# Patient Record
Sex: FEMALE | Race: BLACK OR AFRICAN AMERICAN | NOT HISPANIC OR LATINO | Employment: FULL TIME | URBAN - METROPOLITAN AREA
[De-identification: names, ages, dates, MRNs, and addresses within clinical notes are randomized per-mention and may not be internally consistent; named-entity substitution may affect disease eponyms.]

---

## 2019-08-22 ENCOUNTER — DOCUMENTATION ONLY (OUTPATIENT)
Dept: NEUROLOGY | Facility: CLINIC | Age: 44
End: 2019-08-22

## 2019-08-22 NOTE — PROGRESS NOTES
Spoke with pt; informed her that we received the neurology notes confirming MS diagnosis; advised pt that we will need most recent MRI report to schedule appointment; pt verbalized understanding, stated that she has a MRI scheduled Wednesday and will have results faxed to this office; pt read fax number back to confirm accuracy;

## 2019-09-05 ENCOUNTER — TELEPHONE (OUTPATIENT)
Dept: NEUROLOGY | Facility: CLINIC | Age: 44
End: 2019-09-05

## 2019-09-05 NOTE — TELEPHONE ENCOUNTER
----- Message from Joseph Kaye sent at 9/5/2019 10:10 AM CDT -----  Contact: pt @ [phone]  Pt is calling to confirm if MRI has been received

## 2019-09-05 NOTE — TELEPHONE ENCOUNTER
----- Message from Zulay Weaver sent at 9/5/2019 11:26 AM CDT -----  Contact: Pt.Self   Patient Returning Call from [facility]     Who Left Message for Patient:   Ms.Shanna GOMEZ Moeller, RN       Communication Preference:   [phone]     Additional Information:     Thank You

## 2019-09-16 ENCOUNTER — TELEPHONE (OUTPATIENT)
Dept: NEUROLOGY | Facility: CLINIC | Age: 44
End: 2019-09-16

## 2019-10-16 ENCOUNTER — LAB VISIT (OUTPATIENT)
Dept: LAB | Facility: HOSPITAL | Age: 44
End: 2019-10-16
Attending: PSYCHIATRY & NEUROLOGY
Payer: COMMERCIAL

## 2019-10-16 ENCOUNTER — OFFICE VISIT (OUTPATIENT)
Dept: NEUROLOGY | Facility: CLINIC | Age: 44
End: 2019-10-16
Payer: COMMERCIAL

## 2019-10-16 VITALS
HEIGHT: 64 IN | DIASTOLIC BLOOD PRESSURE: 88 MMHG | BODY MASS INDEX: 38.58 KG/M2 | WEIGHT: 226 LBS | HEART RATE: 100 BPM | SYSTOLIC BLOOD PRESSURE: 127 MMHG

## 2019-10-16 DIAGNOSIS — G35 MS (MULTIPLE SCLEROSIS): ICD-10-CM

## 2019-10-16 DIAGNOSIS — D84.9 IMMUNOSUPPRESSION: ICD-10-CM

## 2019-10-16 DIAGNOSIS — G35 MS (MULTIPLE SCLEROSIS): Primary | ICD-10-CM

## 2019-10-16 LAB
25(OH)D3+25(OH)D2 SERPL-MCNC: 37 NG/ML (ref 30–96)
ALBUMIN SERPL BCP-MCNC: 3.9 G/DL (ref 3.5–5.2)
ALP SERPL-CCNC: 57 U/L (ref 55–135)
ALT SERPL W/O P-5'-P-CCNC: 12 U/L (ref 10–44)
ANION GAP SERPL CALC-SCNC: 11 MMOL/L (ref 8–16)
AST SERPL-CCNC: 15 U/L (ref 10–40)
BASOPHILS # BLD AUTO: 0.06 K/UL (ref 0–0.2)
BASOPHILS NFR BLD: 0.5 % (ref 0–1.9)
BILIRUB SERPL-MCNC: 0.3 MG/DL (ref 0.1–1)
BUN SERPL-MCNC: 8 MG/DL (ref 6–20)
CALCIUM SERPL-MCNC: 9.4 MG/DL (ref 8.7–10.5)
CHLORIDE SERPL-SCNC: 107 MMOL/L (ref 95–110)
CO2 SERPL-SCNC: 24 MMOL/L (ref 23–29)
CREAT SERPL-MCNC: 0.6 MG/DL (ref 0.5–1.4)
DIFFERENTIAL METHOD: ABNORMAL
EOSINOPHIL # BLD AUTO: 0.1 K/UL (ref 0–0.5)
EOSINOPHIL NFR BLD: 0.6 % (ref 0–8)
ERYTHROCYTE [DISTWIDTH] IN BLOOD BY AUTOMATED COUNT: 17.7 % (ref 11.5–14.5)
EST. GFR  (AFRICAN AMERICAN): >60 ML/MIN/1.73 M^2
EST. GFR  (NON AFRICAN AMERICAN): >60 ML/MIN/1.73 M^2
GLUCOSE SERPL-MCNC: 103 MG/DL (ref 70–110)
HBV CORE AB SERPL QL IA: NEGATIVE
HBV SURFACE AB SER-ACNC: POSITIVE M[IU]/ML
HBV SURFACE AG SERPL QL IA: NEGATIVE
HCT VFR BLD AUTO: 36.8 % (ref 37–48.5)
HCV AB SERPL QL IA: NEGATIVE
HEPATITIS A ANTIBODY, IGG: NEGATIVE
HGB BLD-MCNC: 11.8 G/DL (ref 12–16)
HIV 1+2 AB+HIV1 P24 AG SERPL QL IA: NEGATIVE
IMM GRANULOCYTES # BLD AUTO: 0.05 K/UL (ref 0–0.04)
IMM GRANULOCYTES NFR BLD AUTO: 0.4 % (ref 0–0.5)
LYMPHOCYTES # BLD AUTO: 1.9 K/UL (ref 1–4.8)
LYMPHOCYTES NFR BLD: 15.4 % (ref 18–48)
MCH RBC QN AUTO: 23.2 PG (ref 27–31)
MCHC RBC AUTO-ENTMCNC: 32.1 G/DL (ref 32–36)
MCV RBC AUTO: 72 FL (ref 82–98)
MONOCYTES # BLD AUTO: 0.6 K/UL (ref 0.3–1)
MONOCYTES NFR BLD: 4.9 % (ref 4–15)
NEUTROPHILS # BLD AUTO: 9.5 K/UL (ref 1.8–7.7)
NEUTROPHILS NFR BLD: 78.2 % (ref 38–73)
NRBC BLD-RTO: 0 /100 WBC
PLATELET # BLD AUTO: 312 K/UL (ref 150–350)
PMV BLD AUTO: 10.2 FL (ref 9.2–12.9)
POTASSIUM SERPL-SCNC: 3.5 MMOL/L (ref 3.5–5.1)
PROT SERPL-MCNC: 7.4 G/DL (ref 6–8.4)
RBC # BLD AUTO: 5.08 M/UL (ref 4–5.4)
SODIUM SERPL-SCNC: 142 MMOL/L (ref 136–145)
VIT B12 SERPL-MCNC: 999 PG/ML (ref 210–950)
WBC # BLD AUTO: 12.19 K/UL (ref 3.9–12.7)

## 2019-10-16 PROCEDURE — 86255 FLUORESCENT ANTIBODY SCREEN: CPT

## 2019-10-16 PROCEDURE — 86706 HEP B SURFACE ANTIBODY: CPT

## 2019-10-16 PROCEDURE — 99205 OFFICE O/P NEW HI 60 MIN: CPT | Mod: S$GLB,,, | Performed by: PSYCHIATRY & NEUROLOGY

## 2019-10-16 PROCEDURE — 86682 HELMINTH ANTIBODY: CPT

## 2019-10-16 PROCEDURE — 86235 NUCLEAR ANTIGEN ANTIBODY: CPT | Mod: 59

## 2019-10-16 PROCEDURE — 85025 COMPLETE CBC W/AUTO DIFF WBC: CPT

## 2019-10-16 PROCEDURE — 86359 T CELLS TOTAL COUNT: CPT

## 2019-10-16 PROCEDURE — 86147 CARDIOLIPIN ANTIBODY EA IG: CPT

## 2019-10-16 PROCEDURE — 86360 T CELL ABSOLUTE COUNT/RATIO: CPT

## 2019-10-16 PROCEDURE — 87340 HEPATITIS B SURFACE AG IA: CPT

## 2019-10-16 PROCEDURE — 86255 FLUORESCENT ANTIBODY SCREEN: CPT | Mod: 59

## 2019-10-16 PROCEDURE — 86038 ANTINUCLEAR ANTIBODIES: CPT

## 2019-10-16 PROCEDURE — 99999 PR PBB SHADOW E&M-EST. PATIENT-LVL III: CPT | Mod: PBBFAC,,, | Performed by: PSYCHIATRY & NEUROLOGY

## 2019-10-16 PROCEDURE — 86618 LYME DISEASE ANTIBODY: CPT

## 2019-10-16 PROCEDURE — 99354 PR PROLONGED SVC, OUPT, 1ST HR: CPT | Mod: S$GLB,,, | Performed by: PSYCHIATRY & NEUROLOGY

## 2019-10-16 PROCEDURE — 80053 COMPREHEN METABOLIC PANEL: CPT

## 2019-10-16 PROCEDURE — 99354 PR PROLONGED SVC, OUPT, 1ST HR: ICD-10-PCS | Mod: S$GLB,,, | Performed by: PSYCHIATRY & NEUROLOGY

## 2019-10-16 PROCEDURE — 82164 ANGIOTENSIN I ENZYME TEST: CPT

## 2019-10-16 PROCEDURE — 86235 NUCLEAR ANTIGEN ANTIBODY: CPT

## 2019-10-16 PROCEDURE — 82306 VITAMIN D 25 HYDROXY: CPT

## 2019-10-16 PROCEDURE — 86592 SYPHILIS TEST NON-TREP QUAL: CPT

## 2019-10-16 PROCEDURE — 86704 HEP B CORE ANTIBODY TOTAL: CPT

## 2019-10-16 PROCEDURE — 82607 VITAMIN B-12: CPT

## 2019-10-16 PROCEDURE — 86787 VARICELLA-ZOSTER ANTIBODY: CPT

## 2019-10-16 PROCEDURE — 86703 HIV-1/HIV-2 1 RESULT ANTBDY: CPT

## 2019-10-16 PROCEDURE — 99205 PR OFFICE/OUTPT VISIT, NEW, LEVL V, 60-74 MIN: ICD-10-PCS | Mod: S$GLB,,, | Performed by: PSYCHIATRY & NEUROLOGY

## 2019-10-16 PROCEDURE — 99999 PR PBB SHADOW E&M-EST. PATIENT-LVL III: ICD-10-PCS | Mod: PBBFAC,,, | Performed by: PSYCHIATRY & NEUROLOGY

## 2019-10-16 PROCEDURE — 86225 DNA ANTIBODY NATIVE: CPT

## 2019-10-16 PROCEDURE — 86790 VIRUS ANTIBODY NOS: CPT

## 2019-10-16 PROCEDURE — 86803 HEPATITIS C AB TEST: CPT

## 2019-10-16 RX ORDER — TOLTERODINE 2 MG/1
CAPSULE, EXTENDED RELEASE ORAL
Refills: 3 | COMMUNITY
Start: 2019-08-21 | End: 2021-04-21 | Stop reason: SDUPTHER

## 2019-10-16 RX ORDER — ACETAMINOPHEN 500 MG
2 TABLET ORAL
COMMUNITY

## 2019-10-16 RX ORDER — DIMETHYL FUMARATE 240 MG/1
CAPSULE ORAL
COMMUNITY
Start: 2019-09-03 | End: 2021-01-13

## 2019-10-16 RX ORDER — AMITRIPTYLINE HYDROCHLORIDE 10 MG/1
10 TABLET, FILM COATED ORAL NIGHTLY
Refills: 2 | COMMUNITY
Start: 2019-08-21 | End: 2021-01-15

## 2019-10-16 RX ORDER — NORETHINDRONE ACETATE AND ETHINYL ESTRADIOL AND FERROUS FUMARATE 1MG-20(24)
KIT ORAL
COMMUNITY
Start: 2019-09-19 | End: 2022-08-15

## 2019-10-16 NOTE — LETTER
October 20, 2019      Rafael Arias MD  [address]  [address]  [facility] [address]           [facility]- Multiple Sclerosis  [address]  [facility] [address]  Phone: [phone]          Patient: Vik May   MR Number: [id number]   YOB: 1975   Date of Visit: 10/16/2019       Dear Dr. Rafael Arias:    Thank you for referring Vik May to me for evaluation. Attached you will find relevant portions of my assessment and plan of care.    If you have questions, please do not hesitate to call me. I look forward to following Vik May along with you.    Sincerely,    Nayely Maya MD    Enclosure  CC:  No Recipients    If you would like to receive this communication electronically, please contact [email or web address] or [phone] to request more information on [employer] Link access.    For providers and/or their staff who would like to refer a patient to [facility], please contact us through our one-stop-shop provider referral line, [facility] , at [phone].    If you feel you have received this communication in error or would no longer like to receive these types of communications, please e-mail [email or web address]

## 2019-10-16 NOTE — PROGRESS NOTES
"Neurology Consultation         History of present illness:   Ms Vik May is a 45 y/o woman referred by Dr. Arias for the treatment of MS.      She explains that she was diagnosed with MS about 7 year ago.  Initial symptoms were loss of vision in both eyes.  She was sent to ER for an MRI which showed "something" abnormal.  Was referred to a neurologist in [place] who confirmed of MS based on MRI (pt cannot remember this neurologist's name).  She was referred for a spinal tap which has a positive MS profile according to the patient.  Pt feels this work-up was completed at the [facility].     She was told that she had MS.  She was treated with 5 day course of steroids IV as outpatient, and she improved;   She was started on Rebif at the time of diagnosis; she was not successful b/c she has needler phobia, so only took the medicine once or twice per week.  She did have flu-like symptoms as well;     Within the first year after diagnosis, she established care with Dr. Arias in [place]. She developed a second episode of visual loss in both eyes.   She was treated with steroids again IV for several days and this helped as well;    He switched her to Tecfidera which she tolerated.   She then did well for years, until 2 year ago during a period of time with insurance issues when she did not have Tecfidera;  she developed again blurry vision in both eyes and dragging of the right leg; was treated with IV steroids again and improved and walked normally; In January of this year, however, she started dragging the right leg again, and this has not improved;  She is falling a lot;  For this reason she is referred to me;       She takes vitamin D3 2,000 Iu/day.     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    No family history on file.    Social History     Socioeconomic History    Marital status:      Spouse name: Not on file    Number of children: 3    Years of education: Not on file    Highest education level: Not on file   Occupational History    Not on file   Social Needs    Financial resource strain: Not on file    Food insecurity:     Worry: Not on file     Inability: Not on file    Transportation needs:     Medical: Not on file     Non-medical: Not on file   Tobacco Use    Smoking status: Current Every Day Smoker     Packs/day: 0.20     Years: 9.00     Pack years: 1.80     Types: Cigarettes    Smokeless tobacco: Never Used   Substance and Sexual Activity    Alcohol use: Yes     Frequency: Monthly or less    Drug use: Not Currently    Sexual activity: Yes     Birth control/protection: Pill   Lifestyle    Physical activity:     Days per week: Not on file     Minutes per session: Not on file    Stress: Not on file   Relationships    Social connections:     Talks on phone: Not on file     Gets together: Not on file     Attends [employer] service: Not on file     Active member of club or organization: Not on file     Attends meetings of clubs or organizations: Not on file     Relationship status: Not on file   Other Topics Concern    Not on file   Social History Narrative    Not on file     MEDS: reviewed  Review of patient's allergies indicates:  No Known Allergies      Physical Exam    Vitals:    10/16/19 1048   BP: 127/88   Pulse: 100   Weight: 102.5 kg (225 lb 15.5 oz)   Height: 5' 4" (1.626 m)       In general, the patient is well nourished.    25 foot timed walk 13.79s without assist; favors right leg;     No bruits. She has significant disc pallor bilaterally;     MENTAL STATUS: language is fluent, normal verbal comprehension, short-term and remote memory is somewhat impaired based on history giving, attention is normal, patient is alert and oriented x 3, " fund of knowlege is appropriate by vocabulary.     CRANIAL NERVE EXAM:  Color vision:  1/18 plates OS, and 1/18 plates OD; corrected VA 20/25 OS, and 20/30 OS;   There is no BELIA.  Extraocular muscles are intact. Pupils are equal, round, and reactive to light. No facial asymmetry. Facial sensation is intact bilaterally. There is no dysarthria. Uvula is midline, and palate moves symmetrically. Shoulder shrug intact bilaterlly. Tongue protrusion is midline. Hearing is grossly intact. Neck is supple.     MOTOR EXAM: slow RSM bilaterally;  4/5 IO right hand, o/w 5/5 in other UE groups;   Right HF 2/5, KF 4/5, DF 4/5; LLE 4+/5 in all flexor groups    REFLEXES: 3+ and symmetric throughout in all four extremeties; toes are up bilaterally; She right hoffmans    SENSORY EXAM: decreased sensation distal LE in stocking distribution, and right UE    COORDINATION: Normal finger-to-nose exam     GAIT: wide based and slow; drags right leg;         IMAGING (personally reviewed):  MRI of brain and Cspine done at  8/2019 personally reviewed;   Brain MRI shows moderate burden of disease typical of SM with DF, and CC and temporal lobe involvement; only a few BH; vandana negative;   C spine MRI with multiple short segment lesions t/o; vandana negative; typical of MS    MEDICAL RECORDS: reviewed from Dr. Arias         ASSESSMENT:        1.   Multiple Sclerosis      2.    Gait disturbance       DISCUSSION:   I agree with patient's diagnosis of MS. Her exam shows long-track signs, profound visual loss and gait disturbance, and imaging is classic for MS.  Will send a few additional lab tests to screen for conditions that mimic MS as unusual to have bilateral ON as she has had multiple times with MS.  I recommend change in DMT; will send CBC and T cell subsets today and see her back next week to make final decision on DMT;      RECOMMENDATIONS:  1. Consider new DMT           2. Check labs today--CD8, occult infections, vit D, ALC, Cr, etc;   Check vit D           3. PT-- [facility]            4. Consider Ampyra            5. Check mimics, MOG, NMO, and B12  6. Refer to Dr. Coker;   7. Will reach out to our  Doris Diallo regarding SSDI      MS (multiple sclerosis)  -     CBC auto differential; Future; Expected date: 10/16/2019  -     Comprehensive metabolic panel; Future  -     [place]-Suppressor Ratio; Future; Expected date: 10/16/2019  -     STRATIFY JCV ANTIBODY (with Index); Future; Expected date: 10/16/2019  -     Angiotensin converting enzyme; Future; Expected date: 10/16/2019  -     Anti-DNA antibody, double-stranded; Future; Expected date: 10/16/2019  -     Anti Sm/RNP Antibody; Future; Expected date: 10/16/2019  -     ISHMAEL Screen w/Reflex; Future; Expected date: 10/16/2019  -     B. burgdorferi Abs (Lyme Disease); Future; Expected date: 10/16/2019  -     Cardiolipin antibody; Future; Expected date: 10/16/2019  -     Vitamin B12; Future; Expected date: 10/16/2019  -     Sjogrens syndrome-B extractable nuclear antibody; Future; Expected date: 10/16/2019  -     Sjogrens syndrome-A extractable nuclear antibody; Future; Expected date: 10/16/2019  -     RPR; Future; Expected date: 10/16/2019  -     HIV 1/2 Ag/Ab (4th Gen); Future; Expected date: 10/16/2019  -     Vitamin D; Future  -     NMO AQUAPORIN-4-IGG, SERUM; Future; Expected date: 10/16/2019  -     MOG-IgG1; Future; Expected date: 10/16/2019  -     Hepatitis A antibody, IgG; Future; Expected date: 10/16/2019  -     Hepatitis B core antibody, total; Future; Expected date: 10/16/2019  -     Hepatitis B surface antibody; Future; Expected date: 10/16/2019  -     Hepatitis C antibody; Future; Expected date: 10/16/2019  -     Hepatitis B surface antigen; Future; Expected date: 10/16/2019  -     Quantiferon Gold TB; Future; Expected date: 10/16/2019  -     Varicella zoster antibody, IgG; Future; Expected date: 10/16/2019  -     STRONGYLOIDES IGG ANTIBODIES; Future;  Expected date: 10/16/2019  -     Ambulatory Referral to Ophthalmology  -     Ambulatory Referral to Physical/Occupational Therapy    Immunosuppression  -     CBC auto differential; Future; Expected date: 10/16/2019  -     Comprehensive metabolic panel; Future  -     [place]-Suppressor Ratio; Future; Expected date: 10/16/2019  -     STRATIFY JCV ANTIBODY (with Index); Future; Expected date: 10/16/2019  -     Angiotensin converting enzyme; Future; Expected date: 10/16/2019  -     Anti-DNA antibody, double-stranded; Future; Expected date: 10/16/2019  -     Anti Sm/RNP Antibody; Future; Expected date: 10/16/2019  -     ISHMAEL Screen w/Reflex; Future; Expected date: 10/16/2019  -     B. burgdorferi Abs (Lyme Disease); Future; Expected date: 10/16/2019  -     Cardiolipin antibody; Future; Expected date: 10/16/2019  -     Vitamin B12; Future; Expected date: 10/16/2019  -     Sjogrens syndrome-B extractable nuclear antibody; Future; Expected date: 10/16/2019  -     Sjogrens syndrome-A extractable nuclear antibody; Future; Expected date: 10/16/2019  -     RPR; Future; Expected date: 10/16/2019  -     HIV 1/2 Ag/Ab (4th Gen); Future; Expected date: 10/16/2019  -     Vitamin D; Future  -     NMO AQUAPORIN-4-IGG, SERUM; Future; Expected date: 10/16/2019  -     MOG-IgG1; Future; Expected date: 10/16/2019  -     Hepatitis A antibody, IgG; Future; Expected date: 10/16/2019  -     Hepatitis B core antibody, total; Future; Expected date: 10/16/2019  -     Hepatitis B surface antibody; Future; Expected date: 10/16/2019  -     Hepatitis C antibody; Future; Expected date: 10/16/2019  -     Hepatitis B surface antigen; Future; Expected date: 10/16/2019  -     Quantiferon Gold TB; Future; Expected date: 10/16/2019  -     Varicella zoster antibody, IgG; Future; Expected date: 10/16/2019  -     STRONGYLOIDES IGG ANTIBODIES; Future; Expected date: 10/16/2019          Thank you for the referral.        Our visit today lasted 90 minutes, and 100%  of this time was spent face to face with the patient. Over 50% of this visit included discussion of the treatment plan/medication changes/symptom management/exam findings/imaging results/coordination of care. The patient agrees with the plan of care.         Nayely Maya MD, MPH

## 2019-10-16 NOTE — Clinical Note
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

## 2019-10-17 ENCOUNTER — TELEPHONE (OUTPATIENT)
Dept: OPHTHALMOLOGY | Facility: CLINIC | Age: 44
End: 2019-10-17

## 2019-10-17 LAB
ABSOLUTE CD3: 1561 CELLS/UL (ref 700–2100)
ABSOLUTE CD8: 292 CELLS/UL (ref 200–900)
ACE SERPL-CCNC: 19 U/L (ref 16–85)
ANA SER QL IF: NORMAL
ANTI SM/RNP ANTIBODY: 9.06 EU (ref 0–19.99)
ANTI-SM/RNP INTERPRETATION: NEGATIVE
ANTI-SSA ANTIBODY: 2.07 EU (ref 0–19.99)
ANTI-SSA INTERPRETATION: NEGATIVE
ANTI-SSB ANTIBODY: 3.29 EU (ref 0–19.99)
ANTI-SSB INTERPRETATION: NEGATIVE
CD3%: 77.4 % (ref 55–83)
CD3+CD4+ CELLS # BLD: 1277 CELLS/UL (ref 300–1400)
CD3+CD4+ CELLS NFR BLD: 63.4 % (ref 28–57)
CD4/CD8 RATIO: 4.38 (ref 0.9–3.6)
CD8 % SUPPRESSOR T CELL: 14.5 % (ref 10–39)
DSDNA AB SER-ACNC: NORMAL [IU]/ML
RPR SER QL: NORMAL
STRONGYLOIDES ANTIBODY IGG: NEGATIVE
VARICELLA INTERPRETATION: POSITIVE
VARICELLA ZOSTER IGG: 3.31 ISR (ref 0–0.9)

## 2019-10-18 LAB
B BURGDOR AB SER IA-ACNC: 0.12 INDEX VALUE
CARDIOLIPIN IGG SER IA-ACNC: <9.4 GPL (ref 0–14.99)
CARDIOLIPIN IGM SER IA-ACNC: <9.4 MPL (ref 0–12.49)

## 2019-10-21 LAB — NMO/AQP4 FACS,S: NEGATIVE

## 2019-10-22 ENCOUNTER — TELEPHONE (OUTPATIENT)
Dept: PSYCHIATRY | Facility: CLINIC | Age: 44
End: 2019-10-22

## 2019-10-22 PROBLEM — G35 MS (MULTIPLE SCLEROSIS): Status: ACTIVE | Noted: 2019-10-22

## 2019-10-22 PROBLEM — D84.9 IMMUNOSUPPRESSION: Status: ACTIVE | Noted: 2019-10-22

## 2019-10-22 NOTE — TELEPHONE ENCOUNTER
SIL received referral from TSERING Maya MD to contact pt regarding SSDI process.  MD reports pt has significant disability of gait and vision and is still working full-time.  SIL phoned pt and left voicemail for her to call.

## 2019-10-23 ENCOUNTER — OFFICE VISIT (OUTPATIENT)
Dept: NEUROLOGY | Facility: CLINIC | Age: 44
End: 2019-10-23
Payer: COMMERCIAL

## 2019-10-23 ENCOUNTER — TELEPHONE (OUTPATIENT)
Dept: NEUROLOGY | Facility: CLINIC | Age: 44
End: 2019-10-23

## 2019-10-23 ENCOUNTER — OFFICE VISIT (OUTPATIENT)
Dept: OPHTHALMOLOGY | Facility: CLINIC | Age: 44
End: 2019-10-23
Payer: COMMERCIAL

## 2019-10-23 VITALS
HEART RATE: 83 BPM | BODY MASS INDEX: 38.76 KG/M2 | WEIGHT: 227.06 LBS | HEIGHT: 64 IN | DIASTOLIC BLOOD PRESSURE: 87 MMHG | SYSTOLIC BLOOD PRESSURE: 138 MMHG

## 2019-10-23 DIAGNOSIS — G35 MS (MULTIPLE SCLEROSIS): ICD-10-CM

## 2019-10-23 DIAGNOSIS — H47.293 PARTIAL OPTIC ATROPHY OF BOTH EYES: ICD-10-CM

## 2019-10-23 DIAGNOSIS — Z71.89 COUNSELING REGARDING GOALS OF CARE: ICD-10-CM

## 2019-10-23 DIAGNOSIS — Z79.899 HIGH RISK MEDICATION USE: ICD-10-CM

## 2019-10-23 DIAGNOSIS — R53.83 FATIGUE, UNSPECIFIED TYPE: ICD-10-CM

## 2019-10-23 DIAGNOSIS — Z29.89 PROPHYLACTIC IMMUNOTHERAPY: ICD-10-CM

## 2019-10-23 DIAGNOSIS — G35 MULTIPLE SCLEROSIS: Primary | ICD-10-CM

## 2019-10-23 DIAGNOSIS — R26.9 GAIT DISTURBANCE: ICD-10-CM

## 2019-10-23 DIAGNOSIS — G35 MS (MULTIPLE SCLEROSIS): Primary | ICD-10-CM

## 2019-10-23 LAB — MOG-IGG1: NEGATIVE

## 2019-10-23 PROCEDURE — 99215 PR OFFICE/OUTPT VISIT, EST, LEVL V, 40-54 MIN: ICD-10-PCS | Mod: S$GLB,,, | Performed by: CLINICAL NURSE SPECIALIST

## 2019-10-23 PROCEDURE — 92133 CPTRZD OPH DX IMG PST SGM ON: CPT | Mod: S$GLB,,, | Performed by: OPHTHALMOLOGY

## 2019-10-23 PROCEDURE — 99215 OFFICE O/P EST HI 40 MIN: CPT | Mod: S$GLB,,, | Performed by: CLINICAL NURSE SPECIALIST

## 2019-10-23 PROCEDURE — 92015 DETERMINE REFRACTIVE STATE: CPT | Mod: S$GLB,,, | Performed by: OPHTHALMOLOGY

## 2019-10-23 PROCEDURE — 99999 PR PBB SHADOW E&M-EST. PATIENT-LVL III: CPT | Mod: PBBFAC,,, | Performed by: OPHTHALMOLOGY

## 2019-10-23 PROCEDURE — 99999 PR PBB SHADOW E&M-EST. PATIENT-LVL III: CPT | Mod: PBBFAC,,, | Performed by: CLINICAL NURSE SPECIALIST

## 2019-10-23 PROCEDURE — 92133 POSTERIOR SEGMENT OCT OPTIC NERVE(OCULAR COHERENCE TOMOGRAPHY) - OU - BOTH EYES: ICD-10-PCS | Mod: S$GLB,,, | Performed by: OPHTHALMOLOGY

## 2019-10-23 PROCEDURE — 92004 COMPRE OPH EXAM NEW PT 1/>: CPT | Mod: S$GLB,,, | Performed by: OPHTHALMOLOGY

## 2019-10-23 PROCEDURE — 99999 PR PBB SHADOW E&M-EST. PATIENT-LVL III: ICD-10-PCS | Mod: PBBFAC,,, | Performed by: OPHTHALMOLOGY

## 2019-10-23 PROCEDURE — 99999 PR PBB SHADOW E&M-EST. PATIENT-LVL III: ICD-10-PCS | Mod: PBBFAC,,, | Performed by: CLINICAL NURSE SPECIALIST

## 2019-10-23 PROCEDURE — 92015 PR REFRACTION: ICD-10-PCS | Mod: S$GLB,,, | Performed by: OPHTHALMOLOGY

## 2019-10-23 PROCEDURE — 92004 PR EYE EXAM, NEW PATIENT,COMPREHESV: ICD-10-PCS | Mod: S$GLB,,, | Performed by: OPHTHALMOLOGY

## 2019-10-23 RX ORDER — AMANTADINE HYDROCHLORIDE 100 MG/1
TABLET ORAL
Qty: 60 TABLET | Refills: 5 | Status: SHIPPED | OUTPATIENT
Start: 2019-10-23 | End: 2019-12-05 | Stop reason: SDUPTHER

## 2019-10-23 RX ORDER — DALFAMPRIDINE 10 MG/1
1 TABLET, FILM COATED, EXTENDED RELEASE ORAL EVERY 12 HOURS
Qty: 60 TABLET | Refills: 3 | Status: SHIPPED | OUTPATIENT
Start: 2019-10-23 | End: 2019-11-05 | Stop reason: SDUPTHER

## 2019-10-23 NOTE — PROGRESS NOTES
Subjective:       Patient ID: Vik May is a 44 y.o. female who presents today for a routine clinic visit for MS.  She was seen as a new patient by Dr. Maya last week. The history has been provided by the patient.     MS HPI:  · DMT: Tecfidera 240mg twice daily.   · Side effects from DMT? No  · Taking vitamin D3 as recommended? Yes -  Dose: 2000 units daily   She will be seeing Dr. Coker today.  She has not heard from [facility] PT yet.   She reports that her fatigue is significant and impairs her function at times. Gait disturbance is her other most bothersome symptom.     SOCIAL HISTORY  Social History     Tobacco Use    Smoking status: Current Every Day Smoker     Packs/day: 0.20     Years: 9.00     Pack years: 1.80     Types: Cigarettes    Smokeless tobacco: Never Used   Substance Use Topics    Alcohol use: Yes     Frequency: Monthly or less    Drug use: Not Currently     Living arrangements - the patient lives with her family; she is  and has 3 children  Employment: works full-time in security        Objective:        1. 25 foot timed walk: was 13.79 seconds at last visit without assist   Timed 25 Foot Walk: 10/16/2019   Did patient wear an AFO? No   Was assistive device used? No   Time for 25 Foot Walk (seconds) 13.79       Neurologic Exam   Deferred today    Imaging:     No new imaging to review today.     Labs:     Lab Results   Component Value Date    ZAFBCXKU70MS 37 10/16/2019     Lab Results   Component Value Date    PU2KUYLJ 77.4 10/16/2019    ABSOLUTECD3 1561 10/16/2019    NN1CYPCV 14.5 10/16/2019    ABSOLUTECD8 292 10/16/2019    QQ0XYYNU 63.4 (H) 10/16/2019    ABSOLUTECD4 1277 10/16/2019    LABCD48 4.38 (H) 10/16/2019     Lab Results   Component Value Date    WBC 12.19 10/16/2019    RBC 5.08 10/16/2019    HGB 11.8 (L) 10/16/2019    HCT 36.8 (L) 10/16/2019    MCV 72 (L) 10/16/2019    MCH 23.2 (L) 10/16/2019    MCHC 32.1 10/16/2019    RDW 17.7 (H) 10/16/2019     10/16/2019     MPV 10.2 10/16/2019    GRAN 9.5 (H) 10/16/2019    GRAN 78.2 (H) 10/16/2019    LYMPH 1.9 10/16/2019    LYMPH 15.4 (L) 10/16/2019    MONO 0.6 10/16/2019    MONO 4.9 10/16/2019    EOS 0.1 10/16/2019    BASO 0.06 10/16/2019    EOSINOPHIL 0.6 10/16/2019    BASOPHIL 0.5 10/16/2019     Sodium   Date Value Ref Range Status   10/16/2019 142 136 - 145 mmol/L Final     Potassium   Date Value Ref Range Status   10/16/2019 3.5 3.5 - 5.1 mmol/L Final     Chloride   Date Value Ref Range Status   10/16/2019 107 95 - 110 mmol/L Final     CO2   Date Value Ref Range Status   10/16/2019 24 23 - 29 mmol/L Final     Glucose   Date Value Ref Range Status   10/16/2019 103 70 - 110 mg/dL Final     BUN, Bld   Date Value Ref Range Status   10/16/2019 8 6 - 20 mg/dL Final     Creatinine   Date Value Ref Range Status   10/16/2019 0.6 0.5 - 1.4 mg/dL Final     Calcium   Date Value Ref Range Status   10/16/2019 9.4 8.7 - 10.5 mg/dL Final     Total Protein   Date Value Ref Range Status   10/16/2019 7.4 6.0 - 8.4 g/dL Final     Albumin   Date Value Ref Range Status   10/16/2019 3.9 3.5 - 5.2 g/dL Final     Total Bilirubin   Date Value Ref Range Status   10/16/2019 0.3 0.1 - 1.0 mg/dL Final     Comment:     For infants and newborns, interpretation of results should be based  on gestational age, weight and in agreement with clinical  observations.  Premature Infant recommended reference ranges:  Up to 24 hours.............<8.0 mg/dL  Up to 48 hours............<12.0 mg/dL  3-5 days..................<15.0 mg/dL  6-29 days.................<15.0 mg/dL       Alkaline Phosphatase   Date Value Ref Range Status   10/16/2019 57 55 - 135 U/L Final     AST   Date Value Ref Range Status   10/16/2019 15 10 - 40 U/L Final     ALT   Date Value Ref Range Status   10/16/2019 12 10 - 44 U/L Final     Anion Gap   Date Value Ref Range Status   10/16/2019 11 8 - 16 mmol/L Final     eGFR if    Date Value Ref Range Status   10/16/2019 >60.0 >60  mL/min/1.73 m^2 Final     eGFR if non    Date Value Ref Range Status   10/16/2019 >60.0 >60 mL/min/1.73 m^2 Final     Comment:     Calculation used to obtain the estimated glomerular filtration  rate (eGFR) is the CKD-EPI equation.          TB Gold negative   Strongyloides negative   VZV positive (past infection or vaccination)  Hep B Surface Ag negative  Hep B Surface Ab positive (past vaccination)  Hep B Core Ab negative   Hep C Ab negative   Hep A IgG negative     MOG in process   JCV in process   NMO negative   HIV negative   RPR non-reactive   SSA/SSB negative   C43=583  Cardiolipin Ab negative   Lyme Ab negative   ISHMAEL negative   Anti Sm/RNP Ab negative   Ds DNA Ab negative  ACE=19 (normal)     Absolute LE8=972  AST/ALT=15/12 (normal)  Creatinine Bxqilikxp=561jp/min    ANC=9.5 (infection?? Or recent steroids? )  XQR=4014  Diagnosis/Assessment/Plan:    1. Multiple Sclerosis  · Assessment: Vik has fatigue and a gait disturbance, which are her two most significant and bothersome MS symptoms. We have addressed these today. See below in symptom assessment.   · Imaging: MRIs 6 months post Ocrevus start.   · Disease Modifying Therapies: Continue Vitamin D. Encouraged her to increase dose to 10,000 units daily for now. We discussed Ocrevus at length today, as well as some of the other DMTs. However, I think given her sense of progression and her level of disability, Ocrevus is the best choice for her. She has completed all pre-immunosuppression labs. I have referred her to ID for vaccine consult, but she may choose to forgo some of the recommended vaccines. The patient was counseled about the risks associated with immune suppressive therapy, including a higher risk of serious infections (UTI, URI, PML) and malignancy (breast), as well as the importance of avoiding all live virus vaccines while on immune suppressive medication. We discussed side effects of infusion reactions (itching, rash, swelling or  itchiness in the throat) and administration of pre-meds to lessen this response. We also discussed other possible risks of side effects of an allergic reaction, constipation/diarrhea, nausea/vomiting, local damage at the IV site, sores on the feet, blood in the urine, and peripheral edema. The patient verbalized understanding of risk and signed consent form. She would like to discuss Ocrevus more with her  and do her own research. She will let me know how she wants to proceed. She understands that she will need to do lab work for CBC at month 4 and CBC/CD20 at month 5. Her recent CBC showed elevated ANC. I would like to repeat this in 2 weeks. For now, she will continue Tecfidera until Ocrevus is approved.  We will advise her of washout period from [facility] once Ocrevus is approved and ready to be scheduled.     2. MS Symptom Assessment / Management  · Fatigue: Discussed amantadine 100mg twice daily. Rx sent to pharmacy. Side effects discussed, and literature provided to patient.   · Visual Symptoms: She has appt with Dr. Coker later today.   · Gait Disturbance: Will follow up on PT referral to [facility]. She denies seizure history, and she has normal creatinine clearance. Will start Ampyra. Rx sent to [facility]. Literature provided to patient. Will check creatinine clearance again in 3 months, then every 6 months after that.  · Adaptive needs: Will follow up with LCSW about SSDI      Our visit today lasted 60 minutes, and 100% of this time was spent face to face with the patient. Over 50% of this visit included discussion of the treatment plan/medication changes/symptom management/coordination of care. The patient agrees with the plan of care. I will see her back in 8 weeks, and she will follow up with Dr. Maya in February.     Daisy Moura, [facility] HospitalNS-BC, MSCN      Problem List Items Addressed This Visit     None      Visit Diagnoses     Multiple sclerosis    -  Primary    Relevant Medications     amantadine HCl 100 mg Tab    dalfampridine 10 mg Tb12    Other Relevant Orders    Ambulatory Referral to Infectious Disease    CBC auto differential    Fatigue, unspecified type        Relevant Medications    amantadine HCl 100 mg Tab    Gait disturbance        Relevant Medications    dalfampridine 10 mg Tb12

## 2019-10-23 NOTE — PATIENT INSTRUCTIONS
Amantadine capsules or tablets  What is this medicine?  AMANTADINE (a MAN gerardo mckeon) is an antiviral medicine. It is used to prevent and to treat a specific type of flu called influenza A. It will not work for colds, other types of flu, or other viral infections. This medicine is also used to treat Parkinson's disease and other movement disorders.  How should I use this medicine?  Take this medicine by mouth with a full glass of water. Follow the directions on the prescription label. Take your medicine at regular intervals. Do not take your medicine more often than directed. Take all of your medicine as directed even if you think your are better. Do not skip doses or stop your medicine early. Contact your pediatrician or health care professional regarding the useof this medicine in children. While this drug may be prescribed for children as young as 1 year old for selected conditions, precautions do apply. Patients over 65 years old may have a stronger reaction and need a smaller dose.  What side effects may I notice from receiving this medicine?  Side effects that you should report to your doctor or health care professional as soon as possible:  · allergic reactions like skin rash, itching or hives, swelling of the face, lips, or tongue  · breathing problems  · changes in vision  · depression, mood changes  · difficulty passing urine  · feeling faint or lightheaded  · fever  · hallucinations  · irregular, fast heartbeat  · mouth sores  · seizures  · swelling of the hands or feet  · unusual stiffness, tremors  Side effects that usually do not require medical attention (report to your doctor or health care professional if they continue or are bothersome):  · anxiety, irritable, nervous  · constipation or diarrhea  · loss of appetite  · nausea  · trouble sleeping  · unusually tired  What may interact with this medicine?  · alcohol  · bupropion  · quinidine  · quinine  · some diuretics  · some flu vaccines  · some  medicines for cold or allergies  · stimulants  · sulfamethoxazole; trimethoprim  · thioridazine  What if I miss a dose?  If you miss a dose, take it as soon as you can. If it is almost time for your next dose, take only that dose. Do not take double or extra doses.  Where should I keep my medicine?  Keep out of the reach of children.  Store at room temperature between 20 and 25 degrees C (68 and 77 degrees F). Keep container tightly closed. Throw away any unused medicine after the expiration date.  What should I tell my health care provider before I take this medicine?  They need to know if you have any of these conditions:  · glaucoma  · depression or other mental illness  · eczema  · heart failure or circulation problems  · kidney disease  · liver disease  · seizures  · an unusual or allergic reaction to amantadine, rimantadine, other medicines, foods, dyes, or preservatives  · pregnant or trying to get pregnant  · breast-feeding  What should I watch for while using this medicine?  Tell your doctor or health care professional if your symptoms do not improve.  You may get drowsy or dizzy. Do not drive, use machinery, or do anything that needs mental alertness until you know how this medicine affects you. Do not stand or sit up quickly, especially if you are an older patient. This reduces the risk of dizzy or fainting spells. Alcohol may interfere with the effect of this medicine. Avoid alcoholic drinks.  If you are taking this medicine for Parkinson's disease or a movement disorder, be careful. Slowly increase your daily activities as your condition improves. Do not suddenly stop taking your medicine because you may develop a severe reaction.  You may get dry mouth or eyes, or blurry vision while taking this medicine. Try sugarless gum or hard candy, and drink 6 to 8 glasses of water daily. Brush and floss your teeth regularly and carefully to avoid teeth and gum problems. You may want to wet your eyes with  lubricating eye drops. Talk to your doctor if these symptoms become a problem.  There have been reports of increased sexual urges or other strong urges such as gambling while taking some medicines for Parkinson's disease. If you experience any of these urges while taking this medicine, you should report it to your health care provider as soon as possible.  You should check your skin often for changes to moles and new growths while taking this medicine. Call your doctor if you notice any of these changes.  NOTE:This sheet is a summary. It may not cover all possible information. If you have questions about this medicine, talk to your doctor, pharmacist, or health care provider. Copyright© 2017 [employer]      Vitamin D3 10,000 units daily (Nature Made brand)    Ocrevus for MS Support and Information facebook group     Message Daisy and let her know if ready to proceed with Ocrevus

## 2019-10-23 NOTE — Clinical Note
Discussed Ocrevus at length. She signed paperwork, but wants to think about it some more. I also referred to ID. Let's discuss washout from Maria Eug[facility] during our meeting.

## 2019-10-23 NOTE — LETTER
[facility]  [address]  [facility] [address]  Phone: [phone]  Fax: [phone]   October 23, 2019    Nayely Maya MD  [address]  [facility] [address]    Patient: Vik May   MR Number: [id number]   YOB: 1975   Date of Visit: 10/23/2019       Dear Dr. Maya:    Thank you for referring Vik May to me for evaluation. Here is my assessment and plan of care:    Assessment:  /Plan     For exam results, see Encounter Report.    MS (multiple sclerosis)    Partial optic atrophy of both eyes  -     Posterior Segment OCT Optic Nerve- Both eyes      Ms. May has optic disc changes consistent with her history of multiple bouts of optic neuritis in each eye. She still has good visual acuity with eyeglasses. I will repeat her exam with visual field testing and OCT in one year or sooner if requested.          Plan:  For exam results, see Encounter Report.    MS (multiple sclerosis)    Partial optic atrophy of both eyes  -     Posterior Segment OCT Optic Nerve- Both eyes      Ms. May has optic disc changes consistent with her history of multiple bouts of optic neuritis in each eye. She still has good visual acuity with eyeglasses. I will repeat her exam with visual field testing and OCT in one year or sooner if requested.            Below you will find my full exam findings. If you have questions, please do not hesitate to call me. I look forward to following Ms. Vik May along with you.    Sincerely,          Jl Coker MD       CC  No Recipients             Base Eye Exam     Visual Acuity (Snellen - Linear)       Right Left    Dist cc 20/40 20/60 -1+1    Dist ph cc NI 20/50 -2+1    Correction:  Glasses          Tonometry (Applanation, 1:41 PM)       Right Left    Pressure 18 18          Pupils       Dark Light Shape React APD    Right 4 2 Round Brisk None    Left 4 2 Round Brisk None          Visual Fields       Right Left     Full Full           Extraocular Movement       Right Left     Full, Ortho Full, Ortho          Neuro/Psych     Oriented x3:  Yes    Mood/Affect:  Normal            Slit Lamp and Fundus Exam     External Exam       Right Left    External Normal Normal          Slit Lamp Exam       Right Left    Lids/Lashes Normal Normal    Conjunctiva/Sclera White and quiet White and quiet    Cornea Clear Clear    Anterior Chamber Deep and quiet Deep and quiet    Iris Round and reactive Round and reactive    Lens Clear Clear    Vitreous Normal Normal          Fundus Exam       Right Left    Disc 3+ Pallor temporally 3+ Pallor temporally    C/D Ratio 0.3 0.3    Macula Normal Normal    Vessels Normal Normal    Periphery Normal Normal            Refraction     Manifest Refraction       Sphere Cylinder Arch Cape Dist VA Add Near VA    Right -0.25 +0.75 005 20/25 +1 +1.25 J1+    Left -0.25 +1.00 100 20/50 +2 +1.25

## 2019-10-23 NOTE — PROGRESS NOTES
HPI     Referred by Dr.Bridget KAYLA Maya  Dx w/MS since 2007.  Patient states OU vision seem stable, but sometimes focusing problems.  Light sensitivity mainly driving at night.  No eye pain.    I have personally interviewed the patient, reviewed the history and   examined the patient and agree with the technician's exam.    Last edited by Jl Coker MD on 10/23/2019  1:14 PM. (History)            Assessment /Plan     For exam results, see Encounter Report.    MS (multiple sclerosis)    Partial optic atrophy of both eyes  -     Posterior Segment OCT Optic Nerve- Both eyes      Ms. May has optic disc changes consistent with her history of multiple bouts of optic neuritis in each eye. She still has good visual acuity with eyeglasses. I will repeat her exam with visual field testing and OCT in one year or sooner if requested.

## 2019-10-24 ENCOUNTER — TELEPHONE (OUTPATIENT)
Dept: PHARMACY | Facility: CLINIC | Age: 44
End: 2019-10-24

## 2019-10-24 ENCOUNTER — PATIENT MESSAGE (OUTPATIENT)
Dept: NEUROLOGY | Facility: CLINIC | Age: 44
End: 2019-10-24

## 2019-10-24 ENCOUNTER — TELEPHONE (OUTPATIENT)
Dept: PSYCHIATRY | Facility: CLINIC | Age: 44
End: 2019-10-24

## 2019-10-24 ENCOUNTER — DOCUMENTATION ONLY (OUTPATIENT)
Dept: NEUROLOGY | Facility: CLINIC | Age: 44
End: 2019-10-24

## 2019-10-24 LAB
JCPYV AB SERPL QL IA: POSITIVE
JCV INDEX: 2.38

## 2019-10-24 NOTE — TELEPHONE ENCOUNTER
SW spoke with pt by phone and answered questions about SSDI.  Pt tried applying previously but was denied, most likely because she was still working full-time when she applied.  Pt is currently working full-time and states she needs the income and insurance benefits.  She is  and has 2 young children.  SW offered assistance if pt chooses to move forward with an application.

## 2019-10-24 NOTE — TELEPHONE ENCOUNTER
Informed Patient  that [facility] received prescription for Dalfampridine and prior authorization is required.  [facility] will be back in touch once insurance determination is received.

## 2019-10-28 NOTE — TELEPHONE ENCOUNTER
Patient has decided to proceed with Ocrevus. She is scheduled for ID on 11/13.     TB Gold negative  Strongyloides negative  VZV positive (past infection or vaccination)  Hep B Surface Ag negative   Hep C Ab negative   Hep B Surface Ab positive (past vaccination)  Hep B Core Ab negative  Hep A IgG negative  HIV negative  RPR non-reactive  JCV positive   DD1=878  CMP normal   HON=1384    Once she is approved, we can advise on when to stop Tecfidera and schedule Ocrevus.

## 2019-11-04 ENCOUNTER — PATIENT MESSAGE (OUTPATIENT)
Dept: NEUROLOGY | Facility: CLINIC | Age: 44
End: 2019-11-04

## 2019-11-05 ENCOUNTER — TELEPHONE (OUTPATIENT)
Dept: NEUROLOGY | Facility: CLINIC | Age: 44
End: 2019-11-05

## 2019-11-05 ENCOUNTER — PATIENT MESSAGE (OUTPATIENT)
Dept: PHARMACY | Facility: CLINIC | Age: 44
End: 2019-11-05

## 2019-11-05 DIAGNOSIS — G35 MULTIPLE SCLEROSIS: ICD-10-CM

## 2019-11-05 DIAGNOSIS — R26.9 GAIT DISTURBANCE: ICD-10-CM

## 2019-11-05 RX ORDER — DALFAMPRIDINE 10 MG/1
1 TABLET, FILM COATED, EXTENDED RELEASE ORAL EVERY 12 HOURS
Qty: 60 TABLET | Refills: 4 | Status: SHIPPED | OUTPATIENT
Start: 2019-11-05 | End: 2019-11-06 | Stop reason: CLARIF

## 2019-11-05 NOTE — TELEPHONE ENCOUNTER
----- Message from Rowan Ruiz sent at 11/5/2019 11:49 AM CST -----  Regarding: Dalfampridine  Contact: [phone]  FYI:  Dalfampridine prior authorization has been approved through 11/4/20.  Patient's insurance requires the patient to fill through [facility].  Please send prescription to [facility], which has been added to the patient's EPIC profile.  Patient has been notified and provided with the necessary info to call and schedule a delivery.    To complete this in EPIC, the original order MUST be discontinued and re-typed as a new prescription with the updated pharmacy listed.  Clicking reorder will continue to route the rx to [facility] even if the pharmacy is changed.  Please opt the patient out of [facility] when the BPA is fired.

## 2019-11-05 NOTE — TELEPHONE ENCOUNTER
DOCUMENTATION ONLY:  Prior authorization for Dalfampridine approved from 10/6/19 to 11/4/20    Case Id: [id number]    Co-pay: Should be $0    Patient Assistance is not required.

## 2019-11-05 NOTE — TELEPHONE ENCOUNTER
Creatinine clearance range based on IBW and adjusted body weight is 105.5-142.7ml/min. Ok to fill Ampyra.

## 2019-11-06 ENCOUNTER — LAB VISIT (OUTPATIENT)
Dept: LAB | Facility: HOSPITAL | Age: 44
End: 2019-11-06
Attending: CLINICAL NURSE SPECIALIST
Payer: COMMERCIAL

## 2019-11-06 ENCOUNTER — TELEPHONE (OUTPATIENT)
Dept: NEUROLOGY | Facility: CLINIC | Age: 44
End: 2019-11-06

## 2019-11-06 DIAGNOSIS — G35 MULTIPLE SCLEROSIS: ICD-10-CM

## 2019-11-06 DIAGNOSIS — G35 MULTIPLE SCLEROSIS: Primary | ICD-10-CM

## 2019-11-06 DIAGNOSIS — R26.9 GAIT DISTURBANCE: ICD-10-CM

## 2019-11-06 LAB
BASOPHILS # BLD AUTO: 0.05 K/UL (ref 0–0.2)
BASOPHILS NFR BLD: 0.5 % (ref 0–1.9)
DIFFERENTIAL METHOD: ABNORMAL
EOSINOPHIL # BLD AUTO: 0.1 K/UL (ref 0–0.5)
EOSINOPHIL NFR BLD: 1.2 % (ref 0–8)
ERYTHROCYTE [DISTWIDTH] IN BLOOD BY AUTOMATED COUNT: 18 % (ref 11.5–14.5)
HCT VFR BLD AUTO: 39.9 % (ref 37–48.5)
HGB BLD-MCNC: 12.8 G/DL (ref 12–16)
LYMPHOCYTES # BLD AUTO: 1.8 K/UL (ref 1–4.8)
LYMPHOCYTES NFR BLD: 16.2 % (ref 18–48)
MCH RBC QN AUTO: 23 PG (ref 27–31)
MCHC RBC AUTO-ENTMCNC: 32.1 G/DL (ref 32–36)
MCV RBC AUTO: 72 FL (ref 82–98)
MONOCYTES # BLD AUTO: 0.7 K/UL (ref 0.3–1)
MONOCYTES NFR BLD: 6.2 % (ref 4–15)
NEUTROPHILS # BLD AUTO: 8.2 K/UL (ref 1.8–7.7)
NEUTROPHILS NFR BLD: 75.9 % (ref 38–73)
PLATELET # BLD AUTO: 360 K/UL (ref 150–350)
PMV BLD AUTO: 11 FL (ref 9.2–12.9)
RBC # BLD AUTO: 5.57 M/UL (ref 4–5.4)
WBC # BLD AUTO: 10.86 K/UL (ref 3.9–12.7)

## 2019-11-06 PROCEDURE — 85025 COMPLETE CBC W/AUTO DIFF WBC: CPT | Mod: PO

## 2019-11-06 PROCEDURE — 36415 COLL VENOUS BLD VENIPUNCTURE: CPT | Mod: PO

## 2019-11-06 RX ORDER — DALFAMPRIDINE 10 MG/1
1 TABLET, FILM COATED, EXTENDED RELEASE ORAL EVERY 12 HOURS
Qty: 60 TABLET | Refills: 5 | Status: SHIPPED | OUTPATIENT
Start: 2019-11-06 | End: 2020-11-05

## 2019-11-06 NOTE — TELEPHONE ENCOUNTER
"----- Message from Angela Link sent at 11/6/2019  3:25 PM CST -----  Regarding: Issa Moura & Staff,    Dalfampridine prior authorization has been approved through 11/4/20.    Patient's insurance requires the patient to fill through [facility].     Please send prescription to [facility], which has been added to the patient's EPIC profile. Patient has been notified and provided with the necessary info to call and schedule a delivery. To complete this in EPIC, the original order MUST be discontinued and re-typed as a new prescription with the updated pharmacy listed. Clicking "reorder" will continue to route the rx to [facility] even if the pharmacy is changed. Please opt the patient out of [facility] when the BPA is fired.     Thanks,  Angela Link  "

## 2019-11-08 ENCOUNTER — PATIENT MESSAGE (OUTPATIENT)
Dept: NEUROLOGY | Facility: CLINIC | Age: 44
End: 2019-11-08

## 2019-11-11 ENCOUNTER — PATIENT MESSAGE (OUTPATIENT)
Dept: PHARMACY | Facility: CLINIC | Age: 44
End: 2019-11-11

## 2019-11-13 ENCOUNTER — OFFICE VISIT (OUTPATIENT)
Dept: INFECTIOUS DISEASES | Facility: CLINIC | Age: 44
End: 2019-11-13
Payer: COMMERCIAL

## 2019-11-13 ENCOUNTER — CLINICAL SUPPORT (OUTPATIENT)
Dept: INFECTIOUS DISEASES | Facility: CLINIC | Age: 44
End: 2019-11-13
Payer: COMMERCIAL

## 2019-11-13 VITALS
SYSTOLIC BLOOD PRESSURE: 124 MMHG | DIASTOLIC BLOOD PRESSURE: 80 MMHG | BODY MASS INDEX: 38.81 KG/M2 | HEIGHT: 64 IN | WEIGHT: 227.31 LBS | HEART RATE: 99 BPM | TEMPERATURE: 99 F

## 2019-11-13 DIAGNOSIS — Z79.899 LONG TERM CURRENT USE OF IMMUNOSUPPRESSIVE DRUG: Primary | ICD-10-CM

## 2019-11-13 DIAGNOSIS — Z79.899 LONG TERM CURRENT USE OF IMMUNOSUPPRESSIVE DRUG: ICD-10-CM

## 2019-11-13 PROCEDURE — 90632 HEPATITIS A VACCINE ADULT IM: ICD-10-PCS | Mod: S$GLB,,, | Performed by: INTERNAL MEDICINE

## 2019-11-13 PROCEDURE — 90670 PCV13 VACCINE IM: CPT | Mod: S$GLB,,, | Performed by: INTERNAL MEDICINE

## 2019-11-13 PROCEDURE — 99204 OFFICE O/P NEW MOD 45 MIN: CPT | Mod: 25,S$GLB,, | Performed by: PHYSICIAN ASSISTANT

## 2019-11-13 PROCEDURE — 90686 IIV4 VACC NO PRSV 0.5 ML IM: CPT | Mod: S$GLB,,, | Performed by: INTERNAL MEDICINE

## 2019-11-13 PROCEDURE — 90715 TDAP VACCINE 7 YRS/> IM: CPT | Mod: S$GLB,,, | Performed by: INTERNAL MEDICINE

## 2019-11-13 PROCEDURE — 90670 PNEUMOCOCCAL CONJUGATE VACCINE 13-VALENT LESS THAN 5YO & GREATER THAN: ICD-10-PCS | Mod: S$GLB,,, | Performed by: INTERNAL MEDICINE

## 2019-11-13 PROCEDURE — 90472 IMMUNIZATION ADMIN EACH ADD: CPT | Mod: S$GLB,,, | Performed by: INTERNAL MEDICINE

## 2019-11-13 PROCEDURE — 90686 FLU VACCINE (QUAD) GREATER THAN OR EQUAL TO 3YO PRESERVATIVE FREE IM: ICD-10-PCS | Mod: S$GLB,,, | Performed by: INTERNAL MEDICINE

## 2019-11-13 PROCEDURE — 99999 PR PBB SHADOW E&M-EST. PATIENT-LVL III: ICD-10-PCS | Mod: PBBFAC,,, | Performed by: PHYSICIAN ASSISTANT

## 2019-11-13 PROCEDURE — 90471 FLU VACCINE (QUAD) GREATER THAN OR EQUAL TO 3YO PRESERVATIVE FREE IM: ICD-10-PCS | Mod: S$GLB,,, | Performed by: INTERNAL MEDICINE

## 2019-11-13 PROCEDURE — 90471 IMMUNIZATION ADMIN: CPT | Mod: S$GLB,,, | Performed by: INTERNAL MEDICINE

## 2019-11-13 PROCEDURE — 90632 HEPA VACCINE ADULT IM: CPT | Mod: S$GLB,,, | Performed by: INTERNAL MEDICINE

## 2019-11-13 PROCEDURE — 90472 HEPATITIS A VACCINE ADULT IM: ICD-10-PCS | Mod: S$GLB,,, | Performed by: INTERNAL MEDICINE

## 2019-11-13 PROCEDURE — 99204 PR OFFICE/OUTPT VISIT, NEW, LEVL IV, 45-59 MIN: ICD-10-PCS | Mod: 25,S$GLB,, | Performed by: PHYSICIAN ASSISTANT

## 2019-11-13 PROCEDURE — 99999 PR PBB SHADOW E&M-EST. PATIENT-LVL III: CPT | Mod: PBBFAC,,, | Performed by: PHYSICIAN ASSISTANT

## 2019-11-13 PROCEDURE — 90715 TDAP VACCINE GREATER THAN OR EQUAL TO 7YO IM: ICD-10-PCS | Mod: S$GLB,,, | Performed by: INTERNAL MEDICINE

## 2019-11-13 NOTE — PROGRESS NOTES
Pre Biologic Response Modifier Therapy Consult  BMR Recipient Evaluation      Reason for Visit:    Chief Complaint   Patient presents with    Immunizations     History of Present Illness  Vik May is a 44 y.o. year old Unknown female with advanced Multiple Sclerosis currently being evaluated for prior to starting biologic response modifer (BRM) therapy.  Patient denies any recent fever, chills, or infective infective illnesses. Plans to start Ocrevius     1) Do you have a history of:    YES NO   Diabetes                 []       [x]   Diabetic Foot Infection/Bone Infection  []  [x]   Surgical Removal of Spleen    []  [x]       2) Have you had recurrent infections involving:             YES NO  Sinus infections  []  [x]  Sore Throat   []  [x]                             Prostate Infections  []  [x]  .                         Bladder Infections  []  [x]                                 Kidney Infections  []  [x]        Intestinal Infections  []  [x]   Skin Infections   []  [x]                   Reproductive Infections           +vaginitis. See OBGYN. Took abx pill.  Has not had to take anything for years.     Periodontal Disease                   3)Have you ever had: YES     NO UNKNOWN      Chicken Pox   [x]  []  []                 Shingles   []  [x]  []                Orolabial Herpes             []  [x]  []       Genital Herpes  []  [x]  []           Cytomegalovirus  []  [x]  []               Fernandez-Barr Virus  []  [x]  []              Genital Warts   []  [x]  []                Hepatitis A   []  [x]  []             Hepatitis B   []  [x]  []                Hepatitis C   []  [x]  []                 Syphilis   []  [x]  []                Gonorrhea   []  [x]  []                 Pelvic Inflammatory  []  [x]  []   Disease   []  [x]  []                    Chlamydia Infection  []  [x]  []                Intestinal parasites        or worms   []  [x]  []                 Fungal Infections  []  [x]  []                Blood  Infections  []  [x]  []       4) Have you ever been exposed   YES NO  to someone with tuberculosis?  []  [x]   If yes, what treatment did you receive:     5) What states have you lived in? [place]     6) What countries have you visited for more than 2 weeks?  none                        YES NO  7) Did you have any associated infections?     []  [x]     8) Are you planning to travel outside the           [place] after starting BRMs?    []   [x]       Household                  YES NO  9 Do you have pets living in your house?   [x]   []       Fish (freshwater)       If yes, describe:     Do you spend time or live on a farm or                 have livestock or other farm animals?   []  [x]   If yes, which ones:    Do you have a fish tank?     [x]  []                       Do you have a litter box?     []  [x]                        Do you fish or hunt?      []  [x]                       Do you clean or skin fish or animals?   []  [x]                      Do you consume raw or undercooked                meat, fish, or shellfish?     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    []  [x]   13)Do you hike, camp, or spend   time in wooded areas?   []  [x]                           14) The patient's immunization history was reviewed.   Have you ever received:  YES NO UNKNOWN DATES  Routine Childhood vaccines  [x]  []  []                Influenza vaccine   []  [x]  []         Pneumonia    []  [x]  []      Tetanus-diptheria   []  [x]  []   Hepatitis A vaccine series       []  [x]  []       Hepatitis B vaccine series       [x]  []  []        Meningitis vaccine   []  [x]  []     Varicella vaccine   []  [x]  []                  Based on the patients immunization history and serologies, immunizations were ordered:         Ordered  Not Ordered  Influenza Vaccine     [x]    []   Hepatitis A series at 0, 6 months   [x]    []    Hepatitis B sries at 0, 1, and 6 months  []    [x]   Hepatitis B High Dose series 0,1, and 6 months []    [x]   Prevnar      [x]    []   Pneumovax      []    [x]   TDap       [x]    []   Zoster       []    [x]   Menactra      []    [x]   HiB       []    [x]               The patient was encouraged to contact us about any problems that may develop after immunization and possible side effects were reviewed.       Allergies: Patient has no known allergies.  Immunization History   Administered Date(s) Administered    Hepatitis A, Adult 11/13/2019    Influenza - Quadrivalent - PF (6 months and older) 11/13/2019    Pneumococcal Conjugate - 13 Valent 11/13/2019    Tdap 11/13/2019     Past Medical History:   Diagnosis Date    Multiple sclerosis      History reviewed. No pertinent surgical history.   Social History     Socioeconomic History    Marital status:      Spouse name: Not on file    Number of children: 3    Years of education: Not on file    Highest education level: Not on file   Occupational History    Not on file   Social Needs    Financial resource strain: Not on file    Food insecurity:     Worry: Not on file     Inability: Not on file    Transportation needs:     Medical: Not on file     Non-medical: Not on file   Tobacco Use    Smoking status: Current Every Day Smoker     Packs/day: 0.20     Years: 9.00     Pack years: 1.80     Types: Cigarettes    Smokeless tobacco: Never Used   Substance and Sexual Activity    Alcohol use: Yes     Frequency: Monthly or less    Drug use: Not Currently    Sexual activity: Yes     Birth control/protection: Pill   Lifestyle    Physical activity:     Days per week: Not on file     Minutes per session: Not on file    Stress: Not on file   Relationships    Social connections:     Talks on phone: Not on file     Gets together: Not on file     Attends Sabianism service: Not on file     Active member of club or organization: Not on file     Attends  meetings of clubs or organizations: Not on file     Relationship status: Not on file   Other Topics Concern    Not on file   Social History Narrative    Not on file       Review of Systems   Constitution: Positive for malaise/fatigue. Negative for chills and fever.   Cardiovascular: Negative for dyspnea on exertion.   Respiratory: Negative for shortness of breath.    Gastrointestinal: Negative for diarrhea, nausea and vomiting.   Genitourinary: Negative for dysuria.     Physical Exam   Constitutional: She appears well-developed and well-nourished. No distress.   HENT:   Head: Normocephalic.   Eyes: Pupils are equal, round, and reactive to light.   Cardiovascular: Normal rate, regular rhythm and normal heart sounds. Exam reveals no friction rub.   No murmur heard.  Pulmonary/Chest: Effort normal. No stridor. No respiratory distress.   Abdominal: Soft. She exhibits no distension. There is no tenderness.   Musculoskeletal: Normal range of motion.   Neurological: She is alert.   Skin: Skin is warm and dry. She is not diaphoretic. No erythema. No pallor.   Psychiatric: She has a normal mood and affect.   Vitals reviewed.    Diagnostics:   RPR   Date Value Ref Range Status   10/16/2019 Non-reactive Non-reactive Final     No results found for: CMVANTIBODIE  No results found for: HIV1X2  No results found for: HTLVIIIANTIB  Hepatitis B Surface Ag   Date Value Ref Range Status   10/16/2019 Negative Negative Final     Hep B Core Total Ab   Date Value Ref Range Status   10/16/2019 Negative  Final     Hepatitis C Ab   Date Value Ref Range Status   10/16/2019 Negative Negative Final     No results found for: TOXOIGG  No components found for: TOXOIGGINTER  No results found for: OSV2ZVQ  No results found for: FTB5YMJ  Varicella Zoster IgG   Date Value Ref Range Status   10/16/2019 3.31 (H) 0.00 - 0.90 ISR Final     Varicella Interpretation   Date Value Ref Range Status   10/16/2019 Positive (A) Negative Final     Comment:      <or=0.90     Negative        No detectable IgG antibody to Varicella zoster  by the MINNA test. Such individuals are presumed to be   uninfected with Varicella zoster and to be susceptible to   primary infection.  0.91-1.09    Equivocal  >or=1.10     Positive        Indicates presence of detectable IgG antibody to Varicella   zoster by the MINNA test. Indicative of previous or current   infection.       Strongyloides Ab IgG   Date Value Ref Range Status   10/16/2019 Negative Negative Final     Comment:     No detectable levels of IgG antibodies to Strongyloides.  Repeat testing in 1-2 weeks if clinically indicated.  Test Performed by:  [facility]  [address] [place] [address]  : George Manning M.D. Ph.D.; CLIA# [id number]       No results found for: EPSTEINBARRV  Hep B S Ab   Date Value Ref Range Status   10/16/2019 Positive (A)  Final     No results found for: QUANTIFERON  No results found for: HEPAIGM  No results found for: PPD  No results found for this or any previous visit.        BRM - Candidacy    Biologic Response Modifier Candidacy: Based on available information, there are no identified significant barriers to BRMs from an infectious disease standpoint pending acceptable serologies.  Final determination of BRM candidacy will be made once evaluation is complete and reviewed.    HIV, QUANTGOLD TB, STRONGYLOIDES, HEP C NEGATIVE    Vaccines today  1. Influenza yearly  2. prevnar today. Pneumovax 8 weeks after  3. Hepatitis A today, 6 months  4. tdap today, [id number]  5. shingrix when available.     Javier Walker PA-C        Counseling:I discussed with Vik the risk for increased susceptibility to infections following BRM therapy including increased risk for infection.  The patients has been counseled on the importance of vaccinations including but not limited to a yearly flu vaccine.Specific guidance has been provided to the patient  regarding the patients occupation, hobbies and activities to avoid future infectious complications including but not limited to avoiding undercooked meats and seafood, proper hygiene, and contact with animals.

## 2019-11-13 NOTE — LETTER
November 13, 2019      Daisy Moura, APRN, CNS  [address]  [facility] [address]           [facility] - Infectious Diseases  [address]  [facility] [address]  Phone: [phone]  Fax: [phone]          Patient: Vik May   MR Number: [id number]   YOB: 1975   Date of Visit: 11/13/2019       Dear Daisy Moura:    Thank you for referring Vik May to me for evaluation. Attached you will find relevant portions of my assessment and plan of care.    If you have questions, please do not hesitate to call me. I look forward to following Vik May along with you.    Sincerely,    Javier Walker PA-C    Enclosure  CC:  No Recipients    If you would like to receive this communication electronically, please contact [email or web address] or [phone] to request more information on [employer] access.    For providers and/or their staff who would like to refer a patient to [facility], please contact us through our one-stop-shop provider referral line, [facility], at [phone].    If you feel you have received this communication in error or would no longer like to receive these types of communications, please e-mail [email or web address]

## 2019-11-13 NOTE — PROGRESS NOTES
Ms. May received Flu, Hepatitis A, Prevnar 13 and Tdap vaccines   Tolerated well  Left unit in NAD

## 2019-11-14 ENCOUNTER — DOCUMENTATION ONLY (OUTPATIENT)
Dept: NEUROLOGY | Facility: CLINIC | Age: 44
End: 2019-11-14

## 2019-11-14 ENCOUNTER — TELEPHONE (OUTPATIENT)
Dept: NEUROLOGY | Facility: CLINIC | Age: 44
End: 2019-11-14

## 2019-11-15 NOTE — TELEPHONE ENCOUNTER
----- Message from Susan Moeller RN sent at 11/14/2019  5:41 PM CST -----  Contact: Gregorio (Cathy lazcano pre-cert) @ [phone] ref# [id number]      ----- Message -----  From: Marisa Lagunas  Sent: 11/14/2019   4:19 PM CST  To: Zulma Adler A Staff    Asking to speak with someone in Dr. Maya's office regarding a prior auth request, says additional information is needed, medication: Ocrevus. Please call.

## 2019-11-19 ENCOUNTER — PATIENT MESSAGE (OUTPATIENT)
Dept: NEUROLOGY | Facility: CLINIC | Age: 44
End: 2019-11-19

## 2019-11-22 ENCOUNTER — TELEPHONE (OUTPATIENT)
Dept: NEUROLOGY | Facility: CLINIC | Age: 44
End: 2019-11-22

## 2019-11-22 NOTE — TELEPHONE ENCOUNTER
[facility] information. Case is under further review.

## 2019-11-22 NOTE — TELEPHONE ENCOUNTER
----- Message from Susan Moeller RN sent at 11/22/2019  9:34 AM CST -----  Contact: Aubrie pradhan/ Nadine Linn @ [phone] (M-F 8AM-7P EST)      ----- Message -----  From: Joseph Kaye  Sent: 11/22/2019   9:08 AM CST  To: [facility]ulma CASEY Staff    Aubrie wanted to let the doctor know they received PA request for Ocrevus, but they would like pt to consider taking Aubagio, Avonex, Etaseron, Gilenya, Limtrada, Plegridy, Rebif, Tecfidera, Glatopa, and Copaxone,  due to Ocrevus cost. Aubrie is asking for feedback before end of day today.

## 2019-11-25 ENCOUNTER — TELEPHONE (OUTPATIENT)
Dept: NEUROLOGY | Facility: CLINIC | Age: 44
End: 2019-11-25

## 2019-11-25 DIAGNOSIS — R79.89 ABNORMAL CBC: Primary | ICD-10-CM

## 2019-12-04 ENCOUNTER — TELEPHONE (OUTPATIENT)
Dept: NEUROLOGY | Facility: CLINIC | Age: 44
End: 2019-12-04

## 2019-12-04 ENCOUNTER — LAB VISIT (OUTPATIENT)
Dept: LAB | Facility: HOSPITAL | Age: 44
End: 2019-12-04
Attending: CLINICAL NURSE SPECIALIST
Payer: COMMERCIAL

## 2019-12-04 DIAGNOSIS — R79.89 ABNORMAL CBC: Primary | ICD-10-CM

## 2019-12-04 DIAGNOSIS — R79.89 ABNORMAL CBC: ICD-10-CM

## 2019-12-04 LAB
BASOPHILS # BLD AUTO: 0.07 K/UL (ref 0–0.2)
BASOPHILS NFR BLD: 0.6 % (ref 0–1.9)
DIFFERENTIAL METHOD: ABNORMAL
EOSINOPHIL # BLD AUTO: 0.3 K/UL (ref 0–0.5)
EOSINOPHIL NFR BLD: 2.7 % (ref 0–8)
ERYTHROCYTE [DISTWIDTH] IN BLOOD BY AUTOMATED COUNT: 17.7 % (ref 11.5–14.5)
HCT VFR BLD AUTO: 35.4 % (ref 37–48.5)
HGB BLD-MCNC: 11.8 G/DL (ref 12–16)
LYMPHOCYTES # BLD AUTO: 2 K/UL (ref 1–4.8)
LYMPHOCYTES NFR BLD: 16.7 % (ref 18–48)
MCH RBC QN AUTO: 23.5 PG (ref 27–31)
MCHC RBC AUTO-ENTMCNC: 33.3 G/DL (ref 32–36)
MCV RBC AUTO: 70 FL (ref 82–98)
MONOCYTES # BLD AUTO: 0.8 K/UL (ref 0.3–1)
MONOCYTES NFR BLD: 6.5 % (ref 4–15)
NEUTROPHILS # BLD AUTO: 8.8 K/UL (ref 1.8–7.7)
NEUTROPHILS NFR BLD: 73.5 % (ref 38–73)
PLATELET # BLD AUTO: 50 K/UL (ref 150–350)
PLATELET BLD QL SMEAR: ABNORMAL
PMV BLD AUTO: ABNORMAL FL (ref 9.2–12.9)
RBC # BLD AUTO: 5.03 M/UL (ref 4–5.4)
WBC # BLD AUTO: 12.03 K/UL (ref 3.9–12.7)

## 2019-12-04 PROCEDURE — 36415 COLL VENOUS BLD VENIPUNCTURE: CPT | Mod: PO

## 2019-12-04 PROCEDURE — 85025 COMPLETE CBC W/AUTO DIFF WBC: CPT | Mod: 91,PO

## 2019-12-04 NOTE — TELEPHONE ENCOUNTER
----- Message from Liya Edwardceasar sent at 12/4/2019 12:15 PM CST -----  Contact: lab at [facility] at [phone]//Adamaris Moura pt-Pt needs to be redrawn due to possible platelet clumping.  They want to speak to the nurse about this.  Only thing that would be inaccurate would be platelets.  She was a difficult stick.  Pleae call asap.  Thanks

## 2019-12-04 NOTE — TELEPHONE ENCOUNTER
Platelets were 50 (perhaps due to clumping). New order placed. She will go back this afternoon to lab.

## 2019-12-05 ENCOUNTER — PATIENT MESSAGE (OUTPATIENT)
Dept: PHARMACY | Facility: CLINIC | Age: 44
End: 2019-12-05

## 2019-12-05 DIAGNOSIS — G35 MULTIPLE SCLEROSIS: ICD-10-CM

## 2019-12-05 DIAGNOSIS — R53.83 FATIGUE, UNSPECIFIED TYPE: ICD-10-CM

## 2019-12-05 RX ORDER — AMANTADINE HYDROCHLORIDE 100 MG/1
TABLET ORAL
Qty: 60 TABLET | Refills: 5 | Status: SHIPPED | OUTPATIENT
Start: 2019-12-05 | End: 2021-01-15

## 2019-12-12 ENCOUNTER — PATIENT MESSAGE (OUTPATIENT)
Dept: NEUROLOGY | Facility: CLINIC | Age: 44
End: 2019-12-12

## 2019-12-14 ENCOUNTER — PATIENT MESSAGE (OUTPATIENT)
Dept: NEUROLOGY | Facility: CLINIC | Age: 44
End: 2019-12-14

## 2019-12-16 ENCOUNTER — PATIENT MESSAGE (OUTPATIENT)
Dept: NEUROLOGY | Facility: CLINIC | Age: 44
End: 2019-12-16

## 2019-12-19 ENCOUNTER — OFFICE VISIT (OUTPATIENT)
Dept: NEUROLOGY | Facility: CLINIC | Age: 44
End: 2019-12-19
Payer: COMMERCIAL

## 2019-12-19 VITALS
BODY MASS INDEX: 38.94 KG/M2 | SYSTOLIC BLOOD PRESSURE: 125 MMHG | HEIGHT: 64 IN | HEART RATE: 94 BPM | DIASTOLIC BLOOD PRESSURE: 80 MMHG | WEIGHT: 228.06 LBS

## 2019-12-19 DIAGNOSIS — Z79.899 HIGH RISK MEDICATION USE: ICD-10-CM

## 2019-12-19 DIAGNOSIS — G35 MULTIPLE SCLEROSIS: Primary | ICD-10-CM

## 2019-12-19 DIAGNOSIS — Z71.89 COUNSELING REGARDING GOALS OF CARE: ICD-10-CM

## 2019-12-19 DIAGNOSIS — G47.9 SLEEP DISTURBANCE: ICD-10-CM

## 2019-12-19 DIAGNOSIS — R26.9 GAIT DISTURBANCE: ICD-10-CM

## 2019-12-19 DIAGNOSIS — Z29.89 PROPHYLACTIC IMMUNOTHERAPY: ICD-10-CM

## 2019-12-19 DIAGNOSIS — R53.83 FATIGUE, UNSPECIFIED TYPE: ICD-10-CM

## 2019-12-19 PROCEDURE — 99999 PR PBB SHADOW E&M-EST. PATIENT-LVL III: CPT | Mod: PBBFAC,,, | Performed by: CLINICAL NURSE SPECIALIST

## 2019-12-19 PROCEDURE — 99215 OFFICE O/P EST HI 40 MIN: CPT | Mod: S$GLB,,, | Performed by: CLINICAL NURSE SPECIALIST

## 2019-12-19 PROCEDURE — 99999 PR PBB SHADOW E&M-EST. PATIENT-LVL III: ICD-10-PCS | Mod: PBBFAC,,, | Performed by: CLINICAL NURSE SPECIALIST

## 2019-12-19 PROCEDURE — 99215 PR OFFICE/OUTPT VISIT, EST, LEVL V, 40-54 MIN: ICD-10-PCS | Mod: S$GLB,,, | Performed by: CLINICAL NURSE SPECIALIST

## 2019-12-19 NOTE — PROGRESS NOTES
Subjective:       Patient ID: Vik May is a 44 y.o. female who presents today for a routine clinic visit for MS.  She was last seen in October 2019. The history has been provided by the patient. She is accompanied by her son.     MS HPI:  · DMT: Tecfidera 240mg twice daily; will stop on 12/31; she is scheduled for Ocrevus on 1/15 and 1/29  · Side effects from DMT? No  · Taking vitamin D3 as recommended? Yes -  Dose: about 2000 units daily   · She denies any new or different symptoms.   · She did PT for a few sessions, but it has been hard to get there on time with her work schedule.   · She started amantadine, which has helped slightly, but has not helped drastically.   · She takes Ampyra, but she is not sure if it is helpful.     SOCIAL HISTORY  Social History     Tobacco Use    Smoking status: Current Every Day Smoker     Packs/day: 0.20     Years: 9.00     Pack years: 1.80     Types: Cigarettes    Smokeless tobacco: Never Used   Substance Use Topics    Alcohol use: Yes     Frequency: Monthly or less    Drug use: Not Currently     Living arrangements - the patient lives with her family.  Employment: works full-time        MS REVIEW OF SYMPTOMS 12/13/2019   Do you feel abnormally tired on most days? Yes   Do you feel you generally sleep well? Yes--amitriptyline made her feel groggy in the morning    Do you have difficulty controlling your bladder?  Yes--detrol helps    Do you have difficulty controlling your bowels?  Yes--bowels are ok    Do you have frequent muscle cramps, tightness or spasms in your limbs?  No    Do you have new visual symptoms?  No   Do you have worsening difficulty with your memory or thinking? Yes--has noticed a change in her attention and concentration; forgets what she is doing sometimes    Do you have worsening symptoms of anxiety or depression?  No; situational; manages it well    For patients who walk, Do you have more difficulty walking?  Yes   Have you fallen since your last  visit?  Yes--one or two falls since the last visit due to loss of balance    For patients who use wheelchairs: Do you have any skin wounds or breakdown? No   Do you have difficulty using your hands?  No   Do you have shooting or burning pain? No   Do you have difficulty with sexual function?  Yes   If you are sexually active, are you using birth control? Y/N  N/A Yes   Do you often choke when swallowing liquids or solid food?  Yes--also notices this during sleep; feels like she chokes on her saliva    Do you experience worsening symptoms when overheated? Yes; feels affected by heat and cold. Heat makes her tired; cold makes her body ache.    Do you need any new equipment such as a wheelchair, walker or shower chair? No   Do you receive co-pay financial assistance for your principal MS medicine? Yes--will get copay assistance for Ocrevus    Would you be interested in participating in an MS research trial in the future? No   Do you feel you have adequate family/friend support?  Yes   Do you have health insurance?   Yes   Are you currently employed? Yes   Do you receive SSDI/SSI?  No   Do you use marijuana or cannabis products? No   How often? Monthly   Have you been diagnosed with a urinary tract infection since your last visit here? No   Have you been diagnosed with a respiratory tract infection since your last visit here? No   Have you been to the emergency room since your last visit here? No   Have you been hospitalized since your last visit here?  No     FSS SCORE & INTERPRETATION 12/13/2019   FSS SCORE  29   FSS SCORE INTERPRETATION May not be suffering from fatigue     MS JOSEPH-D SCORE & INTERPRETATION 12/13/2019   JOSEPH-D SCORE  5   JOSEPH-D INTERPRETATION  No indication of Depression     MS DENISSE-7 SCORE & INTERPRETATION 12/13/2019   DENISSE-7 SCORE  8   DENISSE-7 SCORE INTERPRETATION Mild Anxiety     PEQ MS MOS PAIN EFFECTS SCORE & INTERPRETATION 12/13/2019   PES SCORE 8   PES SCORE INTERPRETATION Scores can range from 6-30.   Items are scaled so that higher scores indicate a greater impact of pain on a patients mood and behavior.     PEQ MS SEXUAL SATISFACTION SCORE & INTERPRETATION 12/13/2019   SSS SCORE  15   SSS SCORE INTERPRETATION Scores can range from 4-24.  Higher scores indicate greater problems with sexual satisfaction.     MS BLADDER CONTROL SCORE & INTERPRETATION 12/13/2019   BLCS SCORE 8   BLCS SCORE INTERPRETATION  Scores can range from 0-22, with higher scores indicating greater bladder control problems.     MS BOWEL CONTROL SCORE & INTERPRETATION 12/13/2019   BWCS SCORE 8   BWCS SCORE INTERPRETATION Scores can range from 0-26, with higher scores indicating greater bowel control problems.     PEQ MS IMPACT OF VISUAL IMPAIRMENT SCORE & INTERPRETATION 12/13/2019   XENIA SCALE SCORE  0   XENIA SCORE INTERPRETATION Scores can range from 0-15, with higher scores indicating greater impact of visual problems on daily activites.     MS PDQ SCORE & INTERPRETATION 12/13/2019   PDQ RETROSPECTIVE MEMORY SUBSCALE 12   PDQ ATTENTION/CONCENTRATION SUBSCALE 12   PDQ PROSPECTIVE MEMORY SUBSCALE 11   PDQ PLANNING/ORGANIZATION SUBSCALE 15   PDQ TOTAL SCORE 50   PDQ SCORE INTERPRETATION Scores can range from 0-80, with higher scores indicating greater perceived cognitive impairment.     MSSS SCORE & INTERPRETATION 12/13/2019   MSSS TANGIBLE SUPPORT SUBSCALE 68.75   MSSS EMOTIONAL/INFORMATIONAL SUPPORT SUBSCALE 68.75   MSSS AFFECTIONATE SUPPORT SUBSCALE 75   MSSS POSITIVE SOCIAL INTERACTION SUBSCALE 66.67   MSSS TOTAL SCORE 69.79   MSSS SCORE INTERPRETATION Scores can range from 0-100, with higher scores indicating greater perceived support.           Objective:        1. 25 foot timed walk: 10.6 seconds today without assist   Timed 25 Foot Walk: 10/16/2019   Did patient wear an AFO? No   Was assistive device used? No   Time for 25 Foot Walk (seconds) 13.79       Neurologic Exam      In general, the patient is well nourished.     MENTAL STATUS:  language is fluent, normal verbal comprehension, short-term and remote memory is somewhat impaired based on history giving, attention is normal, patient is alert and oriented x 3, fund of knowlege is appropriate by vocabulary.      CRANIAL NERVE EXAM:   There is no BELIA.  Extraocular muscles are intact. Pupils are equal, round, and reactive to light. No facial asymmetry. Facial sensation is intact bilaterally. There is no dysarthria. Uvula is midline, and palate moves symmetrically. Shoulder shrug intact bilaterally. Tongue protrusion is midline. Hearing is grossly intact. Neck is supple.      MOTOR EXAM: slow RSM bilaterally;  4/5 IO right hand, o/w 5/5 in other UE groups;   Right HF 3/5, KF 4/5, DF 4/5; LLE 4+/5 in all flexor groups     REFLEXES: 3+ and symmetric throughout in all four extremities; toes are up bilaterally    SENSORY EXAM: decreased sensation distal LE in stocking distribution, and right UE     COORDINATION: Normal finger-to-nose exam      GAIT: wide based and slow; favors right leg    Imaging:     No new imaging to review today.     Labs:     Lab Results   Component Value Date    PZKHYGDL16GO 37 10/16/2019     Lab Results   Component Value Date    JCVINDEX 2.38 (A) 10/16/2019    JCVANTIBODY Positive (A) 10/16/2019     Lab Results   Component Value Date    VA1IIYHN 77.4 10/16/2019    ABSOLUTECD3 1561 10/16/2019    LK3CQMOP 14.5 10/16/2019    ABSOLUTECD8 292 10/16/2019    TA7NWSBA 63.4 (H) 10/16/2019    ABSOLUTECD4 1277 10/16/2019    LABCD48 4.38 (H) 10/16/2019     Lab Results   Component Value Date    WBC 12.57 12/04/2019    RBC 4.82 12/04/2019    HGB 11.3 (L) 12/04/2019    HCT 34.5 (L) 12/04/2019    MCV 72 (L) 12/04/2019    MCH 23.4 (L) 12/04/2019    MCHC 32.8 12/04/2019    RDW 17.7 (H) 12/04/2019     12/04/2019    MPV 10.2 12/04/2019    GRAN 8.5 (H) 12/04/2019    GRAN 68.0 12/04/2019    LYMPH 2.8 12/04/2019    LYMPH 21.9 12/04/2019    MONO 0.9 12/04/2019    MONO 7.2 12/04/2019    EOS 0.3  12/04/2019    BASO 0.05 12/04/2019    EOSINOPHIL 2.5 12/04/2019    BASOPHIL 0.4 12/04/2019     Sodium   Date Value Ref Range Status   10/16/2019 142 136 - 145 mmol/L Final     Potassium   Date Value Ref Range Status   10/16/2019 3.5 3.5 - 5.1 mmol/L Final     Chloride   Date Value Ref Range Status   10/16/2019 107 95 - 110 mmol/L Final     CO2   Date Value Ref Range Status   10/16/2019 24 23 - 29 mmol/L Final     Glucose   Date Value Ref Range Status   10/16/2019 103 70 - 110 mg/dL Final     BUN, Bld   Date Value Ref Range Status   10/16/2019 8 6 - 20 mg/dL Final     Creatinine   Date Value Ref Range Status   10/16/2019 0.6 0.5 - 1.4 mg/dL Final     Calcium   Date Value Ref Range Status   10/16/2019 9.4 8.7 - 10.5 mg/dL Final     Total Protein   Date Value Ref Range Status   10/16/2019 7.4 6.0 - 8.4 g/dL Final     Albumin   Date Value Ref Range Status   10/16/2019 3.9 3.5 - 5.2 g/dL Final     Total Bilirubin   Date Value Ref Range Status   10/16/2019 0.3 0.1 - 1.0 mg/dL Final     Comment:     For infants and newborns, interpretation of results should be based  on gestational age, weight and in agreement with clinical  observations.  Premature Infant recommended reference ranges:  Up to 24 hours.............<8.0 mg/dL  Up to 48 hours............<12.0 mg/dL  3-5 days..................<15.0 mg/dL  6-29 days.................<15.0 mg/dL       Alkaline Phosphatase   Date Value Ref Range Status   10/16/2019 57 55 - 135 U/L Final     AST   Date Value Ref Range Status   10/16/2019 15 10 - 40 U/L Final     ALT   Date Value Ref Range Status   10/16/2019 12 10 - 44 U/L Final     Anion Gap   Date Value Ref Range Status   10/16/2019 11 8 - 16 mmol/L Final     eGFR if    Date Value Ref Range Status   10/16/2019 >60.0 >60 mL/min/1.73 m^2 Final     eGFR if non    Date Value Ref Range Status   10/16/2019 >60.0 >60 mL/min/1.73 m^2 Final     Comment:     Calculation used to obtain the estimated  glomerular filtration  rate (eGFR) is the CKD-EPI equation.          Diagnosis/Assessment/Plan:    1. Multiple Sclerosis  · Assessment: Vik's walk time is 3 seconds faster today. The remainder of her exam is stable.   · Imaging: MRI brain at 6 month interval after Ocrevus start.   · Disease Modifying Therapies: She will stop Tecfidera two weeks prior to starting Ocrevus. Her first infusion is on 1/15. She will need labs for CBC, CD20, and hepatitis B in June.  She is aware of the risks associated with immunosupppressant therapy, including increased risk of infection. We will check CBC and CD8 prior to Ocrevus start.     2. MS Symptom Assessment / Management  · Fatigue: Continue amantadine.   · Sleep Disturbance: Recommend Magnesium glycinate 400mg; may consider a sleep study in the future.   · Bladder Dysfunction: Continue Detrol.   · Gait Disturbance: Continue Ampyra. Will check BMP for creatinine clearance in May.   · Dysphagia:  Will monitor.      Our visit today lasted 40 minutes, and 100% of this time was spent face to face with the patient. Over 50% of this visit included discussion of the treatment plan/medication changes/symptom management/exam findings/coordination of care. The patient agrees with the plan of care. She will follow up with Dr. Maya in early April.     Daisy Moura, AGCNS-BC, MSCN    Problem List Items Addressed This Visit     None      Visit Diagnoses     Multiple sclerosis    -  Primary    Relevant Orders    MRI Brain Demyelinating W W/O Contrast    CBC auto differential    CBC auto differential    Rituxan Sensitivity    [place]-Suppressor Ratio    Hepatitis B core antibody, total    Hepatitis B surface antibody    Hepatitis B surface antigen

## 2019-12-20 RX ORDER — EPINEPHRINE 0.3 MG/.3ML
0.3 INJECTION SUBCUTANEOUS
Status: CANCELLED | OUTPATIENT
Start: 2019-12-20

## 2019-12-20 RX ORDER — FAMOTIDINE 10 MG/ML
20 INJECTION INTRAVENOUS
Status: CANCELLED | OUTPATIENT
Start: 2019-12-20

## 2019-12-20 RX ORDER — ACETAMINOPHEN 325 MG/1
1000 TABLET ORAL
Status: CANCELLED | OUTPATIENT
Start: 2019-12-20

## 2019-12-20 RX ORDER — SODIUM CHLORIDE 0.9 % (FLUSH) 0.9 %
10 SYRINGE (ML) INJECTION
Status: CANCELLED | OUTPATIENT
Start: 2019-12-20

## 2019-12-20 RX ORDER — HEPARIN 100 UNIT/ML
500 SYRINGE INTRAVENOUS
Status: CANCELLED | OUTPATIENT
Start: 2019-12-20

## 2019-12-20 RX ORDER — DIPHENHYDRAMINE HYDROCHLORIDE 50 MG/ML
50 INJECTION INTRAMUSCULAR; INTRAVENOUS
Status: CANCELLED | OUTPATIENT
Start: 2019-12-20

## 2020-01-06 ENCOUNTER — LAB VISIT (OUTPATIENT)
Dept: LAB | Facility: HOSPITAL | Age: 45
End: 2020-01-06
Attending: CLINICAL NURSE SPECIALIST
Payer: COMMERCIAL

## 2020-01-06 DIAGNOSIS — G35 MULTIPLE SCLEROSIS: ICD-10-CM

## 2020-01-06 LAB
BASOPHILS # BLD AUTO: 0.1 K/UL (ref 0–0.2)
BASOPHILS NFR BLD: 0.7 % (ref 0–1.9)
DIFFERENTIAL METHOD: ABNORMAL
EOSINOPHIL # BLD AUTO: 0.3 K/UL (ref 0–0.5)
EOSINOPHIL NFR BLD: 1.9 % (ref 0–8)
ERYTHROCYTE [DISTWIDTH] IN BLOOD BY AUTOMATED COUNT: 17.2 % (ref 11.5–14.5)
HCT VFR BLD AUTO: 39.4 % (ref 37–48.5)
HGB BLD-MCNC: 12.4 G/DL (ref 12–16)
IMM GRANULOCYTES # BLD AUTO: 0.07 K/UL (ref 0–0.04)
IMM GRANULOCYTES NFR BLD AUTO: 0.5 % (ref 0–0.5)
LYMPHOCYTES # BLD AUTO: 2.5 K/UL (ref 1–4.8)
LYMPHOCYTES NFR BLD: 16.7 % (ref 18–48)
MCH RBC QN AUTO: 22.8 PG (ref 27–31)
MCHC RBC AUTO-ENTMCNC: 31.5 G/DL (ref 32–36)
MCV RBC AUTO: 72 FL (ref 82–98)
MONOCYTES # BLD AUTO: 0.9 K/UL (ref 0.3–1)
MONOCYTES NFR BLD: 5.8 % (ref 4–15)
NEUTROPHILS # BLD AUTO: 11.2 K/UL (ref 1.8–7.7)
NEUTROPHILS NFR BLD: 74.4 % (ref 38–73)
NRBC BLD-RTO: 0 /100 WBC
PLATELET # BLD AUTO: 364 K/UL (ref 150–350)
PMV BLD AUTO: 10.3 FL (ref 9.2–12.9)
RBC # BLD AUTO: 5.45 M/UL (ref 4–5.4)
WBC # BLD AUTO: 15 K/UL (ref 3.9–12.7)

## 2020-01-06 PROCEDURE — 86360 T CELL ABSOLUTE COUNT/RATIO: CPT | Mod: PO

## 2020-01-06 PROCEDURE — 36415 COLL VENOUS BLD VENIPUNCTURE: CPT | Mod: PO

## 2020-01-06 PROCEDURE — 86359 T CELLS TOTAL COUNT: CPT | Mod: PO

## 2020-01-06 PROCEDURE — 85025 COMPLETE CBC W/AUTO DIFF WBC: CPT | Mod: PO

## 2020-01-07 LAB
ABSOLUTE CD3: 1843 CELLS/UL (ref 700–2100)
ABSOLUTE CD8: 393 CELLS/UL (ref 200–900)
CD3%: 70.4 % (ref 55–83)
CD3+CD4+ CELLS # BLD: 1453 CELLS/UL (ref 300–1400)
CD3+CD4+ CELLS NFR BLD: 55.5 % (ref 28–57)
CD4/CD8 RATIO: 3.7 (ref 0.9–3.6)
CD8 % SUPPRESSOR T CELL: 15 % (ref 10–39)

## 2020-01-08 ENCOUNTER — PATIENT MESSAGE (OUTPATIENT)
Dept: NEUROLOGY | Facility: CLINIC | Age: 45
End: 2020-01-08

## 2020-01-08 DIAGNOSIS — R79.89 ABNORMAL CBC: Primary | ICD-10-CM

## 2020-01-09 ENCOUNTER — LAB VISIT (OUTPATIENT)
Dept: LAB | Facility: HOSPITAL | Age: 45
End: 2020-01-09
Attending: CLINICAL NURSE SPECIALIST
Payer: COMMERCIAL

## 2020-01-09 ENCOUNTER — PATIENT MESSAGE (OUTPATIENT)
Dept: NEUROLOGY | Facility: CLINIC | Age: 45
End: 2020-01-09

## 2020-01-09 DIAGNOSIS — R79.89 ABNORMAL CBC: ICD-10-CM

## 2020-01-09 LAB
ALBUMIN SERPL BCP-MCNC: 4 G/DL (ref 3.5–5.2)
ALP SERPL-CCNC: 60 U/L (ref 38–126)
ALT SERPL W/O P-5'-P-CCNC: 13 U/L (ref 10–44)
ANION GAP SERPL CALC-SCNC: 6 MMOL/L (ref 8–16)
AST SERPL-CCNC: 19 U/L (ref 15–46)
BASOPHILS # BLD AUTO: 0.09 K/UL (ref 0–0.2)
BASOPHILS NFR BLD: 0.6 % (ref 0–1.9)
BILIRUB SERPL-MCNC: 0.3 MG/DL (ref 0.1–1)
BUN SERPL-MCNC: 11 MG/DL (ref 7–17)
CALCIUM SERPL-MCNC: 9.2 MG/DL (ref 8.7–10.5)
CHLORIDE SERPL-SCNC: 107 MMOL/L (ref 95–110)
CO2 SERPL-SCNC: 27 MMOL/L (ref 23–29)
CREAT SERPL-MCNC: 0.62 MG/DL (ref 0.5–1.4)
DIFFERENTIAL METHOD: ABNORMAL
EOSINOPHIL # BLD AUTO: 0.3 K/UL (ref 0–0.5)
EOSINOPHIL NFR BLD: 1.7 % (ref 0–8)
ERYTHROCYTE [DISTWIDTH] IN BLOOD BY AUTOMATED COUNT: 16.9 % (ref 11.5–14.5)
EST. GFR  (AFRICAN AMERICAN): >60 ML/MIN/1.73 M^2
EST. GFR  (NON AFRICAN AMERICAN): >60 ML/MIN/1.73 M^2
GLUCOSE SERPL-MCNC: 79 MG/DL (ref 70–110)
HCT VFR BLD AUTO: 37.4 % (ref 37–48.5)
HGB BLD-MCNC: 11.8 G/DL (ref 12–16)
IMM GRANULOCYTES # BLD AUTO: 0.06 K/UL (ref 0–0.04)
IMM GRANULOCYTES NFR BLD AUTO: 0.4 % (ref 0–0.5)
LYMPHOCYTES # BLD AUTO: 3.1 K/UL (ref 1–4.8)
LYMPHOCYTES NFR BLD: 21.5 % (ref 18–48)
MCH RBC QN AUTO: 23 PG (ref 27–31)
MCHC RBC AUTO-ENTMCNC: 31.6 G/DL (ref 32–36)
MCV RBC AUTO: 73 FL (ref 82–98)
MONOCYTES # BLD AUTO: 0.9 K/UL (ref 0.3–1)
MONOCYTES NFR BLD: 6.2 % (ref 4–15)
NEUTROPHILS # BLD AUTO: 10.1 K/UL (ref 1.8–7.7)
NEUTROPHILS NFR BLD: 69.6 % (ref 38–73)
NRBC BLD-RTO: 0 /100 WBC
PLATELET # BLD AUTO: 339 K/UL (ref 150–350)
PMV BLD AUTO: 9.8 FL (ref 9.2–12.9)
POTASSIUM SERPL-SCNC: 4.2 MMOL/L (ref 3.5–5.1)
PROT SERPL-MCNC: 7.3 G/DL (ref 6–8.4)
RBC # BLD AUTO: 5.14 M/UL (ref 4–5.4)
SODIUM SERPL-SCNC: 140 MMOL/L (ref 136–145)
WBC # BLD AUTO: 14.44 K/UL (ref 3.9–12.7)

## 2020-01-09 PROCEDURE — 85025 COMPLETE CBC W/AUTO DIFF WBC: CPT | Mod: PO

## 2020-01-09 PROCEDURE — 80053 COMPREHEN METABOLIC PANEL: CPT | Mod: PO

## 2020-01-09 PROCEDURE — 36415 COLL VENOUS BLD VENIPUNCTURE: CPT | Mod: PO

## 2020-01-10 ENCOUNTER — PATIENT MESSAGE (OUTPATIENT)
Dept: NEUROLOGY | Facility: CLINIC | Age: 45
End: 2020-01-10

## 2020-01-10 DIAGNOSIS — G35 MULTIPLE SCLEROSIS: Primary | ICD-10-CM

## 2020-01-13 ENCOUNTER — LAB VISIT (OUTPATIENT)
Dept: LAB | Facility: HOSPITAL | Age: 45
End: 2020-01-13
Attending: PSYCHIATRY & NEUROLOGY
Payer: COMMERCIAL

## 2020-01-13 DIAGNOSIS — G35 MULTIPLE SCLEROSIS: ICD-10-CM

## 2020-01-13 LAB
BASOPHILS # BLD AUTO: 0.09 K/UL (ref 0–0.2)
BASOPHILS NFR BLD: 0.6 % (ref 0–1.9)
DIFFERENTIAL METHOD: ABNORMAL
EOSINOPHIL # BLD AUTO: 0.3 K/UL (ref 0–0.5)
EOSINOPHIL NFR BLD: 1.7 % (ref 0–8)
ERYTHROCYTE [DISTWIDTH] IN BLOOD BY AUTOMATED COUNT: 17.9 % (ref 11.5–14.5)
HCT VFR BLD AUTO: 42 % (ref 37–48.5)
HGB BLD-MCNC: 12.8 G/DL (ref 12–16)
IMM GRANULOCYTES # BLD AUTO: 0.06 K/UL (ref 0–0.04)
IMM GRANULOCYTES NFR BLD AUTO: 0.4 % (ref 0–0.5)
LYMPHOCYTES # BLD AUTO: 2.8 K/UL (ref 1–4.8)
LYMPHOCYTES NFR BLD: 18.8 % (ref 18–48)
MCH RBC QN AUTO: 22.9 PG (ref 27–31)
MCHC RBC AUTO-ENTMCNC: 30.5 G/DL (ref 32–36)
MCV RBC AUTO: 75 FL (ref 82–98)
MONOCYTES # BLD AUTO: 0.7 K/UL (ref 0.3–1)
MONOCYTES NFR BLD: 5 % (ref 4–15)
NEUTROPHILS # BLD AUTO: 10.8 K/UL (ref 1.8–7.7)
NEUTROPHILS NFR BLD: 73.5 % (ref 38–73)
NRBC BLD-RTO: 0 /100 WBC
PLATELET # BLD AUTO: 378 K/UL (ref 150–350)
PMV BLD AUTO: 10.9 FL (ref 9.2–12.9)
RBC # BLD AUTO: 5.59 M/UL (ref 4–5.4)
WBC # BLD AUTO: 14.67 K/UL (ref 3.9–12.7)

## 2020-01-13 PROCEDURE — 36415 COLL VENOUS BLD VENIPUNCTURE: CPT | Mod: PO

## 2020-01-13 PROCEDURE — 85025 COMPLETE CBC W/AUTO DIFF WBC: CPT

## 2020-01-14 ENCOUNTER — PATIENT MESSAGE (OUTPATIENT)
Dept: NEUROLOGY | Facility: CLINIC | Age: 45
End: 2020-01-14

## 2020-01-15 ENCOUNTER — INFUSION (OUTPATIENT)
Dept: INFUSION THERAPY | Facility: HOSPITAL | Age: 45
End: 2020-01-15
Attending: PSYCHIATRY & NEUROLOGY
Payer: COMMERCIAL

## 2020-01-15 VITALS
TEMPERATURE: 99 F | SYSTOLIC BLOOD PRESSURE: 120 MMHG | RESPIRATION RATE: 16 BRPM | DIASTOLIC BLOOD PRESSURE: 71 MMHG | HEART RATE: 108 BPM

## 2020-01-15 DIAGNOSIS — G35 MS (MULTIPLE SCLEROSIS): Primary | ICD-10-CM

## 2020-01-15 PROCEDURE — 96366 THER/PROPH/DIAG IV INF ADDON: CPT

## 2020-01-15 PROCEDURE — 63600175 PHARM REV CODE 636 W HCPCS: Performed by: PSYCHIATRY & NEUROLOGY

## 2020-01-15 PROCEDURE — 96375 TX/PRO/DX INJ NEW DRUG ADDON: CPT

## 2020-01-15 PROCEDURE — 96365 THER/PROPH/DIAG IV INF INIT: CPT

## 2020-01-15 PROCEDURE — 96367 TX/PROPH/DG ADDL SEQ IV INF: CPT

## 2020-01-15 PROCEDURE — S0028 INJECTION, FAMOTIDINE, 20 MG: HCPCS | Performed by: PSYCHIATRY & NEUROLOGY

## 2020-01-15 PROCEDURE — 25000003 PHARM REV CODE 250: Performed by: PSYCHIATRY & NEUROLOGY

## 2020-01-15 RX ORDER — ACETAMINOPHEN 500 MG
1000 TABLET ORAL
Status: CANCELLED | OUTPATIENT
Start: 2020-01-29

## 2020-01-15 RX ORDER — SODIUM CHLORIDE 0.9 % (FLUSH) 0.9 %
10 SYRINGE (ML) INJECTION
Status: CANCELLED | OUTPATIENT
Start: 2020-01-29

## 2020-01-15 RX ORDER — HEPARIN 100 UNIT/ML
500 SYRINGE INTRAVENOUS
Status: DISCONTINUED | OUTPATIENT
Start: 2020-01-15 | End: 2020-01-15 | Stop reason: HOSPADM

## 2020-01-15 RX ORDER — HEPARIN 100 UNIT/ML
500 SYRINGE INTRAVENOUS
Status: CANCELLED | OUTPATIENT
Start: 2020-01-29

## 2020-01-15 RX ORDER — DIPHENHYDRAMINE HYDROCHLORIDE 50 MG/ML
50 INJECTION INTRAMUSCULAR; INTRAVENOUS
Status: CANCELLED | OUTPATIENT
Start: 2020-01-29

## 2020-01-15 RX ORDER — DIPHENHYDRAMINE HYDROCHLORIDE 50 MG/ML
50 INJECTION INTRAMUSCULAR; INTRAVENOUS
Status: DISCONTINUED | OUTPATIENT
Start: 2020-01-15 | End: 2020-01-15 | Stop reason: HOSPADM

## 2020-01-15 RX ORDER — FAMOTIDINE 10 MG/ML
20 INJECTION INTRAVENOUS
Status: CANCELLED | OUTPATIENT
Start: 2020-01-29

## 2020-01-15 RX ORDER — FAMOTIDINE 10 MG/ML
20 INJECTION INTRAVENOUS
Status: COMPLETED | OUTPATIENT
Start: 2020-01-15 | End: 2020-01-15

## 2020-01-15 RX ORDER — EPINEPHRINE 0.3 MG/.3ML
0.3 INJECTION SUBCUTANEOUS
Status: CANCELLED | OUTPATIENT
Start: 2020-01-29

## 2020-01-15 RX ORDER — SODIUM CHLORIDE 0.9 % (FLUSH) 0.9 %
10 SYRINGE (ML) INJECTION
Status: DISCONTINUED | OUTPATIENT
Start: 2020-01-15 | End: 2020-01-15 | Stop reason: HOSPADM

## 2020-01-15 RX ORDER — ACETAMINOPHEN 500 MG
1000 TABLET ORAL
Status: COMPLETED | OUTPATIENT
Start: 2020-01-15 | End: 2020-01-15

## 2020-01-15 RX ORDER — EPINEPHRINE 0.3 MG/.3ML
0.3 INJECTION SUBCUTANEOUS
Status: DISCONTINUED | OUTPATIENT
Start: 2020-01-15 | End: 2020-01-15 | Stop reason: HOSPADM

## 2020-01-15 RX ADMIN — DIPHENHYDRAMINE HYDROCHLORIDE 50 MG: 50 INJECTION, SOLUTION INTRAMUSCULAR; INTRAVENOUS at 08:01

## 2020-01-15 RX ADMIN — SODIUM CHLORIDE: 0.9 INJECTION, SOLUTION INTRAVENOUS at 08:01

## 2020-01-15 RX ADMIN — FAMOTIDINE 20 MG: 10 INJECTION, SOLUTION INTRAVENOUS at 08:01

## 2020-01-15 RX ADMIN — DEXTROSE: 50 INJECTION, SOLUTION INTRAVENOUS at 08:01

## 2020-01-15 RX ADMIN — ACETAMINOPHEN 1000 MG: 500 TABLET, FILM COATED ORAL at 08:01

## 2020-01-15 RX ADMIN — OCRELIZUMAB 300 MG: 300 INJECTION INTRAVENOUS at 09:01

## 2020-01-15 NOTE — PLAN OF CARE
Pt tolerated Ocrevus with no complications. VSS. Pt instructed to call MD with any problems. NAD. Pt discharged home independently.

## 2020-01-20 ENCOUNTER — PATIENT MESSAGE (OUTPATIENT)
Dept: NEUROLOGY | Facility: CLINIC | Age: 45
End: 2020-01-20

## 2020-01-22 DIAGNOSIS — R26.9 GAIT DISTURBANCE: ICD-10-CM

## 2020-01-22 DIAGNOSIS — G35 MULTIPLE SCLEROSIS: ICD-10-CM

## 2020-01-22 RX ORDER — DALFAMPRIDINE 10 MG/1
1 TABLET, FILM COATED, EXTENDED RELEASE ORAL EVERY 12 HOURS
Qty: 60 TABLET | Refills: 4 | Status: SHIPPED | OUTPATIENT
Start: 2020-01-22 | End: 2021-04-21

## 2020-01-27 ENCOUNTER — TELEPHONE (OUTPATIENT)
Dept: PHARMACY | Facility: CLINIC | Age: 45
End: 2020-01-27

## 2020-01-27 NOTE — TELEPHONE ENCOUNTER
Informed Patient  that [facility] received prescription for Dalfampridine. She said her copay is high at her other pharmacy and asked that we look into assistance for her.

## 2020-01-29 ENCOUNTER — INFUSION (OUTPATIENT)
Dept: INFUSION THERAPY | Facility: HOSPITAL | Age: 45
End: 2020-01-29
Attending: PSYCHIATRY & NEUROLOGY
Payer: COMMERCIAL

## 2020-01-29 VITALS
RESPIRATION RATE: 18 BRPM | BODY MASS INDEX: 38.91 KG/M2 | TEMPERATURE: 99 F | HEART RATE: 112 BPM | WEIGHT: 227.94 LBS | DIASTOLIC BLOOD PRESSURE: 69 MMHG | HEIGHT: 64 IN | SYSTOLIC BLOOD PRESSURE: 113 MMHG

## 2020-01-29 DIAGNOSIS — G35 MS (MULTIPLE SCLEROSIS): Primary | ICD-10-CM

## 2020-01-29 PROCEDURE — 25000003 PHARM REV CODE 250: Performed by: PSYCHIATRY & NEUROLOGY

## 2020-01-29 PROCEDURE — 96367 TX/PROPH/DG ADDL SEQ IV INF: CPT

## 2020-01-29 PROCEDURE — 96376 TX/PRO/DX INJ SAME DRUG ADON: CPT

## 2020-01-29 PROCEDURE — S0028 INJECTION, FAMOTIDINE, 20 MG: HCPCS | Performed by: PSYCHIATRY & NEUROLOGY

## 2020-01-29 PROCEDURE — 96366 THER/PROPH/DIAG IV INF ADDON: CPT

## 2020-01-29 PROCEDURE — 96365 THER/PROPH/DIAG IV INF INIT: CPT

## 2020-01-29 PROCEDURE — 63600175 PHARM REV CODE 636 W HCPCS: Performed by: PSYCHIATRY & NEUROLOGY

## 2020-01-29 RX ORDER — EPINEPHRINE 0.3 MG/.3ML
0.3 INJECTION SUBCUTANEOUS
Status: CANCELLED | OUTPATIENT
Start: 2020-07-01

## 2020-01-29 RX ORDER — DIPHENHYDRAMINE HYDROCHLORIDE 50 MG/ML
50 INJECTION INTRAMUSCULAR; INTRAVENOUS
Status: CANCELLED | OUTPATIENT
Start: 2020-07-01

## 2020-01-29 RX ORDER — EPINEPHRINE 0.3 MG/.3ML
0.3 INJECTION SUBCUTANEOUS
Status: DISCONTINUED | OUTPATIENT
Start: 2020-01-29 | End: 2020-01-29 | Stop reason: HOSPADM

## 2020-01-29 RX ORDER — FAMOTIDINE 10 MG/ML
20 INJECTION INTRAVENOUS
Status: COMPLETED | OUTPATIENT
Start: 2020-01-29 | End: 2020-01-29

## 2020-01-29 RX ORDER — HEPARIN 100 UNIT/ML
500 SYRINGE INTRAVENOUS
Status: DISCONTINUED | OUTPATIENT
Start: 2020-01-29 | End: 2020-01-29 | Stop reason: HOSPADM

## 2020-01-29 RX ORDER — ACETAMINOPHEN 500 MG
1000 TABLET ORAL
Status: COMPLETED | OUTPATIENT
Start: 2020-01-29 | End: 2020-01-29

## 2020-01-29 RX ORDER — ACETAMINOPHEN 500 MG
1000 TABLET ORAL
Status: CANCELLED | OUTPATIENT
Start: 2020-07-01

## 2020-01-29 RX ORDER — SODIUM CHLORIDE 0.9 % (FLUSH) 0.9 %
10 SYRINGE (ML) INJECTION
Status: CANCELLED | OUTPATIENT
Start: 2020-07-01

## 2020-01-29 RX ORDER — DIPHENHYDRAMINE HYDROCHLORIDE 50 MG/ML
50 INJECTION INTRAMUSCULAR; INTRAVENOUS
Status: DISCONTINUED | OUTPATIENT
Start: 2020-01-29 | End: 2020-01-29 | Stop reason: HOSPADM

## 2020-01-29 RX ORDER — SODIUM CHLORIDE 0.9 % (FLUSH) 0.9 %
10 SYRINGE (ML) INJECTION
Status: DISCONTINUED | OUTPATIENT
Start: 2020-01-29 | End: 2020-01-29 | Stop reason: HOSPADM

## 2020-01-29 RX ORDER — FAMOTIDINE 10 MG/ML
20 INJECTION INTRAVENOUS
Status: CANCELLED | OUTPATIENT
Start: 2020-07-01

## 2020-01-29 RX ORDER — HEPARIN 100 UNIT/ML
500 SYRINGE INTRAVENOUS
Status: CANCELLED | OUTPATIENT
Start: 2020-07-01

## 2020-01-29 RX ADMIN — ACETAMINOPHEN 1000 MG: 500 TABLET, FILM COATED ORAL at 09:01

## 2020-01-29 RX ADMIN — DEXTROSE: 50 INJECTION, SOLUTION INTRAVENOUS at 09:01

## 2020-01-29 RX ADMIN — FAMOTIDINE 20 MG: 10 INJECTION, SOLUTION INTRAVENOUS at 09:01

## 2020-01-29 RX ADMIN — OCRELIZUMAB 300 MG: 300 INJECTION INTRAVENOUS at 09:01

## 2020-01-29 RX ADMIN — SODIUM CHLORIDE: 9 INJECTION, SOLUTION INTRAVENOUS at 09:01

## 2020-01-29 RX ADMIN — DIPHENHYDRAMINE HYDROCHLORIDE 50 MG: 50 INJECTION INTRAMUSCULAR; INTRAVENOUS at 09:01

## 2020-01-29 NOTE — PLAN OF CARE
Tolerated ocrevus well.  Observed pt 1 hr post infusion, no reactions noted.  No questions or concerns at this time.  Ambulated off unit in NAD.

## 2020-01-30 NOTE — TELEPHONE ENCOUNTER
Rx has been investigated by [facility] Financial Assistance team. Unfortunately there are no other assistance options available for the patient. Leti vasquez.

## 2020-06-17 ENCOUNTER — LAB VISIT (OUTPATIENT)
Dept: LAB | Facility: HOSPITAL | Age: 45
End: 2020-06-17
Attending: CLINICAL NURSE SPECIALIST
Payer: COMMERCIAL

## 2020-06-17 DIAGNOSIS — G35 MULTIPLE SCLEROSIS: ICD-10-CM

## 2020-06-17 LAB
BASOPHILS # BLD AUTO: 0.06 K/UL (ref 0–0.2)
BASOPHILS NFR BLD: 0.4 % (ref 0–1.9)
DIFFERENTIAL METHOD: ABNORMAL
EOSINOPHIL # BLD AUTO: 0.2 K/UL (ref 0–0.5)
EOSINOPHIL NFR BLD: 1.7 % (ref 0–8)
ERYTHROCYTE [DISTWIDTH] IN BLOOD BY AUTOMATED COUNT: 17.4 % (ref 11.5–14.5)
HCT VFR BLD AUTO: 37 % (ref 37–48.5)
HGB BLD-MCNC: 11.6 G/DL (ref 12–16)
IMM GRANULOCYTES # BLD AUTO: 0.09 K/UL (ref 0–0.04)
IMM GRANULOCYTES NFR BLD AUTO: 0.7 % (ref 0–0.5)
LYMPHOCYTES # BLD AUTO: 2 K/UL (ref 1–4.8)
LYMPHOCYTES NFR BLD: 14.1 % (ref 18–48)
MCH RBC QN AUTO: 22.8 PG (ref 27–31)
MCHC RBC AUTO-ENTMCNC: 31.4 G/DL (ref 32–36)
MCV RBC AUTO: 73 FL (ref 82–98)
MONOCYTES # BLD AUTO: 0.9 K/UL (ref 0.3–1)
MONOCYTES NFR BLD: 6.3 % (ref 4–15)
NEUTROPHILS # BLD AUTO: 10.6 K/UL (ref 1.8–7.7)
NEUTROPHILS NFR BLD: 76.8 % (ref 38–73)
NRBC BLD-RTO: 0 /100 WBC
PLATELET # BLD AUTO: 345 K/UL (ref 150–350)
PMV BLD AUTO: 9.9 FL (ref 9.2–12.9)
RBC # BLD AUTO: 5.09 M/UL (ref 4–5.4)
WBC # BLD AUTO: 13.83 K/UL (ref 3.9–12.7)

## 2020-06-17 PROCEDURE — 85025 COMPLETE CBC W/AUTO DIFF WBC: CPT | Mod: PO

## 2020-06-17 PROCEDURE — 86706 HEP B SURFACE ANTIBODY: CPT | Mod: PO

## 2020-06-17 PROCEDURE — 86704 HEP B CORE ANTIBODY TOTAL: CPT | Mod: PO

## 2020-06-17 PROCEDURE — 36415 COLL VENOUS BLD VENIPUNCTURE: CPT | Mod: PO

## 2020-06-17 PROCEDURE — 86356 MONONUCLEAR CELL ANTIGEN: CPT | Mod: PO

## 2020-06-17 PROCEDURE — 87340 HEPATITIS B SURFACE AG IA: CPT | Mod: PO

## 2020-06-18 LAB
HBV CORE AB SERPL QL IA: NEGATIVE
HBV SURFACE AB SER-ACNC: POSITIVE M[IU]/ML
HBV SURFACE AG SERPL QL IA: NEGATIVE

## 2020-06-20 LAB
APPEARANCE SPEC: NORMAL
CD20 CELLS NFR SPEC: NORMAL %
SPECIMEN SOURCE: 0
VIABLE CELLS NFR SPEC: 99 %

## 2020-06-24 ENCOUNTER — HOSPITAL ENCOUNTER (OUTPATIENT)
Dept: RADIOLOGY | Facility: HOSPITAL | Age: 45
Discharge: HOME | End: 2020-06-24
Attending: CLINICAL NURSE SPECIALIST
Payer: COMMERCIAL

## 2020-06-24 DIAGNOSIS — G35 MULTIPLE SCLEROSIS: ICD-10-CM

## 2020-06-24 PROCEDURE — 70551 MRI BRAIN STEM W/O DYE: CPT | Mod: TC,PO

## 2020-06-24 RX ORDER — GADOBUTROL 604.72 MG/ML
10 INJECTION INTRAVENOUS
Status: DISCONTINUED | OUTPATIENT
Start: 2020-06-24 | End: 2020-06-25 | Stop reason: HOSPADM

## 2020-07-22 ENCOUNTER — INFUSION (OUTPATIENT)
Dept: INFUSION THERAPY | Facility: HOSPITAL | Age: 45
End: 2020-07-22
Payer: COMMERCIAL

## 2020-07-22 ENCOUNTER — LAB VISIT (OUTPATIENT)
Dept: SURGERY | Facility: CLINIC | Age: 45
End: 2020-07-22
Payer: COMMERCIAL

## 2020-07-22 VITALS
BODY MASS INDEX: 41.13 KG/M2 | RESPIRATION RATE: 18 BRPM | TEMPERATURE: 98 F | OXYGEN SATURATION: 99 % | HEART RATE: 89 BPM | WEIGHT: 240.94 LBS | DIASTOLIC BLOOD PRESSURE: 84 MMHG | SYSTOLIC BLOOD PRESSURE: 139 MMHG | HEIGHT: 64 IN

## 2020-07-22 DIAGNOSIS — Z13.9 SCREENING FOR CONDITION: ICD-10-CM

## 2020-07-22 DIAGNOSIS — G35 MS (MULTIPLE SCLEROSIS): Primary | ICD-10-CM

## 2020-07-22 LAB — SARS-COV-2 RNA RESP QL NAA+PROBE: NOT DETECTED

## 2020-07-22 PROCEDURE — 25000003 PHARM REV CODE 250: Performed by: PSYCHIATRY & NEUROLOGY

## 2020-07-22 PROCEDURE — U0003 INFECTIOUS AGENT DETECTION BY NUCLEIC ACID (DNA OR RNA); SEVERE ACUTE RESPIRATORY SYNDROME CORONAVIRUS 2 (SARS-COV-2) (CORONAVIRUS DISEASE [COVID-19]), AMPLIFIED PROBE TECHNIQUE, MAKING USE OF HIGH THROUGHPUT TECHNOLOGIES AS DESCRIBED BY CMS-2020-01-R: HCPCS

## 2020-07-22 PROCEDURE — 96375 TX/PRO/DX INJ NEW DRUG ADDON: CPT

## 2020-07-22 PROCEDURE — 63600175 PHARM REV CODE 636 W HCPCS: Performed by: PSYCHIATRY & NEUROLOGY

## 2020-07-22 PROCEDURE — S0028 INJECTION, FAMOTIDINE, 20 MG: HCPCS | Performed by: PSYCHIATRY & NEUROLOGY

## 2020-07-22 PROCEDURE — 96413 CHEMO IV INFUSION 1 HR: CPT

## 2020-07-22 PROCEDURE — 96415 CHEMO IV INFUSION ADDL HR: CPT

## 2020-07-22 PROCEDURE — 96367 TX/PROPH/DG ADDL SEQ IV INF: CPT

## 2020-07-22 RX ORDER — EPINEPHRINE 0.3 MG/.3ML
0.3 INJECTION SUBCUTANEOUS
Status: DISCONTINUED | OUTPATIENT
Start: 2020-07-22 | End: 2020-07-22 | Stop reason: HOSPADM

## 2020-07-22 RX ORDER — ACETAMINOPHEN 500 MG
1000 TABLET ORAL
Status: COMPLETED | OUTPATIENT
Start: 2020-07-22 | End: 2020-07-22

## 2020-07-22 RX ORDER — HEPARIN 100 UNIT/ML
500 SYRINGE INTRAVENOUS
Status: CANCELLED | OUTPATIENT
Start: 2020-12-16

## 2020-07-22 RX ORDER — SODIUM CHLORIDE 0.9 % (FLUSH) 0.9 %
10 SYRINGE (ML) INJECTION
Status: CANCELLED | OUTPATIENT
Start: 2020-12-16

## 2020-07-22 RX ORDER — FAMOTIDINE 10 MG/ML
20 INJECTION INTRAVENOUS
Status: COMPLETED | OUTPATIENT
Start: 2020-07-22 | End: 2020-07-22

## 2020-07-22 RX ORDER — ACETAMINOPHEN 500 MG
1000 TABLET ORAL
Status: CANCELLED | OUTPATIENT
Start: 2020-12-16

## 2020-07-22 RX ORDER — FAMOTIDINE 10 MG/ML
20 INJECTION INTRAVENOUS
Status: CANCELLED | OUTPATIENT
Start: 2020-12-16

## 2020-07-22 RX ORDER — DIPHENHYDRAMINE HYDROCHLORIDE 50 MG/ML
50 INJECTION INTRAMUSCULAR; INTRAVENOUS
Status: DISCONTINUED | OUTPATIENT
Start: 2020-07-22 | End: 2020-07-22 | Stop reason: HOSPADM

## 2020-07-22 RX ORDER — SODIUM CHLORIDE 0.9 % (FLUSH) 0.9 %
10 SYRINGE (ML) INJECTION
Status: DISCONTINUED | OUTPATIENT
Start: 2020-07-22 | End: 2020-07-22 | Stop reason: HOSPADM

## 2020-07-22 RX ORDER — EPINEPHRINE 0.3 MG/.3ML
0.3 INJECTION SUBCUTANEOUS
Status: CANCELLED | OUTPATIENT
Start: 2020-12-16

## 2020-07-22 RX ORDER — HEPARIN 100 UNIT/ML
500 SYRINGE INTRAVENOUS
Status: DISCONTINUED | OUTPATIENT
Start: 2020-07-22 | End: 2020-07-22 | Stop reason: HOSPADM

## 2020-07-22 RX ORDER — DIPHENHYDRAMINE HYDROCHLORIDE 50 MG/ML
50 INJECTION INTRAMUSCULAR; INTRAVENOUS
Status: CANCELLED | OUTPATIENT
Start: 2020-12-16

## 2020-07-22 RX ADMIN — SODIUM CHLORIDE: 0.9 INJECTION, SOLUTION INTRAVENOUS at 01:07

## 2020-07-22 RX ADMIN — DIPHENHYDRAMINE HYDROCHLORIDE 50 MG: 50 INJECTION, SOLUTION INTRAMUSCULAR; INTRAVENOUS at 12:07

## 2020-07-22 RX ADMIN — FAMOTIDINE 20 MG: 10 INJECTION INTRAVENOUS at 01:07

## 2020-07-22 RX ADMIN — OCRELIZUMAB 600 MG: 300 INJECTION INTRAVENOUS at 01:07

## 2020-07-22 RX ADMIN — ACETAMINOPHEN 1000 MG: 500 TABLET, FILM COATED ORAL at 12:07

## 2020-07-22 RX ADMIN — DEXTROSE: 50 INJECTION, SOLUTION INTRAVENOUS at 01:07

## 2020-07-22 NOTE — PLAN OF CARE
Pt received Ocrevus today and tolerated well, without complications. VSS throughout infusion. Educated patient about Ocrevus (indications, side effects, possible reactions, precautions) and verbalized understanding. PIV positive for blood return, saline locked and removed prior to DC, catheter tip intact. Pt DC with no distress noted, ambulated off of unit w/o event or reaction, pleased. Pt did opt out of waiting the 1 hour obs.

## 2020-11-02 ENCOUNTER — PATIENT MESSAGE (OUTPATIENT)
Dept: PSYCHIATRY | Facility: CLINIC | Age: 45
End: 2020-11-02

## 2020-12-22 ENCOUNTER — TELEPHONE (OUTPATIENT)
Dept: NEUROLOGY | Facility: CLINIC | Age: 45
End: 2020-12-22

## 2020-12-22 DIAGNOSIS — G35 MULTIPLE SCLEROSIS: Primary | ICD-10-CM

## 2021-01-05 ENCOUNTER — PATIENT MESSAGE (OUTPATIENT)
Dept: NEUROLOGY | Facility: CLINIC | Age: 46
End: 2021-01-05

## 2021-01-05 DIAGNOSIS — G35 MULTIPLE SCLEROSIS: Primary | ICD-10-CM

## 2021-01-06 ENCOUNTER — LAB VISIT (OUTPATIENT)
Dept: LAB | Facility: HOSPITAL | Age: 46
End: 2021-01-06
Attending: CLINICAL NURSE SPECIALIST
Payer: COMMERCIAL

## 2021-01-06 DIAGNOSIS — G35 MULTIPLE SCLEROSIS: ICD-10-CM

## 2021-01-06 LAB
BASOPHILS # BLD AUTO: 0.06 K/UL (ref 0–0.2)
BASOPHILS NFR BLD: 0.4 % (ref 0–1.9)
DIFFERENTIAL METHOD: ABNORMAL
EOSINOPHIL # BLD AUTO: 0.1 K/UL (ref 0–0.5)
EOSINOPHIL NFR BLD: 0.8 % (ref 0–8)
ERYTHROCYTE [DISTWIDTH] IN BLOOD BY AUTOMATED COUNT: 17.2 % (ref 11.5–14.5)
HCT VFR BLD AUTO: 37.1 % (ref 37–48.5)
HGB BLD-MCNC: 11.8 G/DL (ref 12–16)
IMM GRANULOCYTES # BLD AUTO: 0.07 K/UL (ref 0–0.04)
IMM GRANULOCYTES NFR BLD AUTO: 0.5 % (ref 0–0.5)
LYMPHOCYTES # BLD AUTO: 2.5 K/UL (ref 1–4.8)
LYMPHOCYTES NFR BLD: 18.1 % (ref 18–48)
MCH RBC QN AUTO: 22.6 PG (ref 27–31)
MCHC RBC AUTO-ENTMCNC: 31.8 G/DL (ref 32–36)
MCV RBC AUTO: 71 FL (ref 82–98)
MONOCYTES # BLD AUTO: 0.5 K/UL (ref 0.3–1)
MONOCYTES NFR BLD: 3.8 % (ref 4–15)
NEUTROPHILS # BLD AUTO: 10.5 K/UL (ref 1.8–7.7)
NEUTROPHILS NFR BLD: 76.4 % (ref 38–73)
NRBC BLD-RTO: 0 /100 WBC
PLATELET # BLD AUTO: 356 K/UL (ref 150–350)
PMV BLD AUTO: 9.9 FL (ref 9.2–12.9)
RBC # BLD AUTO: 5.22 M/UL (ref 4–5.4)
WBC # BLD AUTO: 13.73 K/UL (ref 3.9–12.7)

## 2021-01-06 PROCEDURE — 87340 HEPATITIS B SURFACE AG IA: CPT | Mod: PO

## 2021-01-06 PROCEDURE — 85025 COMPLETE CBC W/AUTO DIFF WBC: CPT | Mod: PO

## 2021-01-06 PROCEDURE — 86706 HEP B SURFACE ANTIBODY: CPT | Mod: PO

## 2021-01-06 PROCEDURE — 86356 MONONUCLEAR CELL ANTIGEN: CPT | Mod: PO

## 2021-01-06 PROCEDURE — 86704 HEP B CORE ANTIBODY TOTAL: CPT | Mod: PO

## 2021-01-09 LAB
APPEARANCE SPEC: NORMAL
CD20 CELLS NFR SPEC: NORMAL %
SPECIMEN SOURCE: 0
VIABLE CELLS NFR SPEC: 98 %

## 2021-01-13 ENCOUNTER — OFFICE VISIT (OUTPATIENT)
Dept: NEUROLOGY | Facility: CLINIC | Age: 46
End: 2021-01-13
Payer: COMMERCIAL

## 2021-01-13 DIAGNOSIS — N31.9 NEUROGENIC DYSFUNCTION OF THE URINARY BLADDER: ICD-10-CM

## 2021-01-13 DIAGNOSIS — R53.83 FATIGUE, UNSPECIFIED TYPE: ICD-10-CM

## 2021-01-13 DIAGNOSIS — Z79.899 HIGH RISK MEDICATION USE: ICD-10-CM

## 2021-01-13 DIAGNOSIS — G35 MULTIPLE SCLEROSIS: Primary | ICD-10-CM

## 2021-01-13 DIAGNOSIS — R51.9 CHRONIC NONINTRACTABLE HEADACHE, UNSPECIFIED HEADACHE TYPE: ICD-10-CM

## 2021-01-13 DIAGNOSIS — R26.9 GAIT DISTURBANCE: ICD-10-CM

## 2021-01-13 DIAGNOSIS — G89.29 CHRONIC NONINTRACTABLE HEADACHE, UNSPECIFIED HEADACHE TYPE: ICD-10-CM

## 2021-01-13 DIAGNOSIS — Z71.89 COUNSELING REGARDING GOALS OF CARE: ICD-10-CM

## 2021-01-13 DIAGNOSIS — Z29.89 PROPHYLACTIC IMMUNOTHERAPY: ICD-10-CM

## 2021-01-13 PROCEDURE — 99215 OFFICE O/P EST HI 40 MIN: CPT | Mod: 95,,, | Performed by: CLINICAL NURSE SPECIALIST

## 2021-01-13 PROCEDURE — 99215 PR OFFICE/OUTPT VISIT, EST, LEVL V, 40-54 MIN: ICD-10-PCS | Mod: 95,,, | Performed by: CLINICAL NURSE SPECIALIST

## 2021-01-15 RX ORDER — MODAFINIL 100 MG/1
TABLET ORAL
Qty: 60 TABLET | Refills: 2 | Status: SHIPPED | OUTPATIENT
Start: 2021-01-15 | End: 2021-04-21

## 2021-01-19 ENCOUNTER — TELEPHONE (OUTPATIENT)
Dept: NEUROLOGY | Facility: CLINIC | Age: 46
End: 2021-01-19

## 2021-01-20 ENCOUNTER — TELEPHONE (OUTPATIENT)
Dept: NEUROLOGY | Facility: CLINIC | Age: 46
End: 2021-01-20

## 2021-01-20 ENCOUNTER — DOCUMENTATION ONLY (OUTPATIENT)
Dept: NEUROLOGY | Facility: CLINIC | Age: 46
End: 2021-01-20

## 2021-01-20 DIAGNOSIS — G35 MULTIPLE SCLEROSIS: Primary | ICD-10-CM

## 2021-01-20 NOTE — TELEPHONE ENCOUNTER
----- Message from Daisy Moura, APRN, CNS sent at 1/15/2021  3:47 PM CST -----  Patient can be scheduled for Ocrevus. She is available on Wednesdays. CD19,20=0Hep B Core Ab negative, Surface Ab positive (past vaccination), Surface Ag negative; WBC=13.73; PMN=2599HSH is chronically elevated.

## 2021-01-20 NOTE — TELEPHONE ENCOUNTER
Spoke with patient and scheduled her Ocrevus Infusion for 1/27/21 @ 8:00 am and her COVID Testing 1/24/21 @ 12:45 pm Erick NICKY.

## 2021-01-22 ENCOUNTER — PATIENT MESSAGE (OUTPATIENT)
Dept: NEUROLOGY | Facility: CLINIC | Age: 46
End: 2021-01-22

## 2021-01-24 ENCOUNTER — PATIENT MESSAGE (OUTPATIENT)
Dept: NEUROLOGY | Facility: CLINIC | Age: 46
End: 2021-01-24

## 2021-01-25 ENCOUNTER — PATIENT MESSAGE (OUTPATIENT)
Dept: NEUROLOGY | Facility: CLINIC | Age: 46
End: 2021-01-25

## 2021-01-28 ENCOUNTER — PATIENT MESSAGE (OUTPATIENT)
Dept: NEUROLOGY | Facility: CLINIC | Age: 46
End: 2021-01-28

## 2021-01-29 ENCOUNTER — PATIENT MESSAGE (OUTPATIENT)
Dept: NEUROLOGY | Facility: CLINIC | Age: 46
End: 2021-01-29

## 2021-01-31 ENCOUNTER — LAB VISIT (OUTPATIENT)
Dept: URGENT CARE | Facility: CLINIC | Age: 46
End: 2021-01-31
Payer: COMMERCIAL

## 2021-01-31 DIAGNOSIS — G35 MULTIPLE SCLEROSIS: ICD-10-CM

## 2021-01-31 PROCEDURE — U0003 INFECTIOUS AGENT DETECTION BY NUCLEIC ACID (DNA OR RNA); SEVERE ACUTE RESPIRATORY SYNDROME CORONAVIRUS 2 (SARS-COV-2) (CORONAVIRUS DISEASE [COVID-19]), AMPLIFIED PROBE TECHNIQUE, MAKING USE OF HIGH THROUGHPUT TECHNOLOGIES AS DESCRIBED BY CMS-2020-01-R: HCPCS

## 2021-02-01 LAB — SARS-COV-2 RNA RESP QL NAA+PROBE: NOT DETECTED

## 2021-02-02 ENCOUNTER — PATIENT MESSAGE (OUTPATIENT)
Dept: NEUROLOGY | Facility: CLINIC | Age: 46
End: 2021-02-02

## 2021-02-05 ENCOUNTER — INFUSION (OUTPATIENT)
Dept: INFUSION THERAPY | Facility: HOSPITAL | Age: 46
End: 2021-02-05
Attending: PSYCHIATRY & NEUROLOGY
Payer: COMMERCIAL

## 2021-02-05 ENCOUNTER — PATIENT MESSAGE (OUTPATIENT)
Dept: NEUROLOGY | Facility: CLINIC | Age: 46
End: 2021-02-05

## 2021-02-05 VITALS
RESPIRATION RATE: 18 BRPM | TEMPERATURE: 99 F | HEART RATE: 93 BPM | DIASTOLIC BLOOD PRESSURE: 66 MMHG | SYSTOLIC BLOOD PRESSURE: 123 MMHG

## 2021-02-05 DIAGNOSIS — G35 MS (MULTIPLE SCLEROSIS): Primary | ICD-10-CM

## 2021-02-05 PROCEDURE — 96366 THER/PROPH/DIAG IV INF ADDON: CPT

## 2021-02-05 PROCEDURE — 96375 TX/PRO/DX INJ NEW DRUG ADDON: CPT

## 2021-02-05 PROCEDURE — 96367 TX/PROPH/DG ADDL SEQ IV INF: CPT

## 2021-02-05 PROCEDURE — 96365 THER/PROPH/DIAG IV INF INIT: CPT

## 2021-02-05 PROCEDURE — 63600175 PHARM REV CODE 636 W HCPCS: Performed by: PSYCHIATRY & NEUROLOGY

## 2021-02-05 PROCEDURE — 25000003 PHARM REV CODE 250: Performed by: PSYCHIATRY & NEUROLOGY

## 2021-02-05 RX ORDER — DIPHENHYDRAMINE HYDROCHLORIDE 50 MG/ML
50 INJECTION INTRAMUSCULAR; INTRAVENOUS
Status: DISCONTINUED | OUTPATIENT
Start: 2021-02-05 | End: 2021-02-05 | Stop reason: HOSPADM

## 2021-02-05 RX ORDER — HEPARIN 100 UNIT/ML
500 SYRINGE INTRAVENOUS
Status: CANCELLED | OUTPATIENT
Start: 2021-06-25

## 2021-02-05 RX ORDER — DIPHENHYDRAMINE HYDROCHLORIDE 50 MG/ML
50 INJECTION INTRAMUSCULAR; INTRAVENOUS
Status: CANCELLED | OUTPATIENT
Start: 2021-06-25

## 2021-02-05 RX ORDER — ACETAMINOPHEN 500 MG
1000 TABLET ORAL
Status: COMPLETED | OUTPATIENT
Start: 2021-02-05 | End: 2021-02-05

## 2021-02-05 RX ORDER — FAMOTIDINE 10 MG/ML
20 INJECTION INTRAVENOUS
Status: CANCELLED | OUTPATIENT
Start: 2021-06-25

## 2021-02-05 RX ORDER — HEPARIN 100 UNIT/ML
500 SYRINGE INTRAVENOUS
Status: DISCONTINUED | OUTPATIENT
Start: 2021-02-05 | End: 2021-02-05 | Stop reason: HOSPADM

## 2021-02-05 RX ORDER — SODIUM CHLORIDE 0.9 % (FLUSH) 0.9 %
10 SYRINGE (ML) INJECTION
Status: CANCELLED | OUTPATIENT
Start: 2021-06-25

## 2021-02-05 RX ORDER — EPINEPHRINE 0.3 MG/.3ML
0.3 INJECTION SUBCUTANEOUS
Status: CANCELLED | OUTPATIENT
Start: 2021-06-25

## 2021-02-05 RX ORDER — EPINEPHRINE 0.3 MG/.3ML
0.3 INJECTION SUBCUTANEOUS
Status: DISCONTINUED | OUTPATIENT
Start: 2021-02-05 | End: 2021-02-05 | Stop reason: HOSPADM

## 2021-02-05 RX ORDER — ACETAMINOPHEN 500 MG
1000 TABLET ORAL
Status: CANCELLED | OUTPATIENT
Start: 2021-06-25

## 2021-02-05 RX ORDER — SODIUM CHLORIDE 0.9 % (FLUSH) 0.9 %
10 SYRINGE (ML) INJECTION
Status: DISCONTINUED | OUTPATIENT
Start: 2021-02-05 | End: 2021-02-05 | Stop reason: HOSPADM

## 2021-02-05 RX ORDER — FAMOTIDINE 10 MG/ML
20 INJECTION INTRAVENOUS
Status: COMPLETED | OUTPATIENT
Start: 2021-02-05 | End: 2021-02-05

## 2021-02-05 RX ADMIN — DIPHENHYDRAMINE HYDROCHLORIDE 50 MG: 50 INJECTION, SOLUTION INTRAMUSCULAR; INTRAVENOUS at 07:02

## 2021-02-05 RX ADMIN — OCRELIZUMAB 600 MG: 300 INJECTION INTRAVENOUS at 08:02

## 2021-02-05 RX ADMIN — FAMOTIDINE 20 MG: 10 INJECTION INTRAVENOUS at 07:02

## 2021-02-05 RX ADMIN — ACETAMINOPHEN 1000 MG: 500 TABLET ORAL at 07:02

## 2021-02-05 RX ADMIN — SODIUM CHLORIDE: 0.9 INJECTION, SOLUTION INTRAVENOUS at 07:02

## 2021-02-05 RX ADMIN — DEXTROSE: 50 INJECTION, SOLUTION INTRAVENOUS at 07:02

## 2021-04-21 ENCOUNTER — OFFICE VISIT (OUTPATIENT)
Dept: NEUROLOGY | Facility: CLINIC | Age: 46
End: 2021-04-21
Payer: COMMERCIAL

## 2021-04-21 VITALS
DIASTOLIC BLOOD PRESSURE: 91 MMHG | BODY MASS INDEX: 37.73 KG/M2 | HEIGHT: 64 IN | SYSTOLIC BLOOD PRESSURE: 141 MMHG | HEART RATE: 92 BPM | WEIGHT: 221 LBS

## 2021-04-21 DIAGNOSIS — R26.9 GAIT DISTURBANCE: ICD-10-CM

## 2021-04-21 DIAGNOSIS — Z71.89 COUNSELING REGARDING GOALS OF CARE: ICD-10-CM

## 2021-04-21 DIAGNOSIS — G47.33 OSA (OBSTRUCTIVE SLEEP APNEA): ICD-10-CM

## 2021-04-21 DIAGNOSIS — R53.83 FATIGUE, UNSPECIFIED TYPE: Primary | ICD-10-CM

## 2021-04-21 DIAGNOSIS — G35 MS (MULTIPLE SCLEROSIS): ICD-10-CM

## 2021-04-21 DIAGNOSIS — N31.9 NEUROGENIC BLADDER: ICD-10-CM

## 2021-04-21 DIAGNOSIS — M62.838 MUSCLE SPASM: ICD-10-CM

## 2021-04-21 PROCEDURE — 99999 PR PBB SHADOW E&M-EST. PATIENT-LVL IV: CPT | Mod: PBBFAC,,, | Performed by: PSYCHIATRY & NEUROLOGY

## 2021-04-21 PROCEDURE — 1126F AMNT PAIN NOTED NONE PRSNT: CPT | Mod: S$GLB,,, | Performed by: PSYCHIATRY & NEUROLOGY

## 2021-04-21 PROCEDURE — 3008F BODY MASS INDEX DOCD: CPT | Mod: CPTII,S$GLB,, | Performed by: PSYCHIATRY & NEUROLOGY

## 2021-04-21 PROCEDURE — 3008F PR BODY MASS INDEX (BMI) DOCUMENTED: ICD-10-PCS | Mod: CPTII,S$GLB,, | Performed by: PSYCHIATRY & NEUROLOGY

## 2021-04-21 PROCEDURE — 99999 PR PBB SHADOW E&M-EST. PATIENT-LVL IV: ICD-10-PCS | Mod: PBBFAC,,, | Performed by: PSYCHIATRY & NEUROLOGY

## 2021-04-21 PROCEDURE — 1126F PR PAIN SEVERITY QUANTIFIED, NO PAIN PRESENT: ICD-10-PCS | Mod: S$GLB,,, | Performed by: PSYCHIATRY & NEUROLOGY

## 2021-04-21 PROCEDURE — 99215 OFFICE O/P EST HI 40 MIN: CPT | Mod: S$GLB,,, | Performed by: PSYCHIATRY & NEUROLOGY

## 2021-04-21 PROCEDURE — 99215 PR OFFICE/OUTPT VISIT, EST, LEVL V, 40-54 MIN: ICD-10-PCS | Mod: S$GLB,,, | Performed by: PSYCHIATRY & NEUROLOGY

## 2021-04-21 RX ORDER — TOLTERODINE 2 MG/1
2 CAPSULE, EXTENDED RELEASE ORAL DAILY
Qty: 30 CAPSULE | Refills: 3 | Status: SHIPPED | OUTPATIENT
Start: 2021-04-21 | End: 2022-03-17

## 2021-04-21 RX ORDER — BACLOFEN 10 MG/1
10 TABLET ORAL NIGHTLY
Qty: 30 TABLET | Refills: 11 | Status: SHIPPED | OUTPATIENT
Start: 2021-04-21 | End: 2021-09-28

## 2021-05-31 ENCOUNTER — PATIENT MESSAGE (OUTPATIENT)
Dept: PSYCHIATRY | Facility: CLINIC | Age: 46
End: 2021-05-31

## 2021-06-28 ENCOUNTER — HOSPITAL ENCOUNTER (OUTPATIENT)
Dept: RADIOLOGY | Facility: HOSPITAL | Age: 46
Discharge: HOME | End: 2021-06-28
Attending: PSYCHIATRY & NEUROLOGY
Payer: COMMERCIAL

## 2021-06-28 DIAGNOSIS — G35 MS (MULTIPLE SCLEROSIS): ICD-10-CM

## 2021-06-28 PROCEDURE — 70551 MRI BRAIN STEM W/O DYE: CPT | Mod: TC

## 2021-06-28 PROCEDURE — 70551 MRI BRAIN STEM W/O DYE: CPT | Mod: 26,,, | Performed by: RADIOLOGY

## 2021-06-28 PROCEDURE — 70551 MRI BRAIN DEMYELINATING WITHOUT CONTRAST: ICD-10-PCS | Mod: 26,,, | Performed by: RADIOLOGY

## 2021-07-14 ENCOUNTER — LAB VISIT (OUTPATIENT)
Dept: LAB | Facility: HOSPITAL | Age: 46
End: 2021-07-14
Attending: PSYCHIATRY & NEUROLOGY
Payer: COMMERCIAL

## 2021-07-14 DIAGNOSIS — G35 MS (MULTIPLE SCLEROSIS): ICD-10-CM

## 2021-07-14 LAB
BASOPHILS # BLD AUTO: 0.09 K/UL (ref 0–0.2)
BASOPHILS NFR BLD: 0.5 % (ref 0–1.9)
DIFFERENTIAL METHOD: ABNORMAL
EOSINOPHIL # BLD AUTO: 0.1 K/UL (ref 0–0.5)
EOSINOPHIL NFR BLD: 0.7 % (ref 0–8)
ERYTHROCYTE [DISTWIDTH] IN BLOOD BY AUTOMATED COUNT: 16.6 % (ref 11.5–14.5)
HCT VFR BLD AUTO: 38.9 % (ref 37–48.5)
HGB BLD-MCNC: 12.5 G/DL (ref 12–16)
IMM GRANULOCYTES # BLD AUTO: 0.09 K/UL (ref 0–0.04)
IMM GRANULOCYTES NFR BLD AUTO: 0.5 % (ref 0–0.5)
LYMPHOCYTES # BLD AUTO: 3.7 K/UL (ref 1–4.8)
LYMPHOCYTES NFR BLD: 20 % (ref 18–48)
MCH RBC QN AUTO: 22.9 PG (ref 27–31)
MCHC RBC AUTO-ENTMCNC: 32.1 G/DL (ref 32–36)
MCV RBC AUTO: 71 FL (ref 82–98)
MONOCYTES # BLD AUTO: 1.4 K/UL (ref 0.3–1)
MONOCYTES NFR BLD: 7.7 % (ref 4–15)
NEUTROPHILS # BLD AUTO: 13 K/UL (ref 1.8–7.7)
NEUTROPHILS NFR BLD: 70.6 % (ref 38–73)
NRBC BLD-RTO: 0 /100 WBC
PLATELET # BLD AUTO: 430 K/UL (ref 150–450)
PMV BLD AUTO: 10 FL (ref 9.2–12.9)
RBC # BLD AUTO: 5.45 M/UL (ref 4–5.4)
WBC # BLD AUTO: 18.4 K/UL (ref 3.9–12.7)

## 2021-07-14 PROCEDURE — 86706 HEP B SURFACE ANTIBODY: CPT | Mod: PO | Performed by: PSYCHIATRY & NEUROLOGY

## 2021-07-14 PROCEDURE — 87340 HEPATITIS B SURFACE AG IA: CPT | Mod: PO | Performed by: PSYCHIATRY & NEUROLOGY

## 2021-07-14 PROCEDURE — 86704 HEP B CORE ANTIBODY TOTAL: CPT | Mod: PO | Performed by: PSYCHIATRY & NEUROLOGY

## 2021-07-14 PROCEDURE — 88185 FLOWCYTOMETRY/TC ADD-ON: CPT | Mod: PO | Performed by: PSYCHIATRY & NEUROLOGY

## 2021-07-14 PROCEDURE — 85025 COMPLETE CBC W/AUTO DIFF WBC: CPT | Mod: PO | Performed by: PSYCHIATRY & NEUROLOGY

## 2021-07-15 ENCOUNTER — TELEPHONE (OUTPATIENT)
Dept: NEUROLOGY | Facility: CLINIC | Age: 46
End: 2021-07-15

## 2021-07-15 DIAGNOSIS — D72.829 LEUKOCYTOSIS, UNSPECIFIED TYPE: Primary | ICD-10-CM

## 2021-07-15 DIAGNOSIS — D72.828 GRANULOCYTOSIS: ICD-10-CM

## 2021-07-16 LAB
PATH REPORT.FINAL DX SPEC: NORMAL
RITUXAN SENSITIVITY (CD20): NORMAL

## 2021-08-03 NOTE — TELEPHONE ENCOUNTER
CC'd Chart Routing History    Routing History    From: Nayely Maya MD On: 04/21/2021 10:00 AM  To: Zulma CASEY Staff ([place])  Priority: Routine  Routing Comments:  Team, we are on track for August Ocrevus infusion; labs in July; For now, she does not want to do the vaccine;

## 2021-08-04 NOTE — TELEPHONE ENCOUNTER
Reviewed labs from 7/14/21:   Hep B Core Ab negative, Surface Ab positive, Surface Ag negative (past vaccination)  B cells are 0  WBC=18.40 (chronically elevated); ANC elevated--higher than in the past

## 2021-08-05 ENCOUNTER — PATIENT MESSAGE (OUTPATIENT)
Dept: NEUROLOGY | Facility: CLINIC | Age: 46
End: 2021-08-05

## 2021-08-13 ENCOUNTER — OFFICE VISIT (OUTPATIENT)
Dept: HEMATOLOGY/ONCOLOGY | Facility: CLINIC | Age: 46
End: 2021-08-13
Payer: COMMERCIAL

## 2021-08-13 DIAGNOSIS — D72.828 GRANULOCYTOSIS: ICD-10-CM

## 2021-08-13 DIAGNOSIS — D72.829 LEUKOCYTOSIS, UNSPECIFIED TYPE: ICD-10-CM

## 2021-08-13 PROCEDURE — 99204 PR OFFICE/OUTPT VISIT, NEW, LEVL IV, 45-59 MIN: ICD-10-PCS | Mod: 95,,, | Performed by: INTERNAL MEDICINE

## 2021-08-13 PROCEDURE — 1160F PR REVIEW ALL MEDS BY PRESCRIBER/CLIN PHARMACIST DOCUMENTED: ICD-10-PCS | Mod: CPTII,,, | Performed by: INTERNAL MEDICINE

## 2021-08-13 PROCEDURE — 1160F RVW MEDS BY RX/DR IN RCRD: CPT | Mod: CPTII,,, | Performed by: INTERNAL MEDICINE

## 2021-08-13 PROCEDURE — 1159F PR MEDICATION LIST DOCUMENTED IN MEDICAL RECORD: ICD-10-PCS | Mod: CPTII,,, | Performed by: INTERNAL MEDICINE

## 2021-08-13 PROCEDURE — 1159F MED LIST DOCD IN RCRD: CPT | Mod: CPTII,,, | Performed by: INTERNAL MEDICINE

## 2021-08-13 PROCEDURE — 99204 OFFICE O/P NEW MOD 45 MIN: CPT | Mod: 95,,, | Performed by: INTERNAL MEDICINE

## 2021-08-16 ENCOUNTER — LAB VISIT (OUTPATIENT)
Dept: LAB | Facility: HOSPITAL | Age: 46
End: 2021-08-16
Attending: FAMILY MEDICINE
Payer: COMMERCIAL

## 2021-08-16 DIAGNOSIS — D72.829 LEUKOCYTOSIS, UNSPECIFIED TYPE: ICD-10-CM

## 2021-08-16 LAB
BASOPHILS # BLD AUTO: 0.08 K/UL (ref 0–0.2)
BASOPHILS NFR BLD: 0.5 % (ref 0–1.9)
DIFFERENTIAL METHOD: ABNORMAL
EOSINOPHIL # BLD AUTO: 0.1 K/UL (ref 0–0.5)
EOSINOPHIL NFR BLD: 0.8 % (ref 0–8)
ERYTHROCYTE [DISTWIDTH] IN BLOOD BY AUTOMATED COUNT: 17.4 % (ref 11.5–14.5)
HCT VFR BLD AUTO: 37.5 % (ref 37–48.5)
HGB BLD-MCNC: 11.8 G/DL (ref 12–16)
IMM GRANULOCYTES # BLD AUTO: 0.07 K/UL (ref 0–0.04)
IMM GRANULOCYTES NFR BLD AUTO: 0.5 % (ref 0–0.5)
LYMPHOCYTES # BLD AUTO: 2.7 K/UL (ref 1–4.8)
LYMPHOCYTES NFR BLD: 17.4 % (ref 18–48)
MCH RBC QN AUTO: 22.5 PG (ref 27–31)
MCHC RBC AUTO-ENTMCNC: 31.5 G/DL (ref 32–36)
MCV RBC AUTO: 71 FL (ref 82–98)
MONOCYTES # BLD AUTO: 0.9 K/UL (ref 0.3–1)
MONOCYTES NFR BLD: 6 % (ref 4–15)
NEUTROPHILS # BLD AUTO: 11.6 K/UL (ref 1.8–7.7)
NEUTROPHILS NFR BLD: 74.8 % (ref 38–73)
NRBC BLD-RTO: 0 /100 WBC
PLATELET # BLD AUTO: 420 K/UL (ref 150–450)
PMV BLD AUTO: 10.3 FL (ref 9.2–12.9)
RBC # BLD AUTO: 5.25 M/UL (ref 4–5.4)
WBC # BLD AUTO: 15.55 K/UL (ref 3.9–12.7)

## 2021-08-16 PROCEDURE — 36415 COLL VENOUS BLD VENIPUNCTURE: CPT | Performed by: INTERNAL MEDICINE

## 2021-08-16 PROCEDURE — 81270 JAK2 GENE: CPT | Performed by: INTERNAL MEDICINE

## 2021-08-16 PROCEDURE — 81206 BCR/ABL1 GENE MAJOR BP: CPT | Performed by: INTERNAL MEDICINE

## 2021-08-16 PROCEDURE — 81207 BCR/ABL1 GENE MINOR BP: CPT | Performed by: INTERNAL MEDICINE

## 2021-08-16 PROCEDURE — 85025 COMPLETE CBC W/AUTO DIFF WBC: CPT | Performed by: INTERNAL MEDICINE

## 2021-08-18 ENCOUNTER — PATIENT MESSAGE (OUTPATIENT)
Dept: NEUROLOGY | Facility: CLINIC | Age: 46
End: 2021-08-18

## 2021-08-18 DIAGNOSIS — G35 MS (MULTIPLE SCLEROSIS): Primary | ICD-10-CM

## 2021-08-18 DIAGNOSIS — Z13.9 SCREENING FOR CONDITION: ICD-10-CM

## 2021-08-18 LAB
BCR/ABL,P210 RESULT: NORMAL
PATH REPORT.FINAL DX SPEC: NORMAL
SPECIMEN TYPE: NORMAL

## 2021-08-19 LAB
JAK2 P.V617F BLD/T QL: NORMAL
JAK2 V617F MUTATION DETECTION BLD RESULT: NORMAL

## 2021-08-20 ENCOUNTER — TELEPHONE (OUTPATIENT)
Dept: NEUROLOGY | Facility: CLINIC | Age: 46
End: 2021-08-20

## 2021-08-20 ENCOUNTER — OFFICE VISIT (OUTPATIENT)
Dept: NEUROLOGY | Facility: CLINIC | Age: 46
End: 2021-08-20
Payer: COMMERCIAL

## 2021-08-20 DIAGNOSIS — Z29.89 PROPHYLACTIC IMMUNOTHERAPY: ICD-10-CM

## 2021-08-20 DIAGNOSIS — M62.838 MUSCLE SPASM: ICD-10-CM

## 2021-08-20 DIAGNOSIS — G35 MULTIPLE SCLEROSIS: Primary | ICD-10-CM

## 2021-08-20 DIAGNOSIS — Z79.899 HIGH RISK MEDICATION USE: ICD-10-CM

## 2021-08-20 DIAGNOSIS — Z71.89 COUNSELING REGARDING GOALS OF CARE: ICD-10-CM

## 2021-08-20 LAB
BCR/ABL RESULT, P190, QUANT, BLD: NORMAL
PATH REPORT.FINAL DX SPEC: NORMAL
SPECIMEN TYPE, P190, QUANT, BLD: NORMAL

## 2021-08-20 PROCEDURE — 99215 OFFICE O/P EST HI 40 MIN: CPT | Mod: 95,,, | Performed by: CLINICAL NURSE SPECIALIST

## 2021-08-20 PROCEDURE — 1159F MED LIST DOCD IN RCRD: CPT | Mod: CPTII,,, | Performed by: CLINICAL NURSE SPECIALIST

## 2021-08-20 PROCEDURE — 1159F PR MEDICATION LIST DOCUMENTED IN MEDICAL RECORD: ICD-10-PCS | Mod: CPTII,,, | Performed by: CLINICAL NURSE SPECIALIST

## 2021-08-20 PROCEDURE — 99215 PR OFFICE/OUTPT VISIT, EST, LEVL V, 40-54 MIN: ICD-10-PCS | Mod: 95,,, | Performed by: CLINICAL NURSE SPECIALIST

## 2021-08-20 RX ORDER — LOSARTAN POTASSIUM AND HYDROCHLOROTHIAZIDE 12.5; 5 MG/1; MG/1
1 TABLET ORAL DAILY
COMMUNITY

## 2021-08-20 RX ORDER — FERROUS SULFATE 325(65) MG
325 TABLET, DELAYED RELEASE (ENTERIC COATED) ORAL
COMMUNITY
End: 2022-03-17

## 2021-09-01 ENCOUNTER — PATIENT MESSAGE (OUTPATIENT)
Dept: PSYCHIATRY | Facility: CLINIC | Age: 46
End: 2021-09-01

## 2021-09-02 ENCOUNTER — PATIENT MESSAGE (OUTPATIENT)
Dept: PSYCHIATRY | Facility: CLINIC | Age: 46
End: 2021-09-02

## 2021-09-04 ENCOUNTER — PATIENT MESSAGE (OUTPATIENT)
Dept: NEUROLOGY | Facility: CLINIC | Age: 46
End: 2021-09-04

## 2021-09-07 ENCOUNTER — PATIENT MESSAGE (OUTPATIENT)
Dept: NEUROLOGY | Facility: CLINIC | Age: 46
End: 2021-09-07

## 2021-09-07 DIAGNOSIS — Z13.9 SCREENING FOR CONDITION: Primary | ICD-10-CM

## 2021-09-08 ENCOUNTER — DOCUMENTATION ONLY (OUTPATIENT)
Dept: NEUROLOGY | Facility: CLINIC | Age: 46
End: 2021-09-08

## 2021-09-08 RX ORDER — HEPARIN 100 UNIT/ML
500 SYRINGE INTRAVENOUS
Status: CANCELLED | OUTPATIENT
Start: 2021-09-08

## 2021-09-08 RX ORDER — FAMOTIDINE 10 MG/ML
20 INJECTION INTRAVENOUS
Status: CANCELLED | OUTPATIENT
Start: 2021-09-08

## 2021-09-08 RX ORDER — ACETAMINOPHEN 500 MG
1000 TABLET ORAL
Status: CANCELLED | OUTPATIENT
Start: 2021-09-08

## 2021-09-08 RX ORDER — SODIUM CHLORIDE 0.9 % (FLUSH) 0.9 %
10 SYRINGE (ML) INJECTION
Status: CANCELLED | OUTPATIENT
Start: 2021-09-08

## 2021-09-08 RX ORDER — EPINEPHRINE 0.3 MG/.3ML
0.3 INJECTION SUBCUTANEOUS
Status: CANCELLED | OUTPATIENT
Start: 2021-09-08

## 2021-09-08 RX ORDER — DIPHENHYDRAMINE HYDROCHLORIDE 50 MG/ML
50 INJECTION INTRAMUSCULAR; INTRAVENOUS
Status: CANCELLED | OUTPATIENT
Start: 2021-09-08

## 2021-09-12 ENCOUNTER — LAB VISIT (OUTPATIENT)
Dept: URGENT CARE | Facility: CLINIC | Age: 46
End: 2021-09-12
Payer: COMMERCIAL

## 2021-09-12 DIAGNOSIS — Z13.9 SCREENING FOR CONDITION: ICD-10-CM

## 2021-09-12 PROCEDURE — U0003 INFECTIOUS AGENT DETECTION BY NUCLEIC ACID (DNA OR RNA); SEVERE ACUTE RESPIRATORY SYNDROME CORONAVIRUS 2 (SARS-COV-2) (CORONAVIRUS DISEASE [COVID-19]), AMPLIFIED PROBE TECHNIQUE, MAKING USE OF HIGH THROUGHPUT TECHNOLOGIES AS DESCRIBED BY CMS-2020-01-R: HCPCS | Performed by: CLINICAL NURSE SPECIALIST

## 2021-09-12 PROCEDURE — U0005 INFEC AGEN DETEC AMPLI PROBE: HCPCS | Performed by: CLINICAL NURSE SPECIALIST

## 2021-09-13 LAB
SARS-COV-2 RNA RESP QL NAA+PROBE: NOT DETECTED
SARS-COV-2- CYCLE NUMBER: NORMAL

## 2021-09-15 ENCOUNTER — INFUSION (OUTPATIENT)
Dept: INFUSION THERAPY | Facility: HOSPITAL | Age: 46
End: 2021-09-15
Payer: COMMERCIAL

## 2021-09-15 VITALS
SYSTOLIC BLOOD PRESSURE: 121 MMHG | DIASTOLIC BLOOD PRESSURE: 73 MMHG | TEMPERATURE: 99 F | WEIGHT: 223.13 LBS | BODY MASS INDEX: 38.09 KG/M2 | OXYGEN SATURATION: 99 % | HEIGHT: 64 IN | RESPIRATION RATE: 17 BRPM | HEART RATE: 80 BPM

## 2021-09-15 DIAGNOSIS — G35 MS (MULTIPLE SCLEROSIS): Primary | ICD-10-CM

## 2021-09-15 PROCEDURE — 96366 THER/PROPH/DIAG IV INF ADDON: CPT

## 2021-09-15 PROCEDURE — 25000003 PHARM REV CODE 250: Performed by: PSYCHIATRY & NEUROLOGY

## 2021-09-15 PROCEDURE — 63600175 PHARM REV CODE 636 W HCPCS: Mod: JG | Performed by: PSYCHIATRY & NEUROLOGY

## 2021-09-15 PROCEDURE — 96365 THER/PROPH/DIAG IV INF INIT: CPT

## 2021-09-15 PROCEDURE — 96375 TX/PRO/DX INJ NEW DRUG ADDON: CPT

## 2021-09-15 PROCEDURE — 96367 TX/PROPH/DG ADDL SEQ IV INF: CPT

## 2021-09-15 RX ORDER — FAMOTIDINE 10 MG/ML
20 INJECTION INTRAVENOUS
Status: CANCELLED | OUTPATIENT
Start: 2021-12-08

## 2021-09-15 RX ORDER — SODIUM CHLORIDE 0.9 % (FLUSH) 0.9 %
10 SYRINGE (ML) INJECTION
Status: DISCONTINUED | OUTPATIENT
Start: 2021-09-15 | End: 2021-09-15 | Stop reason: HOSPADM

## 2021-09-15 RX ORDER — HEPARIN 100 UNIT/ML
500 SYRINGE INTRAVENOUS
Status: CANCELLED | OUTPATIENT
Start: 2021-12-08

## 2021-09-15 RX ORDER — DIPHENHYDRAMINE HYDROCHLORIDE 50 MG/ML
50 INJECTION INTRAMUSCULAR; INTRAVENOUS
Status: DISCONTINUED | OUTPATIENT
Start: 2021-09-15 | End: 2021-09-15 | Stop reason: HOSPADM

## 2021-09-15 RX ORDER — ACETAMINOPHEN 500 MG
1000 TABLET ORAL
Status: CANCELLED | OUTPATIENT
Start: 2021-12-08

## 2021-09-15 RX ORDER — HEPARIN 100 UNIT/ML
500 SYRINGE INTRAVENOUS
Status: DISCONTINUED | OUTPATIENT
Start: 2021-09-15 | End: 2021-09-15 | Stop reason: HOSPADM

## 2021-09-15 RX ORDER — EPINEPHRINE 0.3 MG/.3ML
0.3 INJECTION SUBCUTANEOUS
Status: CANCELLED | OUTPATIENT
Start: 2021-12-08

## 2021-09-15 RX ORDER — DIPHENHYDRAMINE HYDROCHLORIDE 50 MG/ML
50 INJECTION INTRAMUSCULAR; INTRAVENOUS
Status: CANCELLED | OUTPATIENT
Start: 2021-12-08

## 2021-09-15 RX ORDER — EPINEPHRINE 0.3 MG/.3ML
0.3 INJECTION SUBCUTANEOUS
Status: DISCONTINUED | OUTPATIENT
Start: 2021-09-15 | End: 2021-09-15 | Stop reason: HOSPADM

## 2021-09-15 RX ORDER — ACETAMINOPHEN 500 MG
1000 TABLET ORAL
Status: COMPLETED | OUTPATIENT
Start: 2021-09-15 | End: 2021-09-15

## 2021-09-15 RX ORDER — SODIUM CHLORIDE 0.9 % (FLUSH) 0.9 %
10 SYRINGE (ML) INJECTION
Status: CANCELLED | OUTPATIENT
Start: 2021-12-08

## 2021-09-15 RX ORDER — FAMOTIDINE 10 MG/ML
20 INJECTION INTRAVENOUS
Status: COMPLETED | OUTPATIENT
Start: 2021-09-15 | End: 2021-09-15

## 2021-09-15 RX ADMIN — ACETAMINOPHEN 1000 MG: 500 TABLET ORAL at 08:09

## 2021-09-15 RX ADMIN — OCRELIZUMAB 600 MG: 300 INJECTION INTRAVENOUS at 09:09

## 2021-09-15 RX ADMIN — SODIUM CHLORIDE: 0.9 INJECTION, SOLUTION INTRAVENOUS at 08:09

## 2021-09-15 RX ADMIN — DEXTROSE: 50 INJECTION, SOLUTION INTRAVENOUS at 08:09

## 2021-09-15 RX ADMIN — FAMOTIDINE 20 MG: 10 INJECTION INTRAVENOUS at 08:09

## 2021-09-15 RX ADMIN — DIPHENHYDRAMINE HYDROCHLORIDE 50 MG: 50 INJECTION, SOLUTION INTRAMUSCULAR; INTRAVENOUS at 08:09

## 2021-12-21 ENCOUNTER — PATIENT MESSAGE (OUTPATIENT)
Dept: NEUROLOGY | Facility: CLINIC | Age: 46
End: 2021-12-21
Payer: COMMERCIAL

## 2022-02-18 ENCOUNTER — TELEPHONE (OUTPATIENT)
Dept: NEUROLOGY | Facility: CLINIC | Age: 47
End: 2022-02-18

## 2022-02-18 DIAGNOSIS — G35 MULTIPLE SCLEROSIS: Primary | ICD-10-CM

## 2022-02-18 NOTE — TELEPHONE ENCOUNTER
Spoke with patient and scheduled her labs for 2/22 per her request and f/u with Daisy on 3/17 first available.

## 2022-02-22 ENCOUNTER — LAB VISIT (OUTPATIENT)
Dept: LAB | Facility: HOSPITAL | Age: 47
End: 2022-02-22
Attending: FAMILY MEDICINE
Payer: COMMERCIAL

## 2022-02-22 DIAGNOSIS — G35 MULTIPLE SCLEROSIS: ICD-10-CM

## 2022-02-22 LAB
BASOPHILS # BLD AUTO: 0.08 K/UL (ref 0–0.2)
BASOPHILS NFR BLD: 0.5 % (ref 0–1.9)
DIFFERENTIAL METHOD: ABNORMAL
EOSINOPHIL # BLD AUTO: 0.1 K/UL (ref 0–0.5)
EOSINOPHIL NFR BLD: 0.5 % (ref 0–8)
ERYTHROCYTE [DISTWIDTH] IN BLOOD BY AUTOMATED COUNT: 19.1 % (ref 11.5–14.5)
HCT VFR BLD AUTO: 40.2 % (ref 37–48.5)
HGB BLD-MCNC: 12.4 G/DL (ref 12–16)
IMM GRANULOCYTES # BLD AUTO: 0.05 K/UL (ref 0–0.04)
IMM GRANULOCYTES NFR BLD AUTO: 0.3 % (ref 0–0.5)
LYMPHOCYTES # BLD AUTO: 2.6 K/UL (ref 1–4.8)
LYMPHOCYTES NFR BLD: 17.5 % (ref 18–48)
MCH RBC QN AUTO: 23 PG (ref 27–31)
MCHC RBC AUTO-ENTMCNC: 30.8 G/DL (ref 32–36)
MCV RBC AUTO: 75 FL (ref 82–98)
MONOCYTES # BLD AUTO: 0.9 K/UL (ref 0.3–1)
MONOCYTES NFR BLD: 5.8 % (ref 4–15)
NEUTROPHILS # BLD AUTO: 11.4 K/UL (ref 1.8–7.7)
NEUTROPHILS NFR BLD: 75.4 % (ref 38–73)
NRBC BLD-RTO: 0 /100 WBC
PLATELET # BLD AUTO: 398 K/UL (ref 150–450)
PMV BLD AUTO: 9.8 FL (ref 9.2–12.9)
RBC # BLD AUTO: 5.38 M/UL (ref 4–5.4)
WBC # BLD AUTO: 15.07 K/UL (ref 3.9–12.7)

## 2022-02-22 PROCEDURE — 86704 HEP B CORE ANTIBODY TOTAL: CPT | Performed by: CLINICAL NURSE SPECIALIST

## 2022-02-22 PROCEDURE — 86706 HEP B SURFACE ANTIBODY: CPT | Performed by: CLINICAL NURSE SPECIALIST

## 2022-02-22 PROCEDURE — 85025 COMPLETE CBC W/AUTO DIFF WBC: CPT | Performed by: CLINICAL NURSE SPECIALIST

## 2022-02-22 PROCEDURE — 87340 HEPATITIS B SURFACE AG IA: CPT | Performed by: CLINICAL NURSE SPECIALIST

## 2022-02-22 PROCEDURE — 36415 COLL VENOUS BLD VENIPUNCTURE: CPT | Mod: PO | Performed by: CLINICAL NURSE SPECIALIST

## 2022-02-28 ENCOUNTER — PATIENT MESSAGE (OUTPATIENT)
Dept: PSYCHIATRY | Facility: CLINIC | Age: 47
End: 2022-02-28
Payer: COMMERCIAL

## 2022-02-28 LAB
HBV CORE AB SERPL QL IA: NEGATIVE
HBV SURFACE AB SER-ACNC: NORMAL M[IU]/ML
HBV SURFACE AG SERPL QL IA: NEGATIVE

## 2022-03-17 ENCOUNTER — PATIENT MESSAGE (OUTPATIENT)
Dept: NEUROLOGY | Facility: CLINIC | Age: 47
End: 2022-03-17

## 2022-03-17 ENCOUNTER — OFFICE VISIT (OUTPATIENT)
Dept: NEUROLOGY | Facility: CLINIC | Age: 47
End: 2022-03-17
Payer: COMMERCIAL

## 2022-03-17 VITALS
HEIGHT: 64 IN | WEIGHT: 218.81 LBS | HEART RATE: 96 BPM | SYSTOLIC BLOOD PRESSURE: 132 MMHG | DIASTOLIC BLOOD PRESSURE: 87 MMHG | BODY MASS INDEX: 37.36 KG/M2

## 2022-03-17 DIAGNOSIS — R26.9 GAIT DISTURBANCE: ICD-10-CM

## 2022-03-17 DIAGNOSIS — M62.838 MUSCLE SPASM: ICD-10-CM

## 2022-03-17 DIAGNOSIS — Z79.899 HIGH RISK MEDICATION USE: ICD-10-CM

## 2022-03-17 DIAGNOSIS — Z13.9 SCREENING FOR CONDITION: Primary | ICD-10-CM

## 2022-03-17 DIAGNOSIS — G35 MULTIPLE SCLEROSIS: Primary | ICD-10-CM

## 2022-03-17 DIAGNOSIS — Z29.89 PROPHYLACTIC IMMUNOTHERAPY: ICD-10-CM

## 2022-03-17 DIAGNOSIS — Z71.89 COUNSELING REGARDING GOALS OF CARE: ICD-10-CM

## 2022-03-17 PROCEDURE — 3079F DIAST BP 80-89 MM HG: CPT | Mod: CPTII,S$GLB,, | Performed by: CLINICAL NURSE SPECIALIST

## 2022-03-17 PROCEDURE — 1159F MED LIST DOCD IN RCRD: CPT | Mod: CPTII,S$GLB,, | Performed by: CLINICAL NURSE SPECIALIST

## 2022-03-17 PROCEDURE — 1159F PR MEDICATION LIST DOCUMENTED IN MEDICAL RECORD: ICD-10-PCS | Mod: CPTII,S$GLB,, | Performed by: CLINICAL NURSE SPECIALIST

## 2022-03-17 PROCEDURE — 99215 OFFICE O/P EST HI 40 MIN: CPT | Mod: S$GLB,,, | Performed by: CLINICAL NURSE SPECIALIST

## 2022-03-17 PROCEDURE — 99999 PR PBB SHADOW E&M-EST. PATIENT-LVL IV: CPT | Mod: PBBFAC,,, | Performed by: CLINICAL NURSE SPECIALIST

## 2022-03-17 PROCEDURE — 99215 PR OFFICE/OUTPT VISIT, EST, LEVL V, 40-54 MIN: ICD-10-PCS | Mod: S$GLB,,, | Performed by: CLINICAL NURSE SPECIALIST

## 2022-03-17 PROCEDURE — 3008F BODY MASS INDEX DOCD: CPT | Mod: CPTII,S$GLB,, | Performed by: CLINICAL NURSE SPECIALIST

## 2022-03-17 PROCEDURE — 3008F PR BODY MASS INDEX (BMI) DOCUMENTED: ICD-10-PCS | Mod: CPTII,S$GLB,, | Performed by: CLINICAL NURSE SPECIALIST

## 2022-03-17 PROCEDURE — 3075F SYST BP GE 130 - 139MM HG: CPT | Mod: CPTII,S$GLB,, | Performed by: CLINICAL NURSE SPECIALIST

## 2022-03-17 PROCEDURE — 99999 PR PBB SHADOW E&M-EST. PATIENT-LVL IV: ICD-10-PCS | Mod: PBBFAC,,, | Performed by: CLINICAL NURSE SPECIALIST

## 2022-03-17 PROCEDURE — 3075F PR MOST RECENT SYSTOLIC BLOOD PRESS GE 130-139MM HG: ICD-10-PCS | Mod: CPTII,S$GLB,, | Performed by: CLINICAL NURSE SPECIALIST

## 2022-03-17 PROCEDURE — 3079F PR MOST RECENT DIASTOLIC BLOOD PRESSURE 80-89 MM HG: ICD-10-PCS | Mod: CPTII,S$GLB,, | Performed by: CLINICAL NURSE SPECIALIST

## 2022-03-17 RX ORDER — LOSARTAN POTASSIUM AND HYDROCHLOROTHIAZIDE 25; 100 MG/1; MG/1
1 TABLET ORAL DAILY
COMMUNITY
Start: 2022-03-10

## 2022-03-17 RX ORDER — CEPHALEXIN 500 MG/1
500 CAPSULE ORAL 4 TIMES DAILY
COMMUNITY
Start: 2022-03-10 | End: 2022-08-15

## 2022-03-17 RX ORDER — CETIRIZINE HYDROCHLORIDE 10 MG/1
10 TABLET ORAL DAILY
COMMUNITY
Start: 2022-03-10

## 2022-03-17 RX ORDER — MEDROXYPROGESTERONE ACETATE 150 MG/ML
INJECTION, SUSPENSION INTRAMUSCULAR
COMMUNITY
Start: 2022-03-10 | End: 2023-04-18

## 2022-03-17 RX ORDER — BACLOFEN 20 MG/1
20 TABLET ORAL NIGHTLY
Qty: 30 TABLET | Refills: 2 | Status: SHIPPED | OUTPATIENT
Start: 2022-03-17 | End: 2022-12-13 | Stop reason: SDUPTHER

## 2022-03-17 RX ORDER — FERROUS SULFATE 325(65) MG
TABLET ORAL 2 TIMES DAILY
COMMUNITY
Start: 2021-11-24

## 2022-03-17 RX ORDER — ACETAMINOPHEN 325 MG/1
650 TABLET ORAL EVERY 6 HOURS PRN
COMMUNITY

## 2022-03-17 RX ORDER — ESOMEPRAZOLE MAGNESIUM 40 MG/1
40 CAPSULE, DELAYED RELEASE ORAL DAILY
COMMUNITY
Start: 2022-03-10

## 2022-03-17 RX ORDER — FLUTICASONE PROPIONATE 50 MCG
SPRAY, SUSPENSION (ML) NASAL
COMMUNITY
Start: 2022-03-10

## 2022-03-17 RX ORDER — DIAZEPAM 5 MG/1
TABLET ORAL
Qty: 2 TABLET | Refills: 0 | Status: SHIPPED | OUTPATIENT
Start: 2022-03-17 | End: 2023-01-27

## 2022-03-17 NOTE — PROGRESS NOTES
Subjective:          Patient ID: Vik May is a 46 y.o. female who presents today for a routine clinic visit for MS.  She was last seen in August 2022. The history has been provided by the patient.     MS HPI:  · DMT: ocrelizumab due now   · Side effects from DMT? No  · Taking vitamin D3 as recommended? Yes -  Dose: 4000 units daily   Fatigue is her biggest issue. She does not sleep well at night. She has trouble falling asleep and staying asleep. If she wakes up in the middle of the night, she has trouble getting back to sleep. She thinks she gets less than 5 hours of sleep per night. Her  says she snores. On her off days, she ends up staying in bed a little longer. She does not nap when she gets home from work, but she tries to get in bed early.   She denies any other signs of relapse or worsening.   She can tell when she gets close to her infusion. Her walking gets a little worse. Her legs drag. She has had a few falls due to loss of balance. If she falls on the floor, she typically needs help to get up.   She works long hours--12 hours 5 days a week. She sits a lot at work.   She tries to exercise when she gets home from work.   She just started taking medication for high blood pressure.   She is interested in applying for disability again. She was denied a few years ago.     Medications:  Current Outpatient Medications   Medication Sig    aspirin-caffeine 845-65 mg PwPk Take by mouth.    baclofen (LIORESAL) 10 MG tablet Take 1 tablet (10 mg total) by mouth every evening.    cholecalciferol, vitamin D3, (VITAMIN D3) 2,000 unit Cap Take 2 tablets by mouth.    ferrous sulfate 325 (65 FE) MG EC tablet Take 325 mg by mouth 3 (three) times daily with meals.    losartan-hydrochlorothiazide 50-12.5 mg (HYZAAR) 50-12.5 mg per tablet Take 1 tablet by mouth once daily.    norethindrone-e.estradiol-iron (MINASTRIN 24 FE) 1 mg-20 mcg(24) /75 mg (4) Chew     tolterodine (DETROL LA) 2 MG Cp24 Take 1 capsule (2  mg total) by mouth once daily.       SOCIAL HISTORY  Social History     Tobacco Use    Smoking status: Current Every Day Smoker    Smokeless tobacco: Never Used    Tobacco comment: Pt states she vapes   Substance Use Topics    Alcohol use: Yes    Drug use: Not Currently       Living arrangements - the patient lives with their family.    ROS:  REVIEW OF SYMPTOMS 3/17/2022   Do you feel abnormally tired on most days? Yes   Do you feel you generally sleep well? No   Do you have difficulty controlling your bladder?  Yes--she has some bladder urgency and some occasional incontinence. She does wear a pad. She thinks she gets UTIs around her cycle. She tries to drink cranberry juice.    Do you have difficulty controlling your bowels?  Yes--bowel urgency and occasional incontinence; averages about 3 bowel/bladder accidents per month    Do you have frequent muscle cramps, tightness or spasms in your limbs   Yes--she has cramps in her legs, mostly at night. She takes one baclofen at bedtime.    Do you have new visual symptoms?  No   Do you have worsening difficulty with your memory or thinking? Yes--loses her train of thought; she eventually remembers, but it takes time     Do you have worsening symptoms of anxiety or depression?  Yes--she has felt down for the past few weeks. She thinks she is overwhelmed   For patients who walk, Do you have more difficulty walking?  Yes   Have you fallen since your last visit?  Yes   For patients who use wheelchairs: Do you have any skin wounds or breakdown? Not Applicable   Do you have difficulty using your hands?  No   Do you have shooting or burning pain? No   Do you have difficulty with sexual function?  Yes   If you are sexually active, are you using birth control? Y/N  N/A Yes   Do you often choke when swallowing liquids or solid food?  Yes--sometimes food gets caught in her throat    Do you experience worsening symptoms when overheated? No--feels more sensitive to the heat; she  is always cold    Do you need any new equipment such as a wheelchair, walker or shower chair? No   Do you receive co-pay financial assistance for your principal MS medicine? Yes   Would you be interested in participating in an MS research trial in the future? No   For patients on Gilenya, Tecfidera, Aubagio, Rituxan, Ocrevus, Tysabri, Lemtrada or Methotrexate, are you aware that you should NOT receive live virus vaccines?  Yes   Do you feel you have adequate family/friend support?  Yes   Do you have health insurance?   Yes   Are you currently employed? Yes   Do you receive SSDI/SSI?  No   Do you use marijuana or cannabis products? No   How often? -   Have you been diagnosed with a urinary tract infection since your last visit here? No   Have you been diagnosed with a respiratory tract infection since your last visit here? No   Have you been to the emergency room since your last visit here? No   Have you been hospitalized since your last visit here?  No              Objective:        1. 25 foot timed walk:  Timed 25 Foot Walk: 4/21/2021 3/17/2022   Did patient wear an AFO? No No   Was assistive device used? No No   Time for 25 Foot Walk (seconds) 9.05 11.6   Time for 25 Foot Walk (seconds) 9.2 12     Neurologic Exam    MENTAL STATUS: intact     CRANIAL NERVE EXAM:  There is no BELIA.  Extraocular muscles are intact.  No facial asymmetry.There is no dysarthria.      MOTOR EXAM: slow RSM bilaterally;  4/5 IO right hand, o/w 5/5 in other UE groups;   Right HF 3/5, KF 4/5, DF 4/5; LLE 4+/5 in all flexor groups     REFLEXES: 3+ and symmetric throughout in all four extremities;      SENSORY EXAM: decreased sensation distal LE in stocking distribution, and right UE     COORDINATION: Normal finger-to-nose exam; heel to shin is difficult     GAIT: wide based and slow; drags right leg; Tandem walking is unsteady. Romberg with mild sway.      Imaging:     Results for orders placed during the hospital encounter of 06/28/21    MRI  Brain Demyelinating Without Contrast    Impression  Stable exam compared to 06/24/2020.  Findings compatible with multiple sclerosis.  No lesions demonstrating diffusion restriction to suggest active demyelination.  Stable plaque burden with no new lesions identified.      Electronically signed by: Jovanny Neal MD  Date:    06/28/2021  Time:    08:34          Labs:     Lab Results   Component Value Date    ANBEUXOH39UI 37 10/16/2019     Lab Results   Component Value Date    JCVINDEX 2.38 (A) 10/16/2019    JCVANTIBODY Positive (A) 10/16/2019     Lab Results   Component Value Date    OZ5XDQRL 70.4 01/06/2020    ABSOLUTECD3 1843.0 01/06/2020    XG4KFLXE 15.0 01/06/2020    ABSOLUTECD8 393.0 01/06/2020    VI1JRVVI 55.5 01/06/2020    ABSOLUTECD4 1453.0 (H) 01/06/2020    LABCD48 3.7 (H) 01/06/2020     Lab Results   Component Value Date    WBC 15.07 (H) 02/22/2022    RBC 5.38 02/22/2022    HGB 12.4 02/22/2022    HCT 40.2 02/22/2022    MCV 75 (L) 02/22/2022    MCH 23.0 (L) 02/22/2022    MCHC 30.8 (L) 02/22/2022    RDW 19.1 (H) 02/22/2022     02/22/2022    MPV 9.8 02/22/2022    GRAN 11.4 (H) 02/22/2022    GRAN 75.4 (H) 02/22/2022    LYMPH 2.6 02/22/2022    LYMPH 17.5 (L) 02/22/2022    MONO 0.9 02/22/2022    MONO 5.8 02/22/2022    EOS 0.1 02/22/2022    BASO 0.08 02/22/2022    EOSINOPHIL 0.5 02/22/2022    BASOPHIL 0.5 02/22/2022     Sodium   Date Value Ref Range Status   01/09/2020 140 136 - 145 mmol/L Final     Potassium   Date Value Ref Range Status   01/09/2020 4.2 3.5 - 5.1 mmol/L Final     Chloride   Date Value Ref Range Status   01/09/2020 107 95 - 110 mmol/L Final     CO2   Date Value Ref Range Status   01/09/2020 27 23 - 29 mmol/L Final     Glucose   Date Value Ref Range Status   01/09/2020 79 70 - 110 mg/dL Final     BUN   Date Value Ref Range Status   01/09/2020 11 7 - 17 mg/dL Final     Creatinine   Date Value Ref Range Status   01/09/2020 0.62 0.50 - 1.40 mg/dL Final     Calcium   Date Value Ref Range Status    01/09/2020 9.2 8.7 - 10.5 mg/dL Final     Total Protein   Date Value Ref Range Status   01/09/2020 7.3 6.0 - 8.4 g/dL Final     Albumin   Date Value Ref Range Status   01/09/2020 4.0 3.5 - 5.2 g/dL Final     Total Bilirubin   Date Value Ref Range Status   01/09/2020 0.3 0.1 - 1.0 mg/dL Final     Comment:     For infants and newborns, interpretation of results should be based  on gestational age, weight and in agreement with clinical  observations.  Premature Infant recommended reference ranges:  Up to 24 hours.............<8.0 mg/dL  Up to 48 hours............<12.0 mg/dL  3-5 days..................<15.0 mg/dL  6-29 days.................<15.0 mg/dL       Alkaline Phosphatase   Date Value Ref Range Status   01/09/2020 60 38 - 126 U/L Final     AST   Date Value Ref Range Status   01/09/2020 19 15 - 46 U/L Final     ALT   Date Value Ref Range Status   01/09/2020 13 10 - 44 U/L Final     Anion Gap   Date Value Ref Range Status   01/09/2020 6 (L) 8 - 16 mmol/L Final     eGFR if    Date Value Ref Range Status   01/09/2020 >60.0 >60 mL/min/1.73 m^2 Final     eGFR if non    Date Value Ref Range Status   01/09/2020 >60.0 >60 mL/min/1.73 m^2 Final     Comment:     Calculation used to obtain the estimated glomerular filtration  rate (eGFR) is the CKD-EPI equation.        Lab Results   Component Value Date    HEPBSAG Negative 02/22/2022    HEPBSAB Grayzone 02/22/2022    HEPBCAB Negative 02/22/2022           MS Impression and Plan:     NEURO MULTIPLE SCLEROSIS IMPRESSION:   MS Status:     Number of relapses in the past year?:  0    Clinical Progression:  Worsened    Clinical Progression comment:  Walk time is worse, but the remainder of the exam is mostly stable. I suspect that her walk is worse because she is due for her infusion. I would like for her to come back to clinic in the middle of her infusion cycle to see if there is an overall difference in her exam.   Plan:     DMT:  No change in  management    DMT comment:  Continue Ocrevus and Vitamin D. Her infusion is due now, and we will work on getting her scheduled. She is aware of the risks associated with immunosuppressant therapy, including increased risk of infection.       Symptom Management:  Implement change in symptom management    Implement Change in Symptom Management:  Spasticity (Baclofen increased to 20mg at bedtime. )     Info sent on absorbent protective undergarments to patient through portal.   Link to psychology today provided to patient so she can find a local counselor.     MRIs planned for June. We will get brain and spine imaging this year. Valium sent to pharmacy to take prior to MRI.     She will follow up with Dr. Maya after her MRIs.     Total time spent with patient: 43 minutes   Total time spent on encounter: 55 minutes     Problem List Items Addressed This Visit    None     Visit Diagnoses     Multiple sclerosis    -  Primary    Relevant Medications    baclofen (LIORESAL) 20 MG tablet    Other Relevant Orders    MRI Brain Demyelinating W W/O Contrast    MRI Cervical Spine Demyelinating W W/O Contrast    MRI Thoracic Spine Demyelinating W W/O Contrast    Gait disturbance        Counseling regarding goals of care        Prophylactic immunotherapy        High risk medication use        Muscle spasm        Relevant Medications    baclofen (LIORESAL) 20 MG tablet          Daisy Moura, APRN, CNS

## 2022-03-27 ENCOUNTER — LAB VISIT (OUTPATIENT)
Dept: URGENT CARE | Facility: CLINIC | Age: 47
End: 2022-03-27
Payer: COMMERCIAL

## 2022-03-27 DIAGNOSIS — Z13.9 SCREENING FOR CONDITION: ICD-10-CM

## 2022-03-27 PROCEDURE — U0003 INFECTIOUS AGENT DETECTION BY NUCLEIC ACID (DNA OR RNA); SEVERE ACUTE RESPIRATORY SYNDROME CORONAVIRUS 2 (SARS-COV-2) (CORONAVIRUS DISEASE [COVID-19]), AMPLIFIED PROBE TECHNIQUE, MAKING USE OF HIGH THROUGHPUT TECHNOLOGIES AS DESCRIBED BY CMS-2020-01-R: HCPCS | Performed by: CLINICAL NURSE SPECIALIST

## 2022-03-27 PROCEDURE — U0005 INFEC AGEN DETEC AMPLI PROBE: HCPCS | Performed by: CLINICAL NURSE SPECIALIST

## 2022-03-28 LAB
SARS-COV-2 RNA RESP QL NAA+PROBE: NOT DETECTED
SARS-COV-2- CYCLE NUMBER: NORMAL

## 2022-06-24 ENCOUNTER — PATIENT MESSAGE (OUTPATIENT)
Dept: PSYCHIATRY | Facility: CLINIC | Age: 47
End: 2022-06-24
Payer: COMMERCIAL

## 2022-07-08 ENCOUNTER — HOSPITAL ENCOUNTER (OUTPATIENT)
Dept: RADIOLOGY | Facility: HOSPITAL | Age: 47
Discharge: HOME | End: 2022-07-08
Attending: CLINICAL NURSE SPECIALIST
Payer: COMMERCIAL

## 2022-07-08 ENCOUNTER — PATIENT MESSAGE (OUTPATIENT)
Dept: NEUROLOGY | Facility: CLINIC | Age: 47
End: 2022-07-08
Payer: COMMERCIAL

## 2022-07-08 DIAGNOSIS — G35 MULTIPLE SCLEROSIS: Primary | ICD-10-CM

## 2022-07-08 DIAGNOSIS — R26.9 GAIT DISTURBANCE: ICD-10-CM

## 2022-07-08 DIAGNOSIS — G35 MULTIPLE SCLEROSIS: ICD-10-CM

## 2022-07-08 PROCEDURE — 72157 MRI CHEST SPINE W/O & W/DYE: CPT | Mod: TC

## 2022-07-08 PROCEDURE — 72156 MRI NECK SPINE W/O & W/DYE: CPT | Mod: TC

## 2022-07-08 PROCEDURE — 72156 MRI CERVICAL SPINE DEMYELINATING W W/O CONTRAST: ICD-10-PCS | Mod: 26,,, | Performed by: RADIOLOGY

## 2022-07-08 PROCEDURE — 72156 MRI NECK SPINE W/O & W/DYE: CPT | Mod: 26,,, | Performed by: RADIOLOGY

## 2022-07-08 PROCEDURE — 72157 MRI CHEST SPINE W/O & W/DYE: CPT | Mod: 26,,, | Performed by: RADIOLOGY

## 2022-07-08 PROCEDURE — 70553 MRI BRAIN STEM W/O & W/DYE: CPT | Mod: 26,,, | Performed by: RADIOLOGY

## 2022-07-08 PROCEDURE — 72157 MRI THORACIC SPINE DEMYELINATING W W/O CONTRAST: ICD-10-PCS | Mod: 26,,, | Performed by: RADIOLOGY

## 2022-07-08 PROCEDURE — 25500020 PHARM REV CODE 255: Performed by: CLINICAL NURSE SPECIALIST

## 2022-07-08 PROCEDURE — A9585 GADOBUTROL INJECTION: HCPCS | Performed by: CLINICAL NURSE SPECIALIST

## 2022-07-08 PROCEDURE — 70553 MRI BRAIN DEMYELINATING W/ WO CONTRAST: ICD-10-PCS | Mod: 26,,, | Performed by: RADIOLOGY

## 2022-07-08 PROCEDURE — 70553 MRI BRAIN STEM W/O & W/DYE: CPT | Mod: TC

## 2022-07-08 RX ORDER — GADOBUTROL 604.72 MG/ML
10 INJECTION INTRAVENOUS
Status: COMPLETED | OUTPATIENT
Start: 2022-07-08 | End: 2022-07-08

## 2022-07-08 RX ADMIN — GADOBUTROL 10 ML: 604.72 INJECTION INTRAVENOUS at 12:07

## 2022-07-18 ENCOUNTER — DOCUMENTATION ONLY (OUTPATIENT)
Dept: NEUROLOGY | Facility: CLINIC | Age: 47
End: 2022-07-18
Payer: COMMERCIAL

## 2022-08-11 ENCOUNTER — TELEPHONE (OUTPATIENT)
Dept: NEUROLOGY | Facility: CLINIC | Age: 47
End: 2022-08-11
Payer: COMMERCIAL

## 2022-08-11 NOTE — TELEPHONE ENCOUNTER
----- Message from Destinee North sent at 8/11/2022 11:09 AM CDT -----  Contact: Tato [phone]  Tato with [phone] requesting call back re: caller states that the orders they received for outpatient PT had a nurses signature on it and they need the MD's signature.     Confirmed contact below:  Contact Name:Tato  Phone Number: [phone]  Fax Number: [phone]

## 2022-08-15 ENCOUNTER — TELEPHONE (OUTPATIENT)
Dept: PHYSICAL MEDICINE AND REHAB | Facility: CLINIC | Age: 47
End: 2022-08-15
Payer: COMMERCIAL

## 2022-08-15 ENCOUNTER — OFFICE VISIT (OUTPATIENT)
Dept: NEUROLOGY | Facility: CLINIC | Age: 47
End: 2022-08-15
Payer: COMMERCIAL

## 2022-08-15 ENCOUNTER — LAB VISIT (OUTPATIENT)
Dept: LAB | Facility: HOSPITAL | Age: 47
End: 2022-08-15
Attending: PSYCHIATRY & NEUROLOGY
Payer: COMMERCIAL

## 2022-08-15 VITALS
SYSTOLIC BLOOD PRESSURE: 114 MMHG | DIASTOLIC BLOOD PRESSURE: 76 MMHG | WEIGHT: 213.5 LBS | HEART RATE: 102 BPM | HEIGHT: 64 IN | BODY MASS INDEX: 36.45 KG/M2

## 2022-08-15 DIAGNOSIS — R26.9 GAIT DISTURBANCE: ICD-10-CM

## 2022-08-15 DIAGNOSIS — G35 MS (MULTIPLE SCLEROSIS): Primary | ICD-10-CM

## 2022-08-15 DIAGNOSIS — G47.33 OSA (OBSTRUCTIVE SLEEP APNEA): ICD-10-CM

## 2022-08-15 DIAGNOSIS — G47.00 INSOMNIA, UNSPECIFIED TYPE: ICD-10-CM

## 2022-08-15 DIAGNOSIS — R29.6 FREQUENT FALLS: ICD-10-CM

## 2022-08-15 DIAGNOSIS — G35 MS (MULTIPLE SCLEROSIS): ICD-10-CM

## 2022-08-15 DIAGNOSIS — N31.9 NEUROGENIC BLADDER: ICD-10-CM

## 2022-08-15 LAB
BASOPHILS # BLD AUTO: 0.08 K/UL (ref 0–0.2)
BASOPHILS NFR BLD: 0.5 % (ref 0–1.9)
DIFFERENTIAL METHOD: ABNORMAL
EOSINOPHIL # BLD AUTO: 0.1 K/UL (ref 0–0.5)
EOSINOPHIL NFR BLD: 0.5 % (ref 0–8)
ERYTHROCYTE [DISTWIDTH] IN BLOOD BY AUTOMATED COUNT: 17.2 % (ref 11.5–14.5)
HCT VFR BLD AUTO: 36.4 % (ref 37–48.5)
HGB BLD-MCNC: 11.8 G/DL (ref 12–16)
IGA SERPL-MCNC: 117 MG/DL (ref 40–350)
IGG SERPL-MCNC: 1054 MG/DL (ref 650–1600)
IGM SERPL-MCNC: 65 MG/DL (ref 50–300)
IMM GRANULOCYTES # BLD AUTO: 0.05 K/UL (ref 0–0.04)
IMM GRANULOCYTES NFR BLD AUTO: 0.3 % (ref 0–0.5)
LYMPHOCYTES # BLD AUTO: 3 K/UL (ref 1–4.8)
LYMPHOCYTES NFR BLD: 20 % (ref 18–48)
MCH RBC QN AUTO: 22.9 PG (ref 27–31)
MCHC RBC AUTO-ENTMCNC: 32.4 G/DL (ref 32–36)
MCV RBC AUTO: 71 FL (ref 82–98)
MONOCYTES # BLD AUTO: 1 K/UL (ref 0.3–1)
MONOCYTES NFR BLD: 6.8 % (ref 4–15)
NEUTROPHILS # BLD AUTO: 10.8 K/UL (ref 1.8–7.7)
NEUTROPHILS NFR BLD: 71.9 % (ref 38–73)
NRBC BLD-RTO: 0 /100 WBC
PLATELET # BLD AUTO: 382 K/UL (ref 150–450)
PMV BLD AUTO: 9.9 FL (ref 9.2–12.9)
RBC # BLD AUTO: 5.15 M/UL (ref 4–5.4)
WBC # BLD AUTO: 14.97 K/UL (ref 3.9–12.7)

## 2022-08-15 PROCEDURE — 99999 PR PBB SHADOW E&M-EST. PATIENT-LVL V: CPT | Mod: PBBFAC,,, | Performed by: PSYCHIATRY & NEUROLOGY

## 2022-08-15 PROCEDURE — 3078F PR MOST RECENT DIASTOLIC BLOOD PRESSURE < 80 MM HG: ICD-10-PCS | Mod: CPTII,S$GLB,, | Performed by: PSYCHIATRY & NEUROLOGY

## 2022-08-15 PROCEDURE — 87340 HEPATITIS B SURFACE AG IA: CPT | Performed by: PSYCHIATRY & NEUROLOGY

## 2022-08-15 PROCEDURE — 3078F DIAST BP <80 MM HG: CPT | Mod: CPTII,S$GLB,, | Performed by: PSYCHIATRY & NEUROLOGY

## 2022-08-15 PROCEDURE — 99999 PR PBB SHADOW E&M-EST. PATIENT-LVL V: ICD-10-PCS | Mod: PBBFAC,,, | Performed by: PSYCHIATRY & NEUROLOGY

## 2022-08-15 PROCEDURE — 3008F PR BODY MASS INDEX (BMI) DOCUMENTED: ICD-10-PCS | Mod: CPTII,S$GLB,, | Performed by: PSYCHIATRY & NEUROLOGY

## 2022-08-15 PROCEDURE — 3074F PR MOST RECENT SYSTOLIC BLOOD PRESSURE < 130 MM HG: ICD-10-PCS | Mod: CPTII,S$GLB,, | Performed by: PSYCHIATRY & NEUROLOGY

## 2022-08-15 PROCEDURE — 82784 ASSAY IGA/IGD/IGG/IGM EACH: CPT | Performed by: PSYCHIATRY & NEUROLOGY

## 2022-08-15 PROCEDURE — 36415 COLL VENOUS BLD VENIPUNCTURE: CPT | Performed by: PSYCHIATRY & NEUROLOGY

## 2022-08-15 PROCEDURE — 86704 HEP B CORE ANTIBODY TOTAL: CPT | Performed by: PSYCHIATRY & NEUROLOGY

## 2022-08-15 PROCEDURE — 99215 PR OFFICE/OUTPT VISIT, EST, LEVL V, 40-54 MIN: ICD-10-PCS | Mod: S$GLB,,, | Performed by: PSYCHIATRY & NEUROLOGY

## 2022-08-15 PROCEDURE — 3008F BODY MASS INDEX DOCD: CPT | Mod: CPTII,S$GLB,, | Performed by: PSYCHIATRY & NEUROLOGY

## 2022-08-15 PROCEDURE — 99215 OFFICE O/P EST HI 40 MIN: CPT | Mod: S$GLB,,, | Performed by: PSYCHIATRY & NEUROLOGY

## 2022-08-15 PROCEDURE — 85025 COMPLETE CBC W/AUTO DIFF WBC: CPT | Performed by: PSYCHIATRY & NEUROLOGY

## 2022-08-15 PROCEDURE — 86706 HEP B SURFACE ANTIBODY: CPT | Performed by: PSYCHIATRY & NEUROLOGY

## 2022-08-15 PROCEDURE — 1159F MED LIST DOCD IN RCRD: CPT | Mod: CPTII,S$GLB,, | Performed by: PSYCHIATRY & NEUROLOGY

## 2022-08-15 PROCEDURE — 3074F SYST BP LT 130 MM HG: CPT | Mod: CPTII,S$GLB,, | Performed by: PSYCHIATRY & NEUROLOGY

## 2022-08-15 PROCEDURE — 1159F PR MEDICATION LIST DOCUMENTED IN MEDICAL RECORD: ICD-10-PCS | Mod: CPTII,S$GLB,, | Performed by: PSYCHIATRY & NEUROLOGY

## 2022-08-15 RX ORDER — TRAMADOL HYDROCHLORIDE 50 MG/1
TABLET ORAL
COMMUNITY
Start: 2022-03-10 | End: 2023-04-18

## 2022-08-15 RX ORDER — TRAZODONE HYDROCHLORIDE 50 MG/1
50 TABLET ORAL NIGHTLY
Qty: 30 TABLET | Refills: 11 | Status: SHIPPED | OUTPATIENT
Start: 2022-08-15 | End: 2024-01-07 | Stop reason: SDUPTHER

## 2022-08-15 NOTE — TELEPHONE ENCOUNTER
----- Message from Juvenal Allen MA sent at 8/15/2022 12:31 PM CDT -----  Patient needs an appt per  request.

## 2022-08-15 NOTE — PROGRESS NOTES
"Subjective:          Patient ID: Vik May is a 46 y.o. female who presents today for a routine clinic visit for MS.      MS HPI:  · DMT: ocrelizumab- end of March / end of September  · Side effects from DMT? No  · Taking vitamin D3 as recommended? Yes -  4,000 IU/day   · About to start PT  --in [facility]   · Walking is getting worse   · Continues to feel a "wearing off" effect at end of Ocrevus interval   · Has not had COVID vaccine --states "It came out too fast".  Remains vaccine hesitant   · States she falls about 1-2 times /week   · Does not use a cane or a walker   · Continues to work full time as a  at [employer].  She works mostly as a [employer] .   She states "I like what I do, but I'm tired".   · Has 3 kids, 13, 19, 29;   · Pt wants a scooter. States she needs it to help with housework and for when she leave house as well.   · Wearing depends now.   · On Depo-provera every 3 mo     Medications:  Current Outpatient Medications   Medication Sig    acetaminophen (TYLENOL) 325 MG tablet Take 650 mg by mouth every 6 (six) hours as needed.    aspirin-caffeine 845-65 mg PwPk Take by mouth.    baclofen (LIORESAL) 20 MG tablet Take 1 tablet (20 mg total) by mouth every evening.    cephALEXin (KEFLEX) 500 MG capsule Take 500 mg by mouth 4 (four) times daily.    cetirizine (ZYRTEC) 10 MG tablet Take 10 mg by mouth once daily.    cholecalciferol, vitamin D3, (VITAMIN D3) 2,000 unit Cap Take 2 tablets by mouth.    diazePAM (VALIUM) 5 MG tablet Take 1 tablet by mouth 1 hour prior to MRI and 1 tablet at the time of the MRI.    esomeprazole (NEXIUM) 40 MG capsule Take 40 mg by mouth once daily.    ferrous sulfate (FEOSOL) 325 mg (65 mg iron) Tab tablet Take by mouth 2 (two) times daily.    fluticasone propionate (FLONASE) 50 mcg/actuation nasal spray by Each Nostril route.    losartan-hydrochlorothiazide 100-25 mg (HYZAAR) 100-25 mg per tablet Take 1 tablet " by mouth once daily.    losartan-hydrochlorothiazide 50-12.5 mg (HYZAAR) 50-12.5 mg per tablet Take 1 tablet by mouth once daily.    medroxyPROGESTERone (DEPO-PROVERA) 150 mg/mL Syrg     norethindrone-e.estradiol-iron (MINASTRIN 24 FE) 1 mg-20 mcg(24) /75 mg (4) Chew        SOCIAL HISTORY  Social History     Tobacco Use    Smoking status: Current Every Day Smoker    Smokeless tobacco: Never Used    Tobacco comment: Pt states she vapes   Substance Use Topics    Alcohol use: Yes    Drug use: Not Currently       Living arrangements - the patient lives with their family.  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 Yes   Do you feel you generally sleep well? No-- light sleeper; has insomnia    Do you have difficulty controlling your bladder?  Yes   Do you have difficulty controlling your bowels?  Yes   Do you have frequent muscle cramps, tightness or spasms in your limbs?  Yes--much better with hs baclofen    Do you have new visual symptoms?  No   Do you have worsening difficulty with your memory or thinking? No   Do you have worsening symptoms of anxiety or depression?  No   For patients who walk, Do you have more difficulty walking?  Yes   Have you fallen since your last visit?  Yes--frequent   For patients who use wheelchairs: Do you have any skin wounds or breakdown? No   Do you have difficulty using your hands?  No   Do you have shooting or burning pain? No   Do you have difficulty with sexual function?  Yes   If you are sexually active, are you using birth control? Y/N  N/A Yes   Do you often choke when swallowing liquids or solid food?  Yes   Do you experience worsening symptoms when overheated? Yes   Do you need any new equipment such as a wheelchair, walker or shower chair? No   Do you receive co-pay financial assistance for your principal MS medicine? Yes   Would you be interested in  participating in an MS research trial in the future? No   For patients on Gilenya, Tecfidera, Aubagio, Rituxan, Ocrevus, Tysabri, Lemtrada or Methotrexate, are you aware that you should NOT receive live virus vaccines?  Yes   Do you feel you have adequate family/friend support?  Yes   Do you have health insurance?   Yes   Are you currently employed? Yes   Do you receive SSDI/SSI?  Not Applicable   Do you use marijuana or cannabis products? No   How often? -   Have you been diagnosed with a urinary tract infection since your last visit here? Yes--more often   Have you been diagnosed with a respiratory tract infection since your last visit here? No   Have you been to the emergency room since your last visit here? No   Have you been hospitalized since your last visit here?  No              Objective:      Timed 25 Foot Walk: 3/17/2022 8/15/2022   Did patient wear an AFO? No No   Was assistive device used? No Yes   Assistive device used (víctor one): - Bilateral Assistance   Bilateral device used - Walker/Rollator   Time for 25 Foot Walk (seconds) 11.6 12.5   Time for 25 Foot Walk (seconds) 12 -       Neurologic Exam    MENTAL STATUS: intact     CRANIAL NERVE EXAM:  There is no BELIA.  Extraocular muscles are intact.  No facial asymmetry.There is no dysarthria.      MOTOR EXAM: slow RSM bilaterally;  4/5 IO right hand, o/w 5/5 in other UE groups;   Right HF 3/5, KF 4/5, DF 4/5; LLE 4+/5 in all flexor groups     REFLEXES: 3+ and symmetric throughout in all four extremeties;      SENSORY EXAM: decreased sensation distal LE in stocking distribution, and right UE     COORDINATION: Normal finger-to-nose exam      GAIT: wide based and slow; drags right leg;     Imaging:     Results for orders placed during the hospital encounter of 06/28/21    MRI Brain Demyelinating Without Contrast    Impression  Stable exam compared to 06/24/2020.  Findings compatible with multiple sclerosis.  No lesions demonstrating diffusion restriction to  suggest active demyelination.  Stable plaque burden with no new lesions identified.      Electronically signed by: Jovanny Neal MD  Date:    06/28/2021  Time:    08:34    No results found for this or any previous visit.    No results found for this or any previous visit.    Results for orders placed during the hospital encounter of 07/08/22    MRI Brain Demyelinating W W/O Contrast    Impression  Brain and cervical spine appear stable from prior exam, again demonstrating findings compatible with the reported history of multiple sclerosis.  No new or enhancing lesions in these regions to indicate ongoing or active demyelination.    Several additional small nonenhancing demyelinating lesions within the thoracic spinal cord.  No prior studies available for comparison to assess for stability.    Mild cervical and thoracic spondylosis as above.    Cholelithiasis.    Electronically signed by resident: Travon Mckeon  Date:    07/08/2022  Time:    13:12    Electronically signed by: Jl Watters MD  Date:    07/08/2022  Time:    14:31    Results for orders placed during the hospital encounter of 07/08/22    MRI Cervical Spine Demyelinating W W/O Contrast    Impression  Brain and cervical spine appear stable from prior exam, again demonstrating findings compatible with the reported history of multiple sclerosis.  No new or enhancing lesions in these regions to indicate ongoing or active demyelination.    Several additional small nonenhancing demyelinating lesions within the thoracic spinal cord.  No prior studies available for comparison to assess for stability.    Mild cervical and thoracic spondylosis as above.    Cholelithiasis.    Electronically signed by resident: Travon Mckeon  Date:    07/08/2022  Time:    13:12    Electronically signed by: Jl Watters MD  Date:    07/08/2022  Time:    14:31    Results for orders placed during the hospital encounter of 07/08/22    MRI Thoracic Spine Demyelinating W W/O  Contrast    Impression  Brain and cervical spine appear stable from prior exam, again demonstrating findings compatible with the reported history of multiple sclerosis.  No new or enhancing lesions in these regions to indicate ongoing or active demyelination.    Several additional small nonenhancing demyelinating lesions within the thoracic spinal cord.  No prior studies available for comparison to assess for stability.    Mild cervical and thoracic spondylosis as above.    Cholelithiasis.    Electronically signed by resident: Travon Mckeon  Date:    07/08/2022  Time:    13:12    Electronically signed by: Jl Watters MD  Date:    07/08/2022  Time:    14:31        Labs:     Lab Results   Component Value Date    ZVCKKYCB56UK 37 10/16/2019     Lab Results   Component Value Date    JCVINDEX 2.38 (A) 10/16/2019    JCVANTIBODY Positive (A) 10/16/2019     Lab Results   Component Value Date    XQ1BGCBG 70.4 01/06/2020    ABSOLUTECD3 1843.0 01/06/2020    LO5VSXIH 15.0 01/06/2020    ABSOLUTECD8 393.0 01/06/2020    AE2FWDOS 55.5 01/06/2020    ABSOLUTECD4 1453.0 (H) 01/06/2020    LABCD48 3.7 (H) 01/06/2020     Lab Results   Component Value Date    WBC 15.07 (H) 02/22/2022    RBC 5.38 02/22/2022    HGB 12.4 02/22/2022    HCT 40.2 02/22/2022    MCV 75 (L) 02/22/2022    MCH 23.0 (L) 02/22/2022    MCHC 30.8 (L) 02/22/2022    RDW 19.1 (H) 02/22/2022     02/22/2022    MPV 9.8 02/22/2022    GRAN 11.4 (H) 02/22/2022    GRAN 75.4 (H) 02/22/2022    LYMPH 2.6 02/22/2022    LYMPH 17.5 (L) 02/22/2022    MONO 0.9 02/22/2022    MONO 5.8 02/22/2022    EOS 0.1 02/22/2022    BASO 0.08 02/22/2022    EOSINOPHIL 0.5 02/22/2022    BASOPHIL 0.5 02/22/2022     Sodium   Date Value Ref Range Status   01/09/2020 140 136 - 145 mmol/L Final     Potassium   Date Value Ref Range Status   01/09/2020 4.2 3.5 - 5.1 mmol/L Final     Chloride   Date Value Ref Range Status   01/09/2020 107 95 - 110 mmol/L Final     CO2   Date Value Ref Range Status    01/09/2020 27 23 - 29 mmol/L Final     Glucose   Date Value Ref Range Status   01/09/2020 79 70 - 110 mg/dL Final     BUN   Date Value Ref Range Status   01/09/2020 11 7 - 17 mg/dL Final     Creatinine   Date Value Ref Range Status   01/09/2020 0.62 0.50 - 1.40 mg/dL Final     Calcium   Date Value Ref Range Status   01/09/2020 9.2 8.7 - 10.5 mg/dL Final     Total Protein   Date Value Ref Range Status   01/09/2020 7.3 6.0 - 8.4 g/dL Final     Albumin   Date Value Ref Range Status   01/09/2020 4.0 3.5 - 5.2 g/dL Final     Total Bilirubin   Date Value Ref Range Status   01/09/2020 0.3 0.1 - 1.0 mg/dL Final     Comment:     For infants and newborns, interpretation of results should be based  on gestational age, weight and in agreement with clinical  observations.  Premature Infant recommended reference ranges:  Up to 24 hours.............<8.0 mg/dL  Up to 48 hours............<12.0 mg/dL  3-5 days..................<15.0 mg/dL  6-29 days.................<15.0 mg/dL       Alkaline Phosphatase   Date Value Ref Range Status   01/09/2020 60 38 - 126 U/L Final     AST   Date Value Ref Range Status   01/09/2020 19 15 - 46 U/L Final     ALT   Date Value Ref Range Status   01/09/2020 13 10 - 44 U/L Final     Anion Gap   Date Value Ref Range Status   01/09/2020 6 (L) 8 - 16 mmol/L Final     eGFR if    Date Value Ref Range Status   01/09/2020 >60.0 >60 mL/min/1.73 m^2 Final     eGFR if non    Date Value Ref Range Status   01/09/2020 >60.0 >60 mL/min/1.73 m^2 Final     Comment:     Calculation used to obtain the estimated glomerular filtration  rate (eGFR) is the CKD-EPI equation.        Lab Results   Component Value Date    HEPBSAG Negative 02/22/2022    HEPBSAB Grayzone 02/22/2022    HEPBCAB Negative 02/22/2022           MS Impression and Plan:     NEURO MULTIPLE SCLEROSIS IMPRESSION:   MS Status:     Number of relapses in the past year?:  0    Clinical Progression:  Worsened    Type:   Progressive    MRI Progression:  Stable  Plan:     DMT:  No change in management    Symptom Management:  Implement change in symptom management    Implement Change in Symptom Management:  Bladder, Sleep, Gait, Adaptive Needs and Case Management (refer to Dr. Rowland to address bladder; refer to sleep med to eval for ? VINOD; start MgGlycinate; start low dose trazodone; refer to our social work team regarding work / disability; refer to Dr. Brannon re: scooter; rollator walker prescribed;  )     Next Imaging Due: 7/15/2023     Next Labs Due: 8/15/2022     Gait continues to slow down; Will follow more closely in clinic.  F/u 4 mo Daisy Moura CNS    Patient requires a rollator for home use because of significant mobility impairments caused by multiple sclerosis that interfere with bathing, toileting, grooming and feeding. The condition prevents patient from completing these MRADLs safely, without significant risk for falls and in a timely manner. Patient's mobility limitations cannot be sufficiently resolved by use of a cane or crutch as the patient is unable to support him/herself safely using these devices.  The patient can safely use the rollator and is willing to use it on a regular basis, which will significantly improve the patient's ability to participate in MRADLs            Problem List Items Addressed This Visit        Unprioritized    MS (multiple sclerosis) - Primary    Relevant Orders    Ambulatory referral/consult to Physical Medicine Rehab    Ambulatory referral/consult to Gynecology    WALKER FOR HOME USE    CBC Auto Differential    Hepatitis B Core Antibody, Total    Hepatitis B Surface Ab, Qualitative    Hepatitis B Surface Antigen    Immunoglobulins (IgG, IgA, IgM) Quantitative      Other Visit Diagnoses     Gait disturbance        Relevant Orders    Ambulatory referral/consult to Physical Medicine Rehab    WALKER FOR HOME USE    Frequent falls        Relevant Orders    Ambulatory referral/consult to  Physical Medicine Rehab    WALKER FOR HOME USE    VINOD (obstructive sleep apnea)        Relevant Orders    Ambulatory referral/consult to Sleep Disorders    Insomnia, unspecified type        Relevant Medications    traZODone (DESYREL) 50 MG tablet    Neurogenic bladder        Relevant Orders    Ambulatory referral/consult to Gynecology          Nayely Maya MD     I spent a total of 40 minutes on the day of the visit.This includes face to face time and non-face to face time preparing to see the patient (eg, review of tests), obtaining and/or reviewing separately obtained history, documenting clinical information in the electronic or other health record, independently interpreting results and communicating results to the patient/family/caregiver, or care coordinator.

## 2022-08-15 NOTE — Clinical Note
"Pt works full time in security (seated mostly);  She has moderate gait disturbance and frequent falls.  She has thought about disability and right now is feeling just very "tired" with her work and child responsibilities.  She is interested in learning more about the pros / cons of filing for SSDI.  Please reach out to pt. thanks"

## 2022-08-17 ENCOUNTER — TELEPHONE (OUTPATIENT)
Dept: NEUROLOGY | Facility: CLINIC | Age: 47
End: 2022-08-17

## 2022-08-17 ENCOUNTER — TELEPHONE (OUTPATIENT)
Dept: PSYCHIATRY | Facility: CLINIC | Age: 47
End: 2022-08-17

## 2022-08-17 NOTE — TELEPHONE ENCOUNTER
----- Message from Nayely Maya MD sent at 8/15/2022 12:17 PM CDT -----  On track for Ocrevus in September -- OUtpt infusion ; labs today

## 2022-08-19 ENCOUNTER — PATIENT MESSAGE (OUTPATIENT)
Dept: PSYCHIATRY | Facility: CLINIC | Age: 47
End: 2022-08-19
Payer: COMMERCIAL

## 2022-08-25 DIAGNOSIS — M21.371 RIGHT FOOT DROP: Primary | ICD-10-CM

## 2022-08-29 ENCOUNTER — DOCUMENTATION ONLY (OUTPATIENT)
Dept: NEUROLOGY | Facility: CLINIC | Age: 47
End: 2022-08-29
Payer: COMMERCIAL

## 2022-08-31 NOTE — TELEPHONE ENCOUNTER
SW intern faxed walker order and progress notes to [facility] at [phone]. Will follow-up with pt regarding SSDI questions via [email or web address].

## 2022-09-09 NOTE — TELEPHONE ENCOUNTER
SW intern phoned pt and left a message. Will discuss waiting for insurance to cover her scooter and finding alternative funds for her walker.

## 2022-09-23 ENCOUNTER — TELEPHONE (OUTPATIENT)
Dept: UROGYNECOLOGY | Facility: CLINIC | Age: 47
End: 2022-09-23
Payer: COMMERCIAL

## 2022-10-02 ENCOUNTER — PATIENT MESSAGE (OUTPATIENT)
Dept: NEUROLOGY | Facility: CLINIC | Age: 47
End: 2022-10-02
Payer: COMMERCIAL

## 2022-10-05 ENCOUNTER — TELEPHONE (OUTPATIENT)
Dept: PSYCHIATRY | Facility: CLINIC | Age: 47
End: 2022-10-05
Payer: COMMERCIAL

## 2022-10-05 NOTE — TELEPHONE ENCOUNTER
Faxed August progress note from Dr. Maya to Александр at [facility] per instructions below.    ----- Message from Susan Moeller RN sent at 10/4/2022  2:23 PM CDT -----  Regarding: FW: pt advice  Contact: Александр @ [phone]    ----- Message -----  From: Darius Tremayne Brown  Sent: 10/4/2022  11:12 AM CDT  To: [facility] Staff  Subject: pt advice                                        Александр with [facility] is calling in regards to pt Vik May regarding the [employer]KLE FOOT ORTHOSIS FOR HOME USE [] (Order [id number]), is requesting the note for insurance approval. Please fax to:  [phone]. Please call, if needed.

## 2022-12-01 ENCOUNTER — PATIENT MESSAGE (OUTPATIENT)
Dept: PSYCHIATRY | Facility: CLINIC | Age: 47
End: 2022-12-01
Payer: COMMERCIAL

## 2022-12-07 ENCOUNTER — TELEPHONE (OUTPATIENT)
Dept: NEUROLOGY | Facility: CLINIC | Age: 47
End: 2022-12-07
Payer: COMMERCIAL

## 2022-12-13 DIAGNOSIS — G35 MULTIPLE SCLEROSIS: ICD-10-CM

## 2022-12-13 DIAGNOSIS — M62.838 MUSCLE SPASM: ICD-10-CM

## 2022-12-15 RX ORDER — BACLOFEN 20 MG/1
20 TABLET ORAL NIGHTLY
Qty: 90 TABLET | Refills: 3 | Status: SHIPPED | OUTPATIENT
Start: 2022-12-15 | End: 2023-07-21 | Stop reason: SDUPTHER

## 2023-01-17 ENCOUNTER — PATIENT MESSAGE (OUTPATIENT)
Dept: NEUROLOGY | Facility: CLINIC | Age: 48
End: 2023-01-17
Payer: COMMERCIAL

## 2023-01-26 ENCOUNTER — OFFICE VISIT (OUTPATIENT)
Dept: NEUROLOGY | Facility: CLINIC | Age: 48
End: 2023-01-26
Payer: COMMERCIAL

## 2023-01-26 DIAGNOSIS — Z79.899 HIGH RISK MEDICATION USE: ICD-10-CM

## 2023-01-26 DIAGNOSIS — Z74.09 IMPAIRED MOBILITY: ICD-10-CM

## 2023-01-26 DIAGNOSIS — Z71.89 COUNSELING REGARDING GOALS OF CARE: ICD-10-CM

## 2023-01-26 DIAGNOSIS — G35 MULTIPLE SCLEROSIS: Primary | ICD-10-CM

## 2023-01-26 DIAGNOSIS — Z29.89 PROPHYLACTIC IMMUNOTHERAPY: ICD-10-CM

## 2023-01-26 DIAGNOSIS — R26.9 GAIT DISTURBANCE: ICD-10-CM

## 2023-01-26 PROCEDURE — 99215 PR OFFICE/OUTPT VISIT, EST, LEVL V, 40-54 MIN: ICD-10-PCS | Mod: 95,,, | Performed by: CLINICAL NURSE SPECIALIST

## 2023-01-26 PROCEDURE — 99215 OFFICE O/P EST HI 40 MIN: CPT | Mod: 95,,, | Performed by: CLINICAL NURSE SPECIALIST

## 2023-01-26 NOTE — PROGRESS NOTES
Subjective:          Patient ID: Vik May is a 47 y.o. female who presents today for a fit-in virtual visit for MS.  She was last seen in August 2022. The history has been provided by the patient.     The patient location is: her workplace   The chief complaint leading to consultation is: MS     Visit type: audiovisual    Face to Face time with patient: 30 minutes   45 minutes of total time spent on the encounter, which includes face to face time and non-face to face time preparing to see the patient (eg, review of tests), Obtaining and/or reviewing separately obtained history, Documenting clinical information in the electronic or other health record, Independently interpreting results (not separately reported) and communicating results to the patient/family/caregiver, or Care coordination (not separately reported).     Each patient to whom he or she provides medical services by telemedicine is:  (1) informed of the relationship between the physician and patient and the respective role of any other health care provider with respect to management of the patient; and (2) notified that he or she may decline to receive medical services by telemedicine and may withdraw from such care at any time.    MS HPI:  DMT: ocrelizumab due 3/15/23  Side effects from DMT? No  Taking vitamin D3 as recommended? Yes -  Dose: 4000 units daily   Since last visit, she reports that she is dragging her right leg more. She is falling more--2-3 times a week, with several other near misses.   She did PT and did not feel like this was helpful.   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 As part of her job, she has to get into large trucks, and she is unable to climb. There is also a lot of walking involved throughout her work day. She is not currently using any assistive device, but she does sometimes use her umbrella to stabilize her.  She feels off balance sometimes. She is drained at the  end of the workday.   She is open to a cane, but she is also interested in a motorized scooter. She goes to a lot of places with her son, usually to places that require longer distance walking. She finds herself walking with the furniture and walls at home. She would also use the scooter for housework and assist with movement between rooms for ADLs.     Medications:  Current Outpatient Medications   Medication Sig    acetaminophen (TYLENOL) 325 MG tablet Take 650 mg by mouth every 6 (six) hours as needed.    aspirin-caffeine 845-65 mg PwPk Take by mouth.    baclofen (LIORESAL) 20 MG tablet Take 1 tablet (20 mg total) by mouth every evening.    cetirizine (ZYRTEC) 10 MG tablet Take 10 mg by mouth once daily.    cholecalciferol, vitamin D3, (VITAMIN D3) 2,000 unit Cap Take 2 tablets by mouth.    diazePAM (VALIUM) 5 MG tablet Take 1 tablet by mouth 1 hour prior to MRI and 1 tablet at the time of the MRI.    esomeprazole (NEXIUM) 40 MG capsule Take 40 mg by mouth once daily.    ferrous sulfate (FEOSOL) 325 mg (65 mg iron) Tab tablet Take by mouth 2 (two) times daily.    fluticasone propionate (FLONASE) 50 mcg/actuation nasal spray by Each Nostril route.    losartan-hydrochlorothiazide 100-25 mg (HYZAAR) 100-25 mg per tablet Take 1 tablet by mouth once daily.    losartan-hydrochlorothiazide 50-12.5 mg (HYZAAR) 50-12.5 mg per tablet Take 1 tablet by mouth once daily.    medroxyPROGESTERone (DEPO-PROVERA) 150 mg/mL Syrg     traMADoL (ULTRAM) 50 mg tablet     traZODone (DESYREL) 50 MG tablet Take 1 tablet (50 mg total) by mouth every evening. Take 1/2 to 1 tab hs       SOCIAL HISTORY  Social History     Tobacco Use    Smoking status: Every Day    Smokeless tobacco: Never    Tobacco comments:     Pt states she vapes   Substance Use Topics    Alcohol use: Yes    Drug use: Not Currently       Living arrangements - the patient lives with their family.    ROS:    REVIEW OF SYMPTOMS 8/11/2022   Do you feel abnormally tired on  most days? Yes   Do you feel you generally sleep well? No   Do you have difficulty controlling your bladder?  Yes   Do you have difficulty controlling your bowels?  Yes   Do you have frequent muscle cramps, tightness or spasms in your limbs?  Yes   Do you have new visual symptoms?  No   Do you have worsening difficulty with your memory or thinking? No   Do you have worsening symptoms of anxiety or depression?  No   For patients who walk, Do you have more difficulty walking?  Yes   Have you fallen since your last visit?  Yes   For patients who use wheelchairs: Do you have any skin wounds or breakdown? No   Do you have difficulty using your hands?  No   Do you have shooting or burning pain? No   Do you have difficulty with sexual function?  Yes   If you are sexually active, are you using birth control? Y/N  N/A Yes   Do you often choke when swallowing liquids or solid food?  Yes   Do you experience worsening symptoms when overheated? Yes   Do you need any new equipment such as a wheelchair, walker or shower chair? No   Do you receive co-pay financial assistance for your principal MS medicine? Yes   Would you be interested in participating in an MS research trial in the future? No   For patients on Gilenya, Tecfidera, Aubagio, Rituxan, Ocrevus, Tysabri, Lemtrada or Methotrexate, are you aware that you should NOT receive live virus vaccines?  Yes   Do you feel you have adequate family/friend support?  Yes   Do you have health insurance?   Yes   Are you currently employed? Yes   Do you receive SSDI/SSI?  Not Applicable   Do you use marijuana or cannabis products? No   How often? -   Have you been diagnosed with a urinary tract infection since your last visit here? Yes   Have you been diagnosed with a respiratory tract infection since your last visit here? No   Have you been to the emergency room since your last visit here? No   Have you been hospitalized since your last visit here?  No                Objective:        1.  25 foot timed walk:  Timed 25 Foot Walk: 3/17/2022 8/15/2022   Did patient wear an AFO? No No   Was assistive device used? No Yes   Assistive device used (víctor one): - Bilateral Assistance   Bilateral device used - Walker/Rollator   Time for 25 Foot Walk (seconds) 11.6 12.5   Time for 25 Foot Walk (seconds) 12 -       Neurologic Exam    DEFERRED   Imaging:     Results for orders placed during the hospital encounter of 06/28/21    MRI Brain Demyelinating Without Contrast    Impression  Stable exam compared to 06/24/2020.  Findings compatible with multiple sclerosis.  No lesions demonstrating diffusion restriction to suggest active demyelination.  Stable plaque burden with no new lesions identified.      Electronically signed by: Jovanny Neal MD  Date:    06/28/2021  Time:    08:34    Results for orders placed during the hospital encounter of 07/08/22    MRI Brain Demyelinating W W/O Contrast    Impression  Brain and cervical spine appear stable from prior exam, again demonstrating findings compatible with the reported history of multiple sclerosis.  No new or enhancing lesions in these regions to indicate ongoing or active demyelination.    Several additional small nonenhancing demyelinating lesions within the thoracic spinal cord.  No prior studies available for comparison to assess for stability.    Mild cervical and thoracic spondylosis as above.    Cholelithiasis.    Electronically signed by resident: Travon Mckeon  Date:    07/08/2022  Time:    13:12    Electronically signed by: Jl Watters MD  Date:    07/08/2022  Time:    14:31    Results for orders placed during the hospital encounter of 07/08/22    MRI Cervical Spine Demyelinating W W/O Contrast    Impression  Brain and cervical spine appear stable from prior exam, again demonstrating findings compatible with the reported history of multiple sclerosis.  No new or enhancing lesions in these regions to indicate ongoing or active demyelination.    Several  additional small nonenhancing demyelinating lesions within the thoracic spinal cord.  No prior studies available for comparison to assess for stability.    Mild cervical and thoracic spondylosis as above.    Cholelithiasis.    Electronically signed by resident: Travon Mckeon  Date:    07/08/2022  Time:    13:12    Electronically signed by: Jl Watters MD  Date:    07/08/2022  Time:    14:31    Results for orders placed during the hospital encounter of 07/08/22    MRI Thoracic Spine Demyelinating W W/O Contrast    Impression  Brain and cervical spine appear stable from prior exam, again demonstrating findings compatible with the reported history of multiple sclerosis.  No new or enhancing lesions in these regions to indicate ongoing or active demyelination.    Several additional small nonenhancing demyelinating lesions within the thoracic spinal cord.  No prior studies available for comparison to assess for stability.    Mild cervical and thoracic spondylosis as above.    Cholelithiasis.    Electronically signed by resident: Travon Mckeon  Date:    07/08/2022  Time:    13:12    Electronically signed by: Jl Watters MD  Date:    07/08/2022  Time:    14:31        Labs:     Lab Results   Component Value Date    MOYFKHWG46EZ 37 10/16/2019     Lab Results   Component Value Date    JCVINDEX 2.38 (A) 10/16/2019    JCVANTIBODY Positive (A) 10/16/2019     Lab Results   Component Value Date    WC4EOSGR 70.4 01/06/2020    ABSOLUTECD3 1843.0 01/06/2020    QI6LKRBA 15.0 01/06/2020    ABSOLUTECD8 393.0 01/06/2020    DS0GMJLN 55.5 01/06/2020    ABSOLUTECD4 1453.0 (H) 01/06/2020    LABCD48 3.7 (H) 01/06/2020     Lab Results   Component Value Date    WBC 14.97 (H) 08/15/2022    RBC 5.15 08/15/2022    HGB 11.8 (L) 08/15/2022    HCT 36.4 (L) 08/15/2022    MCV 71 (L) 08/15/2022    MCH 22.9 (L) 08/15/2022    MCHC 32.4 08/15/2022    RDW 17.2 (H) 08/15/2022     08/15/2022    MPV 9.9 08/15/2022    GRAN 10.8 (H) 08/15/2022     GRAN 71.9 08/15/2022    LYMPH 3.0 08/15/2022    LYMPH 20.0 08/15/2022    MONO 1.0 08/15/2022    MONO 6.8 08/15/2022    EOS 0.1 08/15/2022    BASO 0.08 08/15/2022    EOSINOPHIL 0.5 08/15/2022    BASOPHIL 0.5 08/15/2022     Sodium   Date Value Ref Range Status   01/09/2020 140 136 - 145 mmol/L Final     Potassium   Date Value Ref Range Status   01/09/2020 4.2 3.5 - 5.1 mmol/L Final     Chloride   Date Value Ref Range Status   01/09/2020 107 95 - 110 mmol/L Final     CO2   Date Value Ref Range Status   01/09/2020 27 23 - 29 mmol/L Final     Glucose   Date Value Ref Range Status   01/09/2020 79 70 - 110 mg/dL Final     BUN   Date Value Ref Range Status   01/09/2020 11 7 - 17 mg/dL Final     Creatinine   Date Value Ref Range Status   01/09/2020 0.62 0.50 - 1.40 mg/dL Final     Calcium   Date Value Ref Range Status   01/09/2020 9.2 8.7 - 10.5 mg/dL Final     Total Protein   Date Value Ref Range Status   01/09/2020 7.3 6.0 - 8.4 g/dL Final     Albumin   Date Value Ref Range Status   01/09/2020 4.0 3.5 - 5.2 g/dL Final     Total Bilirubin   Date Value Ref Range Status   01/09/2020 0.3 0.1 - 1.0 mg/dL Final     Comment:     For infants and newborns, interpretation of results should be based  on gestational age, weight and in agreement with clinical  observations.  Premature Infant recommended reference ranges:  Up to 24 hours.............<8.0 mg/dL  Up to 48 hours............<12.0 mg/dL  3-5 days..................<15.0 mg/dL  6-29 days.................<15.0 mg/dL       Alkaline Phosphatase   Date Value Ref Range Status   01/09/2020 60 38 - 126 U/L Final     AST   Date Value Ref Range Status   01/09/2020 19 15 - 46 U/L Final     ALT   Date Value Ref Range Status   01/09/2020 13 10 - 44 U/L Final     Anion Gap   Date Value Ref Range Status   01/09/2020 6 (L) 8 - 16 mmol/L Final     eGFR if    Date Value Ref Range Status   01/09/2020 >60.0 >60 mL/min/1.73 m^2 Final     eGFR if non    Date Value  Ref Range Status   01/09/2020 >60.0 >60 mL/min/1.73 m^2 Final     Comment:     Calculation used to obtain the estimated glomerular filtration  rate (eGFR) is the CKD-EPI equation.        Lab Results   Component Value Date    HEPBSAG Negative 08/15/2022    HEPBSAB Grayzone 08/15/2022    HEPBCAB Negative 08/15/2022           MS Impression and Plan:     NEURO MULTIPLE SCLEROSIS IMPRESSION:   MS Status:     Clinical Progression:  Worsened    Clinical Progression comment:  Vik reports that walking is becoming more difficult, and she is unable to do certain aspects of her job as well. I think that this is reasonable for her to apply for disability at this time. Climbing is not safe for her, and walking frequently and for any significant distance is difficult and may increase her fatigue, leading to weakness and possibly increased risk for falls.   Plan:     DMT:  No change in management    DMT comment:  Continue Ocrevus and Vitamin D. Her infusion is due in March. We will check safety labs in February. She is aware of the risks associated with immunosuppressant therapy, including increased risk of infection.         Symptom Management:  Implement change in symptom management    Implement Change in Symptom Management:  Gait (Referral placed to PM&R for power mobility eval--scooter.)  She is interested in applying for long-term disability through her employer. She will initiate this process, but may need some guidance from our social work team.   We will provide her with the [employer] equipment application and letter so she can get a cane or walker.      She will keep her follow up with Dr. Maya in March         Daisy Moura, APRN, CNS    Problem List Items Addressed This Visit    None  Visit Diagnoses       Multiple sclerosis    -  Primary    Relevant Orders    CBC Auto Differential    Comprehensive Metabolic Panel    Hepatitis B Core Antibody, Total    Hepatitis B Surface Antigen    Immunoglobulins (IgG, IgA, IgM)  Quantitative    Vitamin D    Ambulatory referral/consult to Physical Medicine Rehab    Impaired mobility        Relevant Orders    Ambulatory referral/consult to Physical Medicine Rehab

## 2023-01-26 NOTE — Clinical Note
She is planning to apply for long-term disability through her job. It is becoming increasingly harder for her to do her job requirements. I support this.  She also needs the [facility] equipment application and diagnosis letter. Thanks!

## 2023-02-01 ENCOUNTER — PATIENT MESSAGE (OUTPATIENT)
Dept: PSYCHIATRY | Facility: CLINIC | Age: 48
End: 2023-02-01
Payer: COMMERCIAL

## 2023-02-02 ENCOUNTER — TELEPHONE (OUTPATIENT)
Dept: NEUROLOGY | Facility: CLINIC | Age: 48
End: 2023-02-02
Payer: COMMERCIAL

## 2023-02-02 NOTE — TELEPHONE ENCOUNTER
----- Message from SHANON Joseph, CNS sent at 1/28/2023  8:13 AM CST -----  1.) labs in February  2.) PM&R for mobility evaluation   3.) Keep VV with BB in March

## 2023-02-08 ENCOUNTER — PATIENT MESSAGE (OUTPATIENT)
Dept: NEUROLOGY | Facility: CLINIC | Age: 48
End: 2023-02-08
Payer: COMMERCIAL

## 2023-02-10 ENCOUNTER — LAB VISIT (OUTPATIENT)
Dept: LAB | Facility: HOSPITAL | Age: 48
End: 2023-02-10
Attending: CLINICAL NURSE SPECIALIST
Payer: COMMERCIAL

## 2023-02-10 DIAGNOSIS — G35 MULTIPLE SCLEROSIS: ICD-10-CM

## 2023-02-10 LAB
ALBUMIN SERPL BCP-MCNC: 3.8 G/DL (ref 3.5–5.2)
ALP SERPL-CCNC: 68 U/L (ref 55–135)
ALT SERPL W/O P-5'-P-CCNC: 15 U/L (ref 10–44)
ANION GAP SERPL CALC-SCNC: 11 MMOL/L (ref 8–16)
AST SERPL-CCNC: 16 U/L (ref 10–40)
BILIRUB SERPL-MCNC: 0.3 MG/DL (ref 0.1–1)
BUN SERPL-MCNC: 15 MG/DL (ref 6–20)
CALCIUM SERPL-MCNC: 9.4 MG/DL (ref 8.7–10.5)
CHLORIDE SERPL-SCNC: 111 MMOL/L (ref 95–110)
CO2 SERPL-SCNC: 19 MMOL/L (ref 23–29)
CREAT SERPL-MCNC: 0.7 MG/DL (ref 0.5–1.4)
EST. GFR  (NO RACE VARIABLE): >60 ML/MIN/1.73 M^2
GLUCOSE SERPL-MCNC: 66 MG/DL (ref 70–110)
POTASSIUM SERPL-SCNC: 3.6 MMOL/L (ref 3.5–5.1)
PROT SERPL-MCNC: 7 G/DL (ref 6–8.4)
SODIUM SERPL-SCNC: 141 MMOL/L (ref 136–145)

## 2023-02-10 PROCEDURE — 87340 HEPATITIS B SURFACE AG IA: CPT | Performed by: CLINICAL NURSE SPECIALIST

## 2023-02-10 PROCEDURE — 82784 ASSAY IGA/IGD/IGG/IGM EACH: CPT | Performed by: CLINICAL NURSE SPECIALIST

## 2023-02-10 PROCEDURE — 36415 COLL VENOUS BLD VENIPUNCTURE: CPT | Mod: PO | Performed by: CLINICAL NURSE SPECIALIST

## 2023-02-10 PROCEDURE — 80053 COMPREHEN METABOLIC PANEL: CPT | Performed by: CLINICAL NURSE SPECIALIST

## 2023-02-10 PROCEDURE — 86704 HEP B CORE ANTIBODY TOTAL: CPT | Performed by: CLINICAL NURSE SPECIALIST

## 2023-02-10 PROCEDURE — 82306 VITAMIN D 25 HYDROXY: CPT | Performed by: CLINICAL NURSE SPECIALIST

## 2023-02-10 PROCEDURE — 85025 COMPLETE CBC W/AUTO DIFF WBC: CPT | Performed by: CLINICAL NURSE SPECIALIST

## 2023-02-11 LAB
25(OH)D3+25(OH)D2 SERPL-MCNC: 61 NG/ML (ref 30–96)
BASOPHILS # BLD AUTO: 0.11 K/UL (ref 0–0.2)
BASOPHILS NFR BLD: 0.7 % (ref 0–1.9)
DIFFERENTIAL METHOD: ABNORMAL
EOSINOPHIL # BLD AUTO: 0.2 K/UL (ref 0–0.5)
EOSINOPHIL NFR BLD: 1 % (ref 0–8)
ERYTHROCYTE [DISTWIDTH] IN BLOOD BY AUTOMATED COUNT: 20.3 % (ref 11.5–14.5)
HBV CORE AB SERPL QL IA: NORMAL
HBV SURFACE AG SERPL QL IA: NORMAL
HCT VFR BLD AUTO: 34.2 % (ref 37–48.5)
HGB BLD-MCNC: 10.2 G/DL (ref 12–16)
IGA SERPL-MCNC: 110 MG/DL (ref 40–350)
IGG SERPL-MCNC: 945 MG/DL (ref 650–1600)
IGM SERPL-MCNC: 50 MG/DL (ref 50–300)
IMM GRANULOCYTES # BLD AUTO: 0.04 K/UL (ref 0–0.04)
IMM GRANULOCYTES NFR BLD AUTO: 0.3 % (ref 0–0.5)
LYMPHOCYTES # BLD AUTO: 3.1 K/UL (ref 1–4.8)
LYMPHOCYTES NFR BLD: 19.8 % (ref 18–48)
MCH RBC QN AUTO: 21.2 PG (ref 27–31)
MCHC RBC AUTO-ENTMCNC: 29.8 G/DL (ref 32–36)
MCV RBC AUTO: 71 FL (ref 82–98)
MONOCYTES # BLD AUTO: 1 K/UL (ref 0.3–1)
MONOCYTES NFR BLD: 6.1 % (ref 4–15)
NEUTROPHILS # BLD AUTO: 11.4 K/UL (ref 1.8–7.7)
NEUTROPHILS NFR BLD: 72.1 % (ref 38–73)
NRBC BLD-RTO: 0 /100 WBC
PLATELET # BLD AUTO: 422 K/UL (ref 150–450)
PMV BLD AUTO: 10.3 FL (ref 9.2–12.9)
RBC # BLD AUTO: 4.81 M/UL (ref 4–5.4)
WBC # BLD AUTO: 15.78 K/UL (ref 3.9–12.7)

## 2023-02-15 ENCOUNTER — TELEPHONE (OUTPATIENT)
Dept: NEUROLOGY | Facility: CLINIC | Age: 48
End: 2023-02-15
Payer: COMMERCIAL

## 2023-02-15 NOTE — TELEPHONE ENCOUNTER
----- Message from Daisy Moura, SHANON, CNS sent at 2/11/2023 12:38 PM CST -----  Persistently elevated WBC and ANC   Normal ALC   Normal LFTs   Vit D=61--continue current dose   Negative hep B labs   Normal immunoglobulins     Ok to schedule next infusion   
Infusion scheduled on 3/16/23 at [facility].  
Detail Level: Detailed
Detail Level: Zone

## 2023-02-20 ENCOUNTER — PATIENT MESSAGE (OUTPATIENT)
Dept: PSYCHIATRY | Facility: CLINIC | Age: 48
End: 2023-02-20
Payer: COMMERCIAL

## 2023-02-22 ENCOUNTER — PATIENT MESSAGE (OUTPATIENT)
Dept: NEUROLOGY | Facility: CLINIC | Age: 48
End: 2023-02-22
Payer: COMMERCIAL

## 2023-03-14 ENCOUNTER — PATIENT MESSAGE (OUTPATIENT)
Dept: NEUROLOGY | Facility: CLINIC | Age: 48
End: 2023-03-14

## 2023-03-15 ENCOUNTER — TELEPHONE (OUTPATIENT)
Dept: PSYCHIATRY | Facility: CLINIC | Age: 48
End: 2023-03-15

## 2023-03-15 NOTE — TELEPHONE ENCOUNTER
F/u re: disabilty paperwork. Ms. Kumari says employer faxed us short term disability forms. As this writer hasn't seen them, I asked her to have them refax at [phone]. She will also see if she can email them to us.

## 2023-03-20 ENCOUNTER — OFFICE VISIT (OUTPATIENT)
Dept: NEUROLOGY | Facility: CLINIC | Age: 48
End: 2023-03-20
Payer: COMMERCIAL

## 2023-03-20 DIAGNOSIS — N31.9 NEUROGENIC BLADDER: Primary | ICD-10-CM

## 2023-03-20 DIAGNOSIS — G35 MULTIPLE SCLEROSIS: ICD-10-CM

## 2023-03-20 DIAGNOSIS — R26.9 GAIT DISTURBANCE: ICD-10-CM

## 2023-03-20 PROCEDURE — 99215 OFFICE O/P EST HI 40 MIN: CPT | Mod: 95,,, | Performed by: PSYCHIATRY & NEUROLOGY

## 2023-03-20 PROCEDURE — 99215 PR OFFICE/OUTPT VISIT, EST, LEVL V, 40-54 MIN: ICD-10-PCS | Mod: 95,,, | Performed by: PSYCHIATRY & NEUROLOGY

## 2023-03-20 RX ORDER — TOLTERODINE 2 MG/1
4 CAPSULE, EXTENDED RELEASE ORAL DAILY
Qty: 60 CAPSULE | Refills: 11 | Status: SHIPPED | OUTPATIENT
Start: 2023-03-20 | End: 2023-10-24 | Stop reason: SDUPTHER

## 2023-03-20 RX ORDER — DALFAMPRIDINE 10 MG/1
10 TABLET, FILM COATED, EXTENDED RELEASE ORAL EVERY 12 HOURS
Qty: 60 TABLET | Refills: 1 | Status: SHIPPED | OUTPATIENT
Start: 2023-03-20 | End: 2023-04-18 | Stop reason: SDUPTHER

## 2023-03-20 NOTE — PROGRESS NOTES
The patient location is: home  The chief complaint leading to consultation is: MS    Visit type: audiovisual    Face to Face time with patient: 35  40 minutes of total time spent on the encounter, which includes face to face time and non-face to face time preparing to see the patient (eg, review of tests), Obtaining and/or reviewing separately obtained history, Documenting clinical information in the electronic or other health record, Independently interpreting results (not separately reported) and communicating results to the patient/family/caregiver, or Care coordination (not separately reported).         Each patient to whom he or she provides medical services by telemedicine is:  (1) informed of the relationship between the physician and patient and the respective role of any other health care provider with respect to management of the patient; and (2) notified that he or she may decline to receive medical services by telemedicine and may withdraw from such care at any time.    Notes:       Subjective:          Patient ID: Vik May is a 47 y.o. female who presents today for a routine video visit for MS.   WE had difficulty sustaining the video connection so we converted to telephone visit    MS HPI:  DMT: ocrelizumab -just had it last week;  outpatient infusion center;   Side effects from DMT? No, tolerated the 2 hour infusion;   Taking vitamin D3 as recommended? Yes, 4000 IU/day   She has been placed on STD.   She is applying for long term disability.   She will have power mobility evaluation next week   She feels her walk is continuing to slow down.   Takes baclofen every night; muscle spasms are controlled   She has urinary urgency;    Had been doing therapy, but she did not find it helpful.  Was going to [facility]; she stopped going.     Medications:  Current Outpatient Medications   Medication Sig    acetaminophen (TYLENOL) 325 MG tablet Take 650 mg by mouth every 6 (six) hours as needed.     aspirin-caffeine 845-65 mg PwPk Take by mouth.    baclofen (LIORESAL) 20 MG tablet Take 1 tablet (20 mg total) by mouth every evening.    cetirizine (ZYRTEC) 10 MG tablet Take 10 mg by mouth once daily.    cholecalciferol, vitamin D3, (VITAMIN D3) 2,000 unit Cap Take 2 tablets by mouth.    esomeprazole (NEXIUM) 40 MG capsule Take 40 mg by mouth once daily.    ferrous sulfate (FEOSOL) 325 mg (65 mg iron) Tab tablet Take by mouth 2 (two) times daily.    fluticasone propionate (FLONASE) 50 mcg/actuation nasal spray by Each Nostril route.    losartan-hydrochlorothiazide 100-25 mg (HYZAAR) 100-25 mg per tablet Take 1 tablet by mouth once daily.    losartan-hydrochlorothiazide 50-12.5 mg (HYZAAR) 50-12.5 mg per tablet Take 1 tablet by mouth once daily.    medroxyPROGESTERone (DEPO-PROVERA) 150 mg/mL Syrg     traMADoL (ULTRAM) 50 mg tablet     traZODone (DESYREL) 50 MG tablet Take 1 tablet (50 mg total) by mouth every evening. Take 1/2 to 1 tab hs     No current facility-administered medications for this visit.       SOCIAL HISTORY  Social History     Tobacco Use    Smoking status: Every Day     Types: Cigarettes    Smokeless tobacco: Never    Tobacco comments:     Pt states she vapes   Substance Use Topics    Alcohol use: Yes    Drug use: Not Currently       REVIEW OF SYMPTOMS 8/11/2022   Do you feel abnormally tired on most days? Yes   Do you feel you generally sleep well? No   Do you have difficulty controlling your bladder?  Yes   Do you have difficulty controlling your bowels?  Yes   Do you have frequent muscle cramps, tightness or spasms in your limbs?  Yes   Do you have new visual symptoms?  No   Do you have worsening difficulty with your memory or thinking? No   Do you have worsening symptoms of anxiety or depression?  No   For patients who walk, Do you have more difficulty walking?  Yes   Have you fallen since your last visit?  Yes   For patients who use wheelchairs: Do you have any skin wounds or breakdown? No    Do you have difficulty using your hands?  No   Do you have shooting or burning pain? No   Do you have difficulty with sexual function?  Yes   If you are sexually active, are you using birth control? Y/N  N/A Yes   Do you often choke when swallowing liquids or solid food?  Yes   Do you experience worsening symptoms when overheated? Yes   Do you need any new equipment such as a wheelchair, walker or shower chair? No   Do you receive co-pay financial assistance for your principal MS medicine? Yes   Would you be interested in participating in an MS research trial in the future? No   For patients on Gilenya, Tecfidera, Aubagio, Rituxan, Ocrevus, Tysabri, Lemtrada or Methotrexate, are you aware that you should NOT receive live virus vaccines?  Yes   Do you feel you have adequate family/friend support?  Yes   Do you have health insurance?   Yes   Are you currently employed? Yes   Do you receive SSDI/SSI?  Not Applicable   Do you use marijuana or cannabis products? No   How often? -   Have you been diagnosed with a urinary tract infection since your last visit here? Yes   Have you been diagnosed with a respiratory tract infection since your last visit here? No   Have you been to the emergency room since your last visit here? No   Have you been hospitalized since your last visit here?  No           Imaging:     Results for orders placed during the hospital encounter of 06/28/21    MRI Brain Demyelinating Without Contrast    Impression  Stable exam compared to 06/24/2020.  Findings compatible with multiple sclerosis.  No lesions demonstrating diffusion restriction to suggest active demyelination.  Stable plaque burden with no new lesions identified.      Electronically signed by: Jovanny Neal MD  Date:    06/28/2021  Time:    08:34    No results found for this or any previous visit.    No results found for this or any previous visit.    Results for orders placed during the hospital encounter of 07/08/22    MRI Brain  Demyelinating W W/O Contrast    Impression  Brain and cervical spine appear stable from prior exam, again demonstrating findings compatible with the reported history of multiple sclerosis.  No new or enhancing lesions in these regions to indicate ongoing or active demyelination.    Several additional small nonenhancing demyelinating lesions within the thoracic spinal cord.  No prior studies available for comparison to assess for stability.    Mild cervical and thoracic spondylosis as above.    Cholelithiasis.    Electronically signed by resident: Travon Mckeon  Date:    07/08/2022  Time:    13:12    Electronically signed by: Jl Watters MD  Date:    07/08/2022  Time:    14:31    Results for orders placed during the hospital encounter of 07/08/22    MRI Cervical Spine Demyelinating W W/O Contrast    Impression  Brain and cervical spine appear stable from prior exam, again demonstrating findings compatible with the reported history of multiple sclerosis.  No new or enhancing lesions in these regions to indicate ongoing or active demyelination.    Several additional small nonenhancing demyelinating lesions within the thoracic spinal cord.  No prior studies available for comparison to assess for stability.    Mild cervical and thoracic spondylosis as above.    Cholelithiasis.    Electronically signed by resident: Travon Mckeon  Date:    07/08/2022  Time:    13:12    Electronically signed by: Jl Watters MD  Date:    07/08/2022  Time:    14:31    Results for orders placed during the hospital encounter of 07/08/22    MRI Thoracic Spine Demyelinating W W/O Contrast    Impression  Brain and cervical spine appear stable from prior exam, again demonstrating findings compatible with the reported history of multiple sclerosis.  No new or enhancing lesions in these regions to indicate ongoing or active demyelination.    Several additional small nonenhancing demyelinating lesions within the thoracic spinal cord.  No prior  studies available for comparison to assess for stability.    Mild cervical and thoracic spondylosis as above.    Cholelithiasis.    Electronically signed by resident: Travon Mckeon  Date:    07/08/2022  Time:    13:12    Electronically signed by: Jl Watters MD  Date:    07/08/2022  Time:    14:31        Labs:     Lab Results   Component Value Date    YVLMVDXQ48DH 61 02/10/2023    EJYJCIUS57DP 37 10/16/2019     Lab Results   Component Value Date    JCVINDEX 2.38 (A) 10/16/2019    JCVANTIBODY Positive (A) 10/16/2019     Lab Results   Component Value Date    BT5AWULS 70.4 01/06/2020    ABSOLUTECD3 1843.0 01/06/2020    AG4QTUCZ 15.0 01/06/2020    ABSOLUTECD8 393.0 01/06/2020    YC7OCWMN 55.5 01/06/2020    ABSOLUTECD4 1453.0 (H) 01/06/2020    LABCD48 3.7 (H) 01/06/2020     Lab Results   Component Value Date    WBC 15.78 (H) 02/10/2023    RBC 4.81 02/10/2023    HGB 10.2 (L) 02/10/2023    HCT 34.2 (L) 02/10/2023    MCV 71 (L) 02/10/2023    MCH 21.2 (L) 02/10/2023    MCHC 29.8 (L) 02/10/2023    RDW 20.3 (H) 02/10/2023     02/10/2023    MPV 10.3 02/10/2023    GRAN 11.4 (H) 02/10/2023    GRAN 72.1 02/10/2023    LYMPH 3.1 02/10/2023    LYMPH 19.8 02/10/2023    MONO 1.0 02/10/2023    MONO 6.1 02/10/2023    EOS 0.2 02/10/2023    BASO 0.11 02/10/2023    EOSINOPHIL 1.0 02/10/2023    BASOPHIL 0.7 02/10/2023     Sodium   Date Value Ref Range Status   02/10/2023 141 136 - 145 mmol/L Final     Potassium   Date Value Ref Range Status   02/10/2023 3.6 3.5 - 5.1 mmol/L Final     Chloride   Date Value Ref Range Status   02/10/2023 111 (H) 95 - 110 mmol/L Final     CO2   Date Value Ref Range Status   02/10/2023 19 (L) 23 - 29 mmol/L Final     Glucose   Date Value Ref Range Status   02/10/2023 66 (L) 70 - 110 mg/dL Final     BUN   Date Value Ref Range Status   02/10/2023 15 6 - 20 mg/dL Final     Creatinine   Date Value Ref Range Status   02/10/2023 0.7 0.5 - 1.4 mg/dL Final     Calcium   Date Value Ref Range Status   02/10/2023  9.4 8.7 - 10.5 mg/dL Final     Total Protein   Date Value Ref Range Status   02/10/2023 7.0 6.0 - 8.4 g/dL Final     Albumin   Date Value Ref Range Status   02/10/2023 3.8 3.5 - 5.2 g/dL Final     Total Bilirubin   Date Value Ref Range Status   02/10/2023 0.3 0.1 - 1.0 mg/dL Final     Comment:     For infants and newborns, interpretation of results should be based  on gestational age, weight and in agreement with clinical  observations.    Premature Infant recommended reference ranges:  Up to 24 hours.............<8.0 mg/dL  Up to 48 hours............<12.0 mg/dL  3-5 days..................<15.0 mg/dL  6-29 days.................<15.0 mg/dL       Alkaline Phosphatase   Date Value Ref Range Status   02/10/2023 68 55 - 135 U/L Final     AST   Date Value Ref Range Status   02/10/2023 16 10 - 40 U/L Final     ALT   Date Value Ref Range Status   02/10/2023 15 10 - 44 U/L Final     Anion Gap   Date Value Ref Range Status   02/10/2023 11 8 - 16 mmol/L Final     eGFR if    Date Value Ref Range Status   01/09/2020 >60.0 >60 mL/min/1.73 m^2 Final     eGFR if non    Date Value Ref Range Status   01/09/2020 >60.0 >60 mL/min/1.73 m^2 Final     Comment:     Calculation used to obtain the estimated glomerular filtration  rate (eGFR) is the CKD-EPI equation.        Lab Results   Component Value Date    HEPBSAG Non-reactive 02/10/2023    HEPBSAB [place] 08/15/2022    HEPBCAB Non-reactive 02/10/2023           MS Impression and Plan:     NEURO MULTIPLE SCLEROSIS IMPRESSION:   MS Status:     Number of relapses in the past year?:  0    Clinical Progression:  Worsened    Type:  Progressive    MRI Progression:  Stable  Plan:     DMT:  No change in management    Symptom Management:  Implement change in symptom management    Implement Change in Symptom Management:  Adaptive Needs, Bladder and Gait (restart both detrol LA and dalfampridine; CrCl on recent labs okay)     MRis in July  F/u [facility] in 4mo              Problem List Items Addressed This Visit    None      Nayely Maya MD    I spent a total of 40 minutes on the day of the visit.This includes face to face time and non-face to face time preparing to see the patient (eg, review of tests), obtaining and/or reviewing separately obtained history, documenting clinical information in the electronic or other health record, independently interpreting results and communicating results to the patient/family/caregiver, or care coordinator.

## 2023-03-22 ENCOUNTER — OFFICE VISIT (OUTPATIENT)
Dept: PHYSICAL MEDICINE AND REHAB | Facility: CLINIC | Age: 48
End: 2023-03-22
Payer: COMMERCIAL

## 2023-03-22 VITALS
SYSTOLIC BLOOD PRESSURE: 120 MMHG | BODY MASS INDEX: 37.24 KG/M2 | HEART RATE: 103 BPM | HEIGHT: 64 IN | DIASTOLIC BLOOD PRESSURE: 80 MMHG | WEIGHT: 218.13 LBS

## 2023-03-22 DIAGNOSIS — M54.41 CHRONIC BILATERAL LOW BACK PAIN WITH BILATERAL SCIATICA: ICD-10-CM

## 2023-03-22 DIAGNOSIS — R20.0 BILATERAL HAND NUMBNESS: ICD-10-CM

## 2023-03-22 DIAGNOSIS — G89.29 CHRONIC BILATERAL LOW BACK PAIN WITH BILATERAL SCIATICA: ICD-10-CM

## 2023-03-22 DIAGNOSIS — E66.01 SEVERE OBESITY (BMI 35.0-35.9 WITH COMORBIDITY): ICD-10-CM

## 2023-03-22 DIAGNOSIS — G35 MS (MULTIPLE SCLEROSIS): ICD-10-CM

## 2023-03-22 DIAGNOSIS — R53.83 FATIGUE, UNSPECIFIED TYPE: ICD-10-CM

## 2023-03-22 DIAGNOSIS — M54.42 CHRONIC BILATERAL LOW BACK PAIN WITH BILATERAL SCIATICA: ICD-10-CM

## 2023-03-22 DIAGNOSIS — R26.9 GAIT DISORDER: Primary | ICD-10-CM

## 2023-03-22 DIAGNOSIS — R29.6 FREQUENT FALLS: ICD-10-CM

## 2023-03-22 PROCEDURE — 99999 PR PBB SHADOW E&M-EST. PATIENT-LVL III: ICD-10-PCS | Mod: PBBFAC,,, | Performed by: PHYSICAL MEDICINE & REHABILITATION

## 2023-03-22 PROCEDURE — 99204 PR OFFICE/OUTPT VISIT, NEW, LEVL IV, 45-59 MIN: ICD-10-PCS | Mod: S$PBB,,, | Performed by: PHYSICAL MEDICINE & REHABILITATION

## 2023-03-22 PROCEDURE — 99999 PR PBB SHADOW E&M-EST. PATIENT-LVL III: CPT | Mod: PBBFAC,,, | Performed by: PHYSICAL MEDICINE & REHABILITATION

## 2023-03-22 PROCEDURE — 99204 OFFICE O/P NEW MOD 45 MIN: CPT | Mod: S$PBB,,, | Performed by: PHYSICAL MEDICINE & REHABILITATION

## 2023-03-22 NOTE — PROGRESS NOTES
Subjective:       Patient ID: Vik May is a 47 y.o. female.    Chief Complaint: Leg Pain and Back Pain      HPI    Mrs. May is a 47-year-old black female who is presenting to the Physical Medicine Clinic for evaluation for a power mobility device.  Past medical history is significant for multiple sclerosis diagnosed around 2012, hypertension, chronic low back pain with bilateral sciatica, obesity (weight of 218 lb and BMI of 37.4).      The patient lives with her  in a single-story home with 2 steps to enter.  She is independent with feeding herself.  She requires occasional assistance for dressing.  She is independent with toileting.  She requires assistance for bathing.  She has trouble getting in and out of the bathtub.  She ambulates using a Rollator walker.  When her symptoms are active she can go about 25-30 feet.  She is restricted by lower extremity weakness, fatigue, chronic low back pain with bilateral radiculopathy.  She reports history of falling about couple times per week with occasional bruising of her knee or elbow.  She can not propel a manual wheelchair due to upper extremity weakness and fatigue.  She occasionally drops objects.  She also reports occasional tingling in her hands.       Past Medical History:   Diagnosis Date    Hypertension     Multiple sclerosis         Review of patient's allergies indicates:  No Known Allergies     Review of Systems   Constitutional:  Positive for fatigue. Negative for chills and fever.   Eyes:  Negative for visual disturbance.   Respiratory:  Positive for shortness of breath.    Cardiovascular:  Negative for chest pain.   Gastrointestinal:  Negative for nausea and vomiting.   Genitourinary:  Positive for difficulty urinating and urgency.   Musculoskeletal:  Positive for back pain and gait problem. Negative for arthralgias and neck pain.   Neurological:  Positive for weakness and headaches. Negative for dizziness.   Psychiatric/Behavioral:  Negative  for behavioral problems.            Objective:      Physical Exam  Vitals reviewed.   Constitutional:       General: She is not in acute distress.     Appearance: She is well-developed.   HENT:      Head: Normocephalic and atraumatic.   Cardiovascular:      Rate and Rhythm: Normal rate and regular rhythm.      Heart sounds: Normal heart sounds.   Pulmonary:      Effort: Pulmonary effort is normal.      Breath sounds: Normal breath sounds.   Abdominal:      Palpations: Abdomen is soft.   Musculoskeletal:      Cervical back: Normal range of motion.      Comments: BUE:  ROM:   RUE: full.   LUE: full.  Strength:    RUE: 4/5 at shoulder abduction, 4+ elbow flexion, 4 elbow extension, 4+ hand .   LUE: 4/5 at shoulder abduction, 4+ elbow flexion, 4 elbow extension, 4+ hand .  Sensation to pinprick:   RUE: intact.   LUE: intact.  DTR:    RUE: +1 biceps, +1 triceps.   LUE:  +1 biceps, +1 triceps.      BLE:  ROM:   RLE: full.   LLE: full.  Knees:   RLE: +ve crepitus.    LLE: +ve crepitus.  Strength:    RLE: 3/5 at hip flexion, 4 knee extension, 3 ankle DF, 4 ankle PF.   LLE: 3/5 at hip flexion, 4 knee extension, 4 ankle DF,  4 ankle PF.  Sensation to pinprick:     RLE: intact.      LLE: intact.   DTR:     RLE: +2 knee, +1 ankle.    LLE: +2 knee, +1 ankle.  SLR (sitting):      RLE: +ve.      LLE: +ve.     +ve mild tenderness over low lumbar spine.    Gait: slow jessica, using a rollator   Skin:     General: Skin is warm.   Neurological:      Mental Status: She is alert and oriented to person, place, and time.       Assessment:       1. Gait disorder    2. MS (multiple sclerosis)    3. Chronic bilateral low back pain with bilateral sciatica    4. Frequent falls    5. Fatigue, unspecified type    6. Bilateral hand numbness    7. Severe obesity (BMI 35.0-35.9 with comorbidity)          Assessment/Plan:             - The patient was seen today for mobility evaluation for a power mobility device due to significant  impairment at home.  - The patient has multifactorial gait impairment.  - The patient is not able to ambulate safely at home.  - The patient is unable to use a walker functional distances due to fatigue and bilateral lower extremity weakness due to MS, chronic low back pain with bilateral radiculopathy.  - The patient is unable to use an optimally-configured manual wheelchair at home due to fatigue and bilateral upper extremity weakness due to MS, bilateral hand numbness, and higher energy requirement for wheelchair propulsion due to severe obesity.  - The patient has history of falls, which could be detrimental to her health.  - The patient has intact cognition and should be able to use a power mobility device well at home.  - A prescription for an electric scooter was generated.  - The patient has enough upper & lower extremity strength to be able to transfer to and from the power mobility device.  - The patient has enough range of motion & strength in BUE to allow functional operation of the tiller.  - The patient has good trunk balance and should be able to maintain a safe posture while operating a power mobility device.  She reports using a scooter in department stores and being able to ride it and maneuver it without significant problems.  - This will allow the patient to go safely to the kitchen, dining room or living room for feeding & socialization.  It should also help with energy conservation and reducing the risk of falls and injuries.  - A prescription for a Transfer Tub Bench was also generated to improve safety of transferring in/out of the bathtub and bathing.  - The patient is to return the Physical Medicine/Mobility clinic prn.      This note was partly generated with [employer] voice recognition software. I apologize for any possible typographical errors.

## 2023-03-30 ENCOUNTER — PATIENT MESSAGE (OUTPATIENT)
Dept: NEUROLOGY | Facility: CLINIC | Age: 48
End: 2023-03-30

## 2023-03-31 ENCOUNTER — TELEPHONE (OUTPATIENT)
Dept: PSYCHIATRY | Facility: CLINIC | Age: 48
End: 2023-03-31

## 2023-03-31 NOTE — TELEPHONE ENCOUNTER
Received VM from employer disability insurer. They are looking for additional information on functioning level/ clinical information supporting claim. Will f/u with  and providers.

## 2023-04-06 ENCOUNTER — TELEPHONE (OUTPATIENT)
Dept: PSYCHIATRY | Facility: CLINIC | Age: 48
End: 2023-04-06
Payer: MEDICAID

## 2023-04-06 ENCOUNTER — TELEPHONE (OUTPATIENT)
Dept: NEUROLOGY | Facility: CLINIC | Age: 48
End: 2023-04-06
Payer: MEDICAID

## 2023-04-06 NOTE — TELEPHONE ENCOUNTER
[employer]: Binh / [phone] Ext [phone]     Left 2nd VM with Binh regarding questions about Ms. May's disability claim.

## 2023-04-06 NOTE — TELEPHONE ENCOUNTER
Called pt to get her scheduled for an in person appt in order to discuss disability claims. Offered pt appt on 4/18 at 1:00 with AP (using 1:00 and 1:40 VV slots). Pt agreed. Will get Katarzyna to schedule.

## 2023-04-18 ENCOUNTER — PATIENT MESSAGE (OUTPATIENT)
Dept: NEUROLOGY | Facility: CLINIC | Age: 48
End: 2023-04-18

## 2023-04-18 ENCOUNTER — OFFICE VISIT (OUTPATIENT)
Dept: NEUROLOGY | Facility: CLINIC | Age: 48
End: 2023-04-18
Payer: MEDICAID

## 2023-04-18 VITALS
HEART RATE: 90 BPM | BODY MASS INDEX: 36.28 KG/M2 | HEIGHT: 64 IN | DIASTOLIC BLOOD PRESSURE: 76 MMHG | WEIGHT: 212.5 LBS | SYSTOLIC BLOOD PRESSURE: 116 MMHG

## 2023-04-18 DIAGNOSIS — Z29.89 PROPHYLACTIC IMMUNOTHERAPY: ICD-10-CM

## 2023-04-18 DIAGNOSIS — R26.9 GAIT DISTURBANCE: ICD-10-CM

## 2023-04-18 DIAGNOSIS — G35 MULTIPLE SCLEROSIS: Primary | ICD-10-CM

## 2023-04-18 DIAGNOSIS — Z79.899 HIGH RISK MEDICATION USE: ICD-10-CM

## 2023-04-18 DIAGNOSIS — R53.83 FATIGUE, UNSPECIFIED TYPE: ICD-10-CM

## 2023-04-18 DIAGNOSIS — Z71.89 COUNSELING REGARDING GOALS OF CARE: ICD-10-CM

## 2023-04-18 PROCEDURE — 99215 OFFICE O/P EST HI 40 MIN: CPT | Mod: S$PBB,,, | Performed by: CLINICAL NURSE SPECIALIST

## 2023-04-18 PROCEDURE — 3078F DIAST BP <80 MM HG: CPT | Mod: CPTII,,, | Performed by: CLINICAL NURSE SPECIALIST

## 2023-04-18 PROCEDURE — 99215 PR OFFICE/OUTPT VISIT, EST, LEVL V, 40-54 MIN: ICD-10-PCS | Mod: S$PBB,,, | Performed by: CLINICAL NURSE SPECIALIST

## 2023-04-18 PROCEDURE — 1159F PR MEDICATION LIST DOCUMENTED IN MEDICAL RECORD: ICD-10-PCS | Mod: CPTII,,, | Performed by: CLINICAL NURSE SPECIALIST

## 2023-04-18 PROCEDURE — 3008F PR BODY MASS INDEX (BMI) DOCUMENTED: ICD-10-PCS | Mod: CPTII,,, | Performed by: CLINICAL NURSE SPECIALIST

## 2023-04-18 PROCEDURE — 3008F BODY MASS INDEX DOCD: CPT | Mod: CPTII,,, | Performed by: CLINICAL NURSE SPECIALIST

## 2023-04-18 PROCEDURE — 99213 OFFICE O/P EST LOW 20 MIN: CPT | Mod: PBBFAC | Performed by: CLINICAL NURSE SPECIALIST

## 2023-04-18 PROCEDURE — 3078F PR MOST RECENT DIASTOLIC BLOOD PRESSURE < 80 MM HG: ICD-10-PCS | Mod: CPTII,,, | Performed by: CLINICAL NURSE SPECIALIST

## 2023-04-18 PROCEDURE — 3074F PR MOST RECENT SYSTOLIC BLOOD PRESSURE < 130 MM HG: ICD-10-PCS | Mod: CPTII,,, | Performed by: CLINICAL NURSE SPECIALIST

## 2023-04-18 PROCEDURE — 99999 PR PBB SHADOW E&M-EST. PATIENT-LVL III: CPT | Mod: PBBFAC,,, | Performed by: CLINICAL NURSE SPECIALIST

## 2023-04-18 PROCEDURE — 1159F MED LIST DOCD IN RCRD: CPT | Mod: CPTII,,, | Performed by: CLINICAL NURSE SPECIALIST

## 2023-04-18 PROCEDURE — 99999 PR PBB SHADOW E&M-EST. PATIENT-LVL III: ICD-10-PCS | Mod: PBBFAC,,, | Performed by: CLINICAL NURSE SPECIALIST

## 2023-04-18 PROCEDURE — 3074F SYST BP LT 130 MM HG: CPT | Mod: CPTII,,, | Performed by: CLINICAL NURSE SPECIALIST

## 2023-04-18 RX ORDER — ARMODAFINIL 50 MG/1
50 TABLET ORAL DAILY
Qty: 30 TABLET | Refills: 0 | Status: SHIPPED | OUTPATIENT
Start: 2023-04-18 | End: 2023-05-18

## 2023-04-18 RX ORDER — DALFAMPRIDINE 10 MG/1
10 TABLET, FILM COATED, EXTENDED RELEASE ORAL EVERY 12 HOURS
Qty: 60 TABLET | Refills: 2 | Status: SHIPPED | OUTPATIENT
Start: 2023-04-18 | End: 2023-07-21 | Stop reason: SDUPTHER

## 2023-04-18 NOTE — PROGRESS NOTES
Subjective:          Patient ID: Vik May is a 47 y.o. female who presents today for a routine clinic visit for MS.  She was last seen by Dr. Zulma elias in March 2023. The history has been provided by the patient.       MS HPI:  DMT: Ocrevus; last infused in March; due in September   Side effects from DMT? No  Taking vitamin D3 as recommended? Yes -  Dose: 5000 units daily   She has been out of work since 2/9/23. She continues to feel tired all the time; even with napping during the day, she still feels tired. She does not feel rested when she gets up in the morning. She feels like she is getting good quality sleep.   She is staying active around the house.   She has had 1 fall in the past few months.   She is using her walker for distances.   She has applied for social security disability, but there has been no decision on this yet.   She denies any recent infections.   She has started Detrol for her bladder. She feels like she is able to hold her urine better.   She had some constipation last week that resolved with diet changes.   She has not been taking dalfampridine.   She does not like the way amantadine made her feel in the past, but she would like to try something for fatigue.       Medications:  Current Outpatient Medications   Medication Sig    acetaminophen (TYLENOL) 325 MG tablet Take 650 mg by mouth every 6 (six) hours as needed.    aspirin-caffeine 845-65 mg PwPk Take by mouth.    baclofen (LIORESAL) 20 MG tablet Take 1 tablet (20 mg total) by mouth every evening.    cetirizine (ZYRTEC) 10 MG tablet Take 10 mg by mouth once daily.    cholecalciferol, vitamin D3, (VITAMIN D3) 2,000 unit Cap Take 2 tablets by mouth.    dalfampridine 10 mg Tb12 Take 1 tablet (10 mg total) by mouth every 12 (twelve) hours.    esomeprazole (NEXIUM) 40 MG capsule Take 40 mg by mouth once daily.    ferrous sulfate (FEOSOL) 325 mg (65 mg iron) Tab tablet Take by mouth 2 (two) times daily.    fluticasone propionate  (FLONASE) 50 mcg/actuation nasal spray by Each Nostril route.    losartan-hydrochlorothiazide 100-25 mg (HYZAAR) 100-25 mg per tablet Take 1 tablet by mouth once daily.    losartan-hydrochlorothiazide 50-12.5 mg (HYZAAR) 50-12.5 mg per tablet Take 1 tablet by mouth once daily.    medroxyPROGESTERone (DEPO-PROVERA) 150 mg/mL Syrg     tolterodine (DETROL LA) 2 MG Cp24 Take 2 capsules (4 mg total) by mouth once daily.    traMADoL (ULTRAM) 50 mg tablet     traZODone (DESYREL) 50 MG tablet Take 1 tablet (50 mg total) by mouth every evening. Take 1/2 to 1 tab hs       SOCIAL HISTORY  Social History     Tobacco Use    Smoking status: Every Day     Types: Cigarettes    Smokeless tobacco: Never    Tobacco comments:     Pt states she vapes   Substance Use Topics    Alcohol use: Yes    Drug use: Not Currently       Living arrangements - the patient lives with their family.    ROS:    REVIEW OF SYMPTOMS 4/17/2023   Do you feel abnormally tired on most days? No   Do you feel you generally sleep well? Yes   Do you have difficulty controlling your bladder?  Yes   Do you have difficulty controlling your bowels?  Yes   Do you have frequent muscle cramps, tightness or spasms in your limbs?  Yes   Do you have new visual symptoms?  No   Do you have worsening difficulty with your memory or thinking? Yes   Do you have worsening symptoms of anxiety or depression?  No   For patients who walk, Do you have more difficulty walking?  Yes   Have you fallen since your last visit?  Yes   For patients who use wheelchairs: Do you have any skin wounds or breakdown? No   Do you have difficulty using your hands?  No   Do you have shooting or burning pain? No   Do you have difficulty with sexual function?  Yes   If you are sexually active, are you using birth control? Y/N  N/A Yes   Do you often choke when swallowing liquids or solid food?  No   Do you experience worsening symptoms when overheated? Yes   Do you need any new equipment such as a  wheelchair, walker or shower chair? No   Do you receive co-pay financial assistance for your principal MS medicine? Yes   Would you be interested in participating in an MS research trial in the future? No   For patients on Gilenya, Tecfidera, Aubagio, Rituxan, Ocrevus, Tysabri, Lemtrada or Methotrexate, are you aware that you should NOT receive live virus vaccines?  Yes   Do you feel you have adequate family/friend support?  Yes   Do you have health insurance?   Yes   Are you currently employed? Yes   Do you receive SSDI/SSI?  No   Do you use marijuana or cannabis products? No   How often? Monthly   Have you been diagnosed with a urinary tract infection since your last visit here? No   Have you been diagnosed with a respiratory tract infection since your last visit here? No   Have you been to the emergency room since your last visit here? No   Have you been hospitalized since your last visit here?  No            Objective:        1. 25 foot timed walk:  Timed 25 Foot Walk: 8/15/2022 4/18/2023   Did patient wear an AFO? No No   Was assistive device used? Yes Yes   Assistive device used (víctor one): Bilateral Assistance Bilateral Assistance   Bilateral device used Walker/Rollator Walker/Rollator   Time for 25 Foot Walk (seconds) 12.5 18.8   Time for 25 Foot Walk (seconds) - 17.1     Neurologic Exam    MENTAL STATUS: intact     CRANIAL NERVE EXAM:  There is no BELIA.  Extraocular muscles are intact.  No facial asymmetry.There is no dysarthria.      MOTOR EXAM: slow RSM bilaterally;  4/5 IO bilaterally and poor  strength bilaterally;  o/w 5/5 in other UE groups;   Right HF 3/5, KF 4/5, DF 4+/5; LLE 4+/5 in all flexor groups     REFLEXES: 3+ and symmetric throughout in all four extremities      SENSORY EXAM: nearly normal vibration in the UE and LE      COORDINATION: Normal finger-to-nose exam, slow RSM in right UE compared to left; RSM in feet bilaterally are difficult; heel to shin is difficult (right harder than  left)    Romberg positive; tandem walking is very difficult      GAIT: wide based and slow; drags right leg; uses rollator   Imaging:     Results for orders placed during the hospital encounter of 06/28/21    MRI Brain Demyelinating Without Contrast    Impression  Stable exam compared to 06/24/2020.  Findings compatible with multiple sclerosis.  No lesions demonstrating diffusion restriction to suggest active demyelination.  Stable plaque burden with no new lesions identified.      Electronically signed by: Jovanny Neal MD  Date:    06/28/2021  Time:    08:34      Results for orders placed during the hospital encounter of 07/08/22    MRI Brain Demyelinating W W/O Contrast    Impression  Brain and cervical spine appear stable from prior exam, again demonstrating findings compatible with the reported history of multiple sclerosis.  No new or enhancing lesions in these regions to indicate ongoing or active demyelination.    Several additional small nonenhancing demyelinating lesions within the thoracic spinal cord.  No prior studies available for comparison to assess for stability.    Mild cervical and thoracic spondylosis as above.    Cholelithiasis.    Electronically signed by resident: Travon Mckeon  Date:    07/08/2022  Time:    13:12    Electronically signed by: Jl Watters MD  Date:    07/08/2022  Time:    14:31    Results for orders placed during the hospital encounter of 07/08/22    MRI Cervical Spine Demyelinating W W/O Contrast    Impression  Brain and cervical spine appear stable from prior exam, again demonstrating findings compatible with the reported history of multiple sclerosis.  No new or enhancing lesions in these regions to indicate ongoing or active demyelination.    Several additional small nonenhancing demyelinating lesions within the thoracic spinal cord.  No prior studies available for comparison to assess for stability.    Mild cervical and thoracic spondylosis as  above.    Cholelithiasis.    Electronically signed by resident: Travon Mckeon  Date:    07/08/2022  Time:    13:12    Electronically signed by: Jl Watters MD  Date:    07/08/2022  Time:    14:31    Results for orders placed during the hospital encounter of 07/08/22    MRI Thoracic Spine Demyelinating W W/O Contrast    Impression  Brain and cervical spine appear stable from prior exam, again demonstrating findings compatible with the reported history of multiple sclerosis.  No new or enhancing lesions in these regions to indicate ongoing or active demyelination.    Several additional small nonenhancing demyelinating lesions within the thoracic spinal cord.  No prior studies available for comparison to assess for stability.    Mild cervical and thoracic spondylosis as above.    Cholelithiasis.    Electronically signed by resident: Travon Mckeon  Date:    07/08/2022  Time:    13:12    Electronically signed by: Jl Watters MD  Date:    07/08/2022  Time:    14:31        Labs:     Lab Results   Component Value Date    VWKFEMWQ37OO 61 02/10/2023    RZAICXBG31QX 37 10/16/2019     Lab Results   Component Value Date    SE1AGYLQ 70.4 01/06/2020    ABSOLUTECD3 1843.0 01/06/2020    AD8VPCHC 15.0 01/06/2020    ABSOLUTECD8 393.0 01/06/2020    VY4ZGMOH 55.5 01/06/2020    ABSOLUTECD4 1453.0 (H) 01/06/2020    LABCD48 3.7 (H) 01/06/2020     Lab Results   Component Value Date    WBC 15.78 (H) 02/10/2023    RBC 4.81 02/10/2023    HGB 10.2 (L) 02/10/2023    HCT 34.2 (L) 02/10/2023    MCV 71 (L) 02/10/2023    MCH 21.2 (L) 02/10/2023    MCHC 29.8 (L) 02/10/2023    RDW 20.3 (H) 02/10/2023     02/10/2023    MPV 10.3 02/10/2023    GRAN 11.4 (H) 02/10/2023    GRAN 72.1 02/10/2023    LYMPH 3.1 02/10/2023    LYMPH 19.8 02/10/2023    MONO 1.0 02/10/2023    MONO 6.1 02/10/2023    EOS 0.2 02/10/2023    BASO 0.11 02/10/2023    EOSINOPHIL 1.0 02/10/2023    BASOPHIL 0.7 02/10/2023     Sodium   Date Value Ref Range Status   02/10/2023  141 136 - 145 mmol/L Final     Potassium   Date Value Ref Range Status   02/10/2023 3.6 3.5 - 5.1 mmol/L Final     Chloride   Date Value Ref Range Status   02/10/2023 111 (H) 95 - 110 mmol/L Final     CO2   Date Value Ref Range Status   02/10/2023 19 (L) 23 - 29 mmol/L Final     Glucose   Date Value Ref Range Status   02/10/2023 66 (L) 70 - 110 mg/dL Final     BUN   Date Value Ref Range Status   02/10/2023 15 6 - 20 mg/dL Final     Creatinine   Date Value Ref Range Status   02/10/2023 0.7 0.5 - 1.4 mg/dL Final     Calcium   Date Value Ref Range Status   02/10/2023 9.4 8.7 - 10.5 mg/dL Final     Total Protein   Date Value Ref Range Status   02/10/2023 7.0 6.0 - 8.4 g/dL Final     Albumin   Date Value Ref Range Status   02/10/2023 3.8 3.5 - 5.2 g/dL Final     Total Bilirubin   Date Value Ref Range Status   02/10/2023 0.3 0.1 - 1.0 mg/dL Final     Comment:     For infants and newborns, interpretation of results should be based  on gestational age, weight and in agreement with clinical  observations.    Premature Infant recommended reference ranges:  Up to 24 hours.............<8.0 mg/dL  Up to 48 hours............<12.0 mg/dL  3-5 days..................<15.0 mg/dL  6-29 days.................<15.0 mg/dL       Alkaline Phosphatase   Date Value Ref Range Status   02/10/2023 68 55 - 135 U/L Final     AST   Date Value Ref Range Status   02/10/2023 16 10 - 40 U/L Final     ALT   Date Value Ref Range Status   02/10/2023 15 10 - 44 U/L Final     Anion Gap   Date Value Ref Range Status   02/10/2023 11 8 - 16 mmol/L Final     eGFR if    Date Value Ref Range Status   01/09/2020 >60.0 >60 mL/min/1.73 m^2 Final     eGFR if non    Date Value Ref Range Status   01/09/2020 >60.0 >60 mL/min/1.73 m^2 Final     Comment:     Calculation used to obtain the estimated glomerular filtration  rate (eGFR) is the CKD-EPI equation.        Lab Results   Component Value Date    HEPBSAG Non-reactive 02/10/2023     HEPBSAB [place] 08/15/2022    HEPBCAB Non-reactive 02/10/2023           MS Impression and Plan:     NEURO MULTIPLE SCLEROSIS IMPRESSION:   MS Status:     Number of relapses in the past year?:  0    Clinical Progression:  Worsened    Clinical Progression comment:  Her walk time is significant slower today. I recommend that she go back to physical therapy, and I will explore options for neuro PT in her area. She would like to go elsewhere besides [facility].   Plan:     DMT:  No change in management    DMT comment:  Continue Ocrevus and Vitamin D. Her next infusion is due in September. We will check safety labs in August. She is aware of the risks associated with immunosuppressant therapy, including increased risk of infection.       Symptom Management:  Implement change in symptom management    Implement Change in Symptom Management:  Fatigue and Gait       Given that Vik's walk has slowed and worsened, I am in favor of her remaining out of work until we see her back in July. I recommend that she continue to take disability from work at this time.   We will proceed with physical therapy and work on overall gait/balance/endurance. New rx for dalfampridine was sent to [facility] to help with walking speed. RbDm=600iy/min. We will also try nuvigil 50mg daily for fatigue.     I will see her back in July to reassess.     Total time spent with patient: 38 minutes   Total time spent on encounter: 58 minutes        SHANON Slater, CNS    Problem List Items Addressed This Visit    None  Visit Diagnoses       Multiple sclerosis    -  Primary    Relevant Medications    dalfampridine 10 mg Tb12    armodafiniL (NUVIGIL) 50 mg tablet    Other Relevant Orders    CBC Auto Differential    Comprehensive Metabolic Panel    Hepatitis B Core Antibody, Total    Hepatitis B Surface Antigen    Immunoglobulins (IgG, IgA, IgM) Quantitative    Vitamin D    Gait disturbance        Relevant Medications    dalfampridine 10  mg Tb12    Fatigue, unspecified type        Relevant Medications    armodafiniL (NUVIGIL) 50 mg tablet

## 2023-04-24 ENCOUNTER — TELEPHONE (OUTPATIENT)
Dept: NEUROLOGY | Facility: CLINIC | Age: 48
End: 2023-04-24
Payer: MEDICAID

## 2023-04-24 NOTE — TELEPHONE ENCOUNTER
ANILA DEL CASTILLO Key: [id number] - PA Case ID: [id number] - Rx #: [id number]    PA for Nuvigil submitted via CM on 4/24

## 2023-05-02 ENCOUNTER — PATIENT MESSAGE (OUTPATIENT)
Dept: PSYCHIATRY | Facility: CLINIC | Age: 48
End: 2023-05-02
Payer: MEDICAID

## 2023-05-09 ENCOUNTER — PATIENT MESSAGE (OUTPATIENT)
Dept: NEUROLOGY | Facility: CLINIC | Age: 48
End: 2023-05-09
Payer: MEDICAID

## 2023-06-27 ENCOUNTER — PATIENT MESSAGE (OUTPATIENT)
Dept: PHYSICAL MEDICINE AND REHAB | Facility: CLINIC | Age: 48
End: 2023-06-27
Payer: MEDICAID

## 2023-07-10 ENCOUNTER — HOSPITAL ENCOUNTER (OUTPATIENT)
Dept: RADIOLOGY | Facility: HOSPITAL | Age: 48
Discharge: HOME | End: 2023-07-10
Attending: PSYCHIATRY & NEUROLOGY
Payer: MEDICAID

## 2023-07-10 DIAGNOSIS — G35 MULTIPLE SCLEROSIS: ICD-10-CM

## 2023-07-10 PROCEDURE — 72141 MRI NECK SPINE W/O DYE: CPT | Mod: 26,,, | Performed by: RADIOLOGY

## 2023-07-10 PROCEDURE — 70551 MRI BRAIN STEM W/O DYE: CPT | Mod: TC

## 2023-07-10 PROCEDURE — 72141 MRI NECK SPINE W/O DYE: CPT | Mod: TC

## 2023-07-10 PROCEDURE — 72141 MRI CERVICAL SPINE DEMYELINATING WITHOUT CONTRAST: ICD-10-PCS | Mod: 26,,, | Performed by: RADIOLOGY

## 2023-07-10 PROCEDURE — 70551 MRI BRAIN DEMYELINATING WITHOUT CONTRAST: ICD-10-PCS | Mod: 26,,, | Performed by: RADIOLOGY

## 2023-07-10 PROCEDURE — 70551 MRI BRAIN STEM W/O DYE: CPT | Mod: 26,,, | Performed by: RADIOLOGY

## 2023-07-21 ENCOUNTER — TELEPHONE (OUTPATIENT)
Dept: PSYCHIATRY | Facility: CLINIC | Age: 48
End: 2023-07-21
Payer: MEDICAID

## 2023-07-21 ENCOUNTER — OFFICE VISIT (OUTPATIENT)
Dept: NEUROLOGY | Facility: CLINIC | Age: 48
End: 2023-07-21
Payer: MEDICAID

## 2023-07-21 ENCOUNTER — PATIENT MESSAGE (OUTPATIENT)
Dept: PSYCHIATRY | Facility: CLINIC | Age: 48
End: 2023-07-21
Payer: MEDICAID

## 2023-07-21 VITALS
WEIGHT: 212.63 LBS | HEART RATE: 98 BPM | DIASTOLIC BLOOD PRESSURE: 69 MMHG | HEIGHT: 64 IN | BODY MASS INDEX: 36.3 KG/M2 | SYSTOLIC BLOOD PRESSURE: 100 MMHG

## 2023-07-21 DIAGNOSIS — Z79.899 HIGH RISK MEDICATION USE: ICD-10-CM

## 2023-07-21 DIAGNOSIS — R53.83 FATIGUE, UNSPECIFIED TYPE: ICD-10-CM

## 2023-07-21 DIAGNOSIS — Z71.89 COUNSELING REGARDING GOALS OF CARE: ICD-10-CM

## 2023-07-21 DIAGNOSIS — M62.838 MUSCLE SPASM: ICD-10-CM

## 2023-07-21 DIAGNOSIS — Z29.89 PROPHYLACTIC IMMUNOTHERAPY: ICD-10-CM

## 2023-07-21 DIAGNOSIS — R26.9 GAIT DISTURBANCE: ICD-10-CM

## 2023-07-21 DIAGNOSIS — G35 MULTIPLE SCLEROSIS: Primary | ICD-10-CM

## 2023-07-21 PROCEDURE — 3074F SYST BP LT 130 MM HG: CPT | Mod: CPTII,,, | Performed by: CLINICAL NURSE SPECIALIST

## 2023-07-21 PROCEDURE — 99215 OFFICE O/P EST HI 40 MIN: CPT | Mod: S$PBB,,, | Performed by: CLINICAL NURSE SPECIALIST

## 2023-07-21 PROCEDURE — 1159F PR MEDICATION LIST DOCUMENTED IN MEDICAL RECORD: ICD-10-PCS | Mod: CPTII,,, | Performed by: CLINICAL NURSE SPECIALIST

## 2023-07-21 PROCEDURE — 3008F BODY MASS INDEX DOCD: CPT | Mod: CPTII,,, | Performed by: CLINICAL NURSE SPECIALIST

## 2023-07-21 PROCEDURE — 99215 OFFICE O/P EST HI 40 MIN: CPT | Mod: PBBFAC | Performed by: CLINICAL NURSE SPECIALIST

## 2023-07-21 PROCEDURE — 99215 PR OFFICE/OUTPT VISIT, EST, LEVL V, 40-54 MIN: ICD-10-PCS | Mod: S$PBB,,, | Performed by: CLINICAL NURSE SPECIALIST

## 2023-07-21 PROCEDURE — 3078F PR MOST RECENT DIASTOLIC BLOOD PRESSURE < 80 MM HG: ICD-10-PCS | Mod: CPTII,,, | Performed by: CLINICAL NURSE SPECIALIST

## 2023-07-21 PROCEDURE — 3078F DIAST BP <80 MM HG: CPT | Mod: CPTII,,, | Performed by: CLINICAL NURSE SPECIALIST

## 2023-07-21 PROCEDURE — 99999 PR PBB SHADOW E&M-EST. PATIENT-LVL V: ICD-10-PCS | Mod: PBBFAC,,, | Performed by: CLINICAL NURSE SPECIALIST

## 2023-07-21 PROCEDURE — 3008F PR BODY MASS INDEX (BMI) DOCUMENTED: ICD-10-PCS | Mod: CPTII,,, | Performed by: CLINICAL NURSE SPECIALIST

## 2023-07-21 PROCEDURE — 3074F PR MOST RECENT SYSTOLIC BLOOD PRESSURE < 130 MM HG: ICD-10-PCS | Mod: CPTII,,, | Performed by: CLINICAL NURSE SPECIALIST

## 2023-07-21 PROCEDURE — 1159F MED LIST DOCD IN RCRD: CPT | Mod: CPTII,,, | Performed by: CLINICAL NURSE SPECIALIST

## 2023-07-21 PROCEDURE — 99999 PR PBB SHADOW E&M-EST. PATIENT-LVL V: CPT | Mod: PBBFAC,,, | Performed by: CLINICAL NURSE SPECIALIST

## 2023-07-21 RX ORDER — OCRELIZUMAB 300 MG/10ML
INJECTION INTRAVENOUS
COMMUNITY
Start: 2023-03-16

## 2023-07-21 RX ORDER — DALFAMPRIDINE 10 MG/1
10 TABLET, FILM COATED, EXTENDED RELEASE ORAL EVERY 12 HOURS
Qty: 60 TABLET | Refills: 2 | Status: SHIPPED | OUTPATIENT
Start: 2023-07-21 | End: 2023-09-23 | Stop reason: SDUPTHER

## 2023-07-21 RX ORDER — ARMODAFINIL 50 MG/1
50 TABLET ORAL DAILY
Qty: 30 TABLET | Refills: 2 | Status: SHIPPED | OUTPATIENT
Start: 2023-07-21 | End: 2023-10-19

## 2023-07-21 RX ORDER — BACLOFEN 20 MG/1
20 TABLET ORAL NIGHTLY
Qty: 90 TABLET | Refills: 3 | Status: SHIPPED | OUTPATIENT
Start: 2023-07-21

## 2023-07-21 NOTE — TELEPHONE ENCOUNTER
Sw spoke to pt and gave number for [place] chapter [employer].   Sw and pt discussed pt's FMLA - sw informed pt that FMLA only accommodates 12 weeks of leave, of which she has already taken 11 weeks. Sw inquired about LTD and pt stated that she has LTD benefits through her employer. Pt will contact her employer today.  Sw will provide letter supporting extension of leave while she applies for LTD.

## 2023-07-21 NOTE — PATIENT INSTRUCTIONS
Nuvigil (armodafinil)--start by taking 50mg daily, and do this for a week. If not helpful, you can try taking 2 tablets. Message Daisy if increased dosage is needed, and she will change directions.     [email or web address]; set up account using your email address.     Reminder to get a pill case and set out medications for the week.

## 2023-07-21 NOTE — TELEPHONE ENCOUNTER
----- Message from SHANON Joseph, CNS sent at 7/21/2023 12:30 PM CDT -----  Can someone send her a good contact number for [employer] so she can follow up on scooter status?     Also, please see secure chat with request to send in [employer] forms to extend short term disability with her employer.

## 2023-07-21 NOTE — PROGRESS NOTES
Subjective:          Patient ID: Vik May is a 47 y.o. female who presents today for a routine clinic visit for MS.  She was last seen in April 2023. The history has been provided by the patient.       MS HPI:  DMT: Ocrevus--last infused in March; due in September  Side effects from DMT? No  Taking vitamin D3 as recommended? Yes -  Dose:   She is getting short-term disability through work right now and is supposed to go back to work tomorrow.   Her social security disability application is pending. She has a disability .   In the past 3 months, she feels like her walking is getting worse. She walks better with the rollator as opposed to the cane. She continues to fall regularly, mostly when using the cane and losing balance. She is sometimes able to get up on her own, but often requires help from her son or .   She thinks she may have had a UTI since the last visit. She was not treated. She denies any current bladder symptoms.   She has been trying to avoid the heat. When she gets overheated, it makes her walking worse. When she was working, she was required to be in the heat at times.   She has bowel urgency, and she has had 1 accident.   Detrol is helpful for her bladder.   She has not been taking dalfampridine for walking.   Her mother passed away in June.      Medications:  Current Outpatient Medications   Medication Sig    acetaminophen (TYLENOL) 325 MG tablet Take 650 mg by mouth every 6 (six) hours as needed.    aspirin-caffeine 845-65 mg PwPk Take by mouth.    baclofen (LIORESAL) 20 MG tablet Take 1 tablet (20 mg total) by mouth every evening.    cetirizine (ZYRTEC) 10 MG tablet Take 10 mg by mouth once daily.    cholecalciferol, vitamin D3, (VITAMIN D3) 2,000 unit Cap Take 2 tablets by mouth.    dalfampridine 10 mg Tb12 Take 1 tablet (10 mg total) by mouth every 12 (twelve) hours.    esomeprazole (NEXIUM) 40 MG capsule Take 40 mg by mouth once daily.    ferrous sulfate (FEOSOL) 325 mg (65 mg  iron) Tab tablet Take by mouth 2 (two) times daily.    fluticasone propionate (FLONASE) 50 mcg/actuation nasal spray by Each Nostril route.    losartan-hydrochlorothiazide 100-25 mg (HYZAAR) 100-25 mg per tablet Take 1 tablet by mouth once daily.    losartan-hydrochlorothiazide 50-12.5 mg (HYZAAR) 50-12.5 mg per tablet Take 1 tablet by mouth once daily.    tolterodine (DETROL LA) 2 MG Cp24 Take 2 capsules (4 mg total) by mouth once daily.    traZODone (DESYREL) 50 MG tablet Take 1 tablet (50 mg total) by mouth every evening. Take 1/2 to 1 tab hs       SOCIAL HISTORY  Social History     Tobacco Use    Smoking status: Every Day     Types: Cigarettes    Smokeless tobacco: Never    Tobacco comments:     Pt states she vapes   Substance Use Topics    Alcohol use: Yes    Drug use: Not Currently       Living arrangements - the patient lives with her family     ROS:  REVIEW OF SYMPTOMS 7/15/2023   Do you feel abnormally tired on most days? Yes   Do you feel you generally sleep well? No   Do you have difficulty controlling your bladder?  Yes--as above    Do you have difficulty controlling your bowels?  Yes--as above    Do you have frequent muscle cramps, tightness or spasms in your limbs?  Yes--takes baclofen    Do you have new visual symptoms?  No--wears glasses all the time    Do you have worsening difficulty with your memory or thinking? Yes--has noticed short-term memory changes; has trouble sometimes remembering to take her medications.    Do you have worsening symptoms of anxiety or depression?  Yes--She sees a counselor once a week; she has been more irritable lately.    For patients who walk, Do you have more difficulty walking?  Yes   Have you fallen since your last visit?  Yes   For patients who use wheelchairs: Do you have any skin wounds or breakdown? Not Applicable   Do you have difficulty using your hands?  No   Do you have shooting or burning pain? No   Do you have difficulty with sexual function?  Yes   If you  are sexually active, are you using birth control? Y/N  N/A No   Do you often choke when swallowing liquids or solid food?  Yes   Do you experience worsening symptoms when overheated? Yes   Do you need any new equipment such as a wheelchair, walker or shower chair? No   Do you receive co-pay financial assistance for your principal MS medicine? Yes   Would you be interested in participating in an MS research trial in the future? No   For patients on Gilenya, Tecfidera, Aubagio, Rituxan, Ocrevus, Tysabri, Lemtrada or Methotrexate, are you aware that you should NOT receive live virus vaccines?  Yes   Do you feel you have adequate family/friend support?  Yes   Do you have health insurance?   Yes   Are you currently employed? Yes   Do you receive SSDI/SSI?  No   Do you use marijuana or cannabis products? Yes   How often? Daily   Have you been diagnosed with a urinary tract infection since your last visit here? No   Have you been diagnosed with a respiratory tract infection since your last visit here? No   Have you been to the emergency room since your last visit here? No   Have you been hospitalized since your last visit here?  No              Objective:        1. 25 foot timed walk:  Timed 25 Foot Walk: 4/18/2023 7/21/2023   Did patient wear an AFO? No No   Was assistive device used? Yes Yes   Assistive device used (víctor one): Bilateral Assistance Bilateral Assistance   Bilateral device used Walker/Rollator Walker/Rollator   Time for 25 Foot Walk (seconds) 18.8 18.2   Time for 25 Foot Walk (seconds) 17.1 15.4     Neurologic Exam    MENTAL STATUS: intact     CRANIAL NERVE EXAM:  There is no BELIA.  Extraocular muscles are intact.  No facial asymmetry.There is no dysarthria.      MOTOR EXAM: slow RSM bilaterally;  4/5 IO bilaterally and poor  strength bilaterally;  o/w 5/5 in other UE groups;   Right HF 3/5, KF 4/5, DF 4+/5; LLE 4+/5 in all flexor groups     REFLEXES: 2+ and symmetric throughout in all four extremities       SENSORY EXAM: normal vibration in the UE and LE      COORDINATION: Normal finger-to-nose exam, slow RSM in right UE compared to left; RSM in feet bilaterally are difficult; heel to shin is difficult (right harder than left)     Romberg positive    No major changes compared to last visit     Imaging:     Results for orders placed during the hospital encounter of 07/10/23    MRI Brain Demyelinating Without Contrast    Impression  Stable intracranial white matter lesions.  No new lesion identified      Electronically signed by: Zac Harrell  Date:    07/10/2023  Time:    11:19    Results for orders placed during the hospital encounter of 07/10/23    MRI Cervical Spine Demyelinating Without Contrast    Impression  Stable lesions within the cervical spinal cord with no new lesion identified.  Stable mild degenerative changes.      Electronically signed by: Zca Harrell  Date:    07/10/2023  Time:    11:29    Labs:     Lab Results   Component Value Date    OEZNAPHI43TP 61 02/10/2023    PGZUHUBY82GH 37 10/16/2019       Lab Results   Component Value Date    FQ6JMDZX 70.4 01/06/2020    ABSOLUTECD3 1843.0 01/06/2020    EK7CQYVT 15.0 01/06/2020    ABSOLUTECD8 393.0 01/06/2020    KL8LMMRF 55.5 01/06/2020    ABSOLUTECD4 1453.0 (H) 01/06/2020    LABCD48 3.7 (H) 01/06/2020     Lab Results   Component Value Date    WBC 15.78 (H) 02/10/2023    RBC 4.81 02/10/2023    HGB 10.2 (L) 02/10/2023    HCT 34.2 (L) 02/10/2023    MCV 71 (L) 02/10/2023    MCH 21.2 (L) 02/10/2023    MCHC 29.8 (L) 02/10/2023    RDW 20.3 (H) 02/10/2023     02/10/2023    MPV 10.3 02/10/2023    GRAN 11.4 (H) 02/10/2023    GRAN 72.1 02/10/2023    LYMPH 3.1 02/10/2023    LYMPH 19.8 02/10/2023    MONO 1.0 02/10/2023    MONO 6.1 02/10/2023    EOS 0.2 02/10/2023    BASO 0.11 02/10/2023    EOSINOPHIL 1.0 02/10/2023    BASOPHIL 0.7 02/10/2023     Sodium   Date Value Ref Range Status   02/10/2023 141 136 - 145 mmol/L Final     Potassium   Date Value Ref  Range Status   02/10/2023 3.6 3.5 - 5.1 mmol/L Final     Chloride   Date Value Ref Range Status   02/10/2023 111 (H) 95 - 110 mmol/L Final     CO2   Date Value Ref Range Status   02/10/2023 19 (L) 23 - 29 mmol/L Final     Glucose   Date Value Ref Range Status   02/10/2023 66 (L) 70 - 110 mg/dL Final     BUN   Date Value Ref Range Status   02/10/2023 15 6 - 20 mg/dL Final     Creatinine   Date Value Ref Range Status   02/10/2023 0.7 0.5 - 1.4 mg/dL Final     Calcium   Date Value Ref Range Status   02/10/2023 9.4 8.7 - 10.5 mg/dL Final     Total Protein   Date Value Ref Range Status   02/10/2023 7.0 6.0 - 8.4 g/dL Final     Albumin   Date Value Ref Range Status   02/10/2023 3.8 3.5 - 5.2 g/dL Final     Total Bilirubin   Date Value Ref Range Status   02/10/2023 0.3 0.1 - 1.0 mg/dL Final     Comment:     For infants and newborns, interpretation of results should be based  on gestational age, weight and in agreement with clinical  observations.    Premature Infant recommended reference ranges:  Up to 24 hours.............<8.0 mg/dL  Up to 48 hours............<12.0 mg/dL  3-5 days..................<15.0 mg/dL  6-29 days.................<15.0 mg/dL       Alkaline Phosphatase   Date Value Ref Range Status   02/10/2023 68 55 - 135 U/L Final     AST   Date Value Ref Range Status   02/10/2023 16 10 - 40 U/L Final     ALT   Date Value Ref Range Status   02/10/2023 15 10 - 44 U/L Final     Anion Gap   Date Value Ref Range Status   02/10/2023 11 8 - 16 mmol/L Final     eGFR if    Date Value Ref Range Status   01/09/2020 >60.0 >60 mL/min/1.73 m^2 Final     eGFR if non    Date Value Ref Range Status   01/09/2020 >60.0 >60 mL/min/1.73 m^2 Final     Comment:     Calculation used to obtain the estimated glomerular filtration  rate (eGFR) is the CKD-EPI equation.        Lab Results   Component Value Date    HEPBSAG Non-reactive 02/10/2023    HEPBSAB [place] 08/15/2022    HEPBCAB Non-reactive 02/10/2023            MS Impression and Plan:     NEURO MULTIPLE SCLEROSIS IMPRESSION:   MS Status:     Number of relapses in the past year?:  0    Clinical Progression:  Clinically Stable    Clinical Progression comment:  Vik's exam is unchanged from prior, but she continues to have challenges with walking and balance (frequent falls), fatigue, muscle spasms, and heat sensitivity. I am in favor of extending her short-term disability so that she can focus on physical therapyt and symptom management.   Plan:     DMT:  No change in management    DMT comment:  Continue Ocrevus and Vitamin D. Her next infusion is due in September. Labs are scheduled for August. She is aware of the risks associated with immunosuppressant therapy, including increased risk of infection.       Symptom Management:  Implement change in symptom management    Implement Change in Symptom Management:  Gait (PT order faxed to [facility]. Rx for dalfampridine sent to [facility]. She will create an account.)       Discussed using a pill case to help her remember her medications each day.   Will follow up with social workers re: short-term disability forms.     She will follow up with Dr. Maya in November or December.     Total time spent with patient: 47 minutes   Total time spent on encounter: 68 minutes         SHANON Slater, CNS    Problem List Items Addressed This Visit    None  Visit Diagnoses       Fatigue, unspecified type    -  Primary    Relevant Medications    armodafiniL (NUVIGIL) 50 mg tablet    Multiple sclerosis        Relevant Medications    baclofen (LIORESAL) 20 MG tablet    dalfampridine 10 mg Tb12    Other Relevant Orders    Ambulatory referral/consult to Physical/Occupational Therapy    Muscle spasm        Relevant Medications    baclofen (LIORESAL) 20 MG tablet    Gait disturbance        Relevant Medications    dalfampridine 10 mg Tb12

## 2023-07-21 NOTE — Clinical Note
Can someone send her a good contact number for [employer] so she can follow up on scooter status?   Also, please see secure chat with request to send in [employer] forms to extend short term disability with her employer.

## 2023-07-21 NOTE — LETTER
[facility] [address]  [address]  [facility] [address]  Phone: [phone]       2023    RE: Vik May (: 1975)    To whom it may concern,     Please let this letter serve as proof Ms. May's continued disability. After seeing her today in clinic, it is my professional opinion that her leave be extended until she obtains long term disability. If you have any questions or concerns, please feel free to contact my office at [phone].    Sincerely,             SHANON Duke, CNS

## 2023-07-24 ENCOUNTER — PATIENT MESSAGE (OUTPATIENT)
Dept: NEUROLOGY | Facility: CLINIC | Age: 48
End: 2023-07-24
Payer: MEDICAID

## 2023-07-25 NOTE — TELEPHONE ENCOUNTER
Sw received paperwork from pt via email. Sw will coordinate to have the 1. Health Care Provider Statement; 2. Accommodation Request Assessment Form; and 3. Physical Capacity Assessment Form completed, signed, and faxed to the [employer] at [phone]

## 2023-07-27 ENCOUNTER — DOCUMENTATION ONLY (OUTPATIENT)
Dept: NEUROLOGY | Facility: CLINIC | Age: 48
End: 2023-07-27
Payer: MEDICAID

## 2023-08-01 NOTE — TELEPHONE ENCOUNTER
Sw completed pt's std claim with [email or web address]. Sw contacted pt to inform her that it had been completed.   Sw also shared that her LTD paperwork was on hold until sw had complete job description.   Pt agreed to call her employer.

## 2023-08-11 ENCOUNTER — LAB VISIT (OUTPATIENT)
Dept: LAB | Facility: HOSPITAL | Age: 48
End: 2023-08-11
Attending: CLINICAL NURSE SPECIALIST
Payer: MEDICAID

## 2023-08-11 ENCOUNTER — TELEPHONE (OUTPATIENT)
Dept: NEUROLOGY | Facility: CLINIC | Age: 48
End: 2023-08-11
Payer: MEDICAID

## 2023-08-11 DIAGNOSIS — G35 MULTIPLE SCLEROSIS: ICD-10-CM

## 2023-08-11 LAB
25(OH)D3+25(OH)D2 SERPL-MCNC: 48 NG/ML (ref 30–96)
ALBUMIN SERPL BCP-MCNC: 3.6 G/DL (ref 3.5–5.2)
ALP SERPL-CCNC: 73 U/L (ref 55–135)
ALT SERPL W/O P-5'-P-CCNC: 13 U/L (ref 10–44)
ANION GAP SERPL CALC-SCNC: 14 MMOL/L (ref 8–16)
AST SERPL-CCNC: 17 U/L (ref 10–40)
BASOPHILS # BLD AUTO: 0.08 K/UL (ref 0–0.2)
BASOPHILS NFR BLD: 0.6 % (ref 0–1.9)
BILIRUB SERPL-MCNC: 0.3 MG/DL (ref 0.1–1)
BUN SERPL-MCNC: 12 MG/DL (ref 6–20)
CALCIUM SERPL-MCNC: 9.2 MG/DL (ref 8.7–10.5)
CHLORIDE SERPL-SCNC: 109 MMOL/L (ref 95–110)
CO2 SERPL-SCNC: 20 MMOL/L (ref 23–29)
CREAT SERPL-MCNC: 0.7 MG/DL (ref 0.5–1.4)
DIFFERENTIAL METHOD: ABNORMAL
EOSINOPHIL # BLD AUTO: 0.1 K/UL (ref 0–0.5)
EOSINOPHIL NFR BLD: 0.8 % (ref 0–8)
ERYTHROCYTE [DISTWIDTH] IN BLOOD BY AUTOMATED COUNT: 18.1 % (ref 11.5–14.5)
EST. GFR  (NO RACE VARIABLE): >60 ML/MIN/1.73 M^2
GLUCOSE SERPL-MCNC: 109 MG/DL (ref 70–110)
HBV CORE AB SERPL QL IA: NORMAL
HBV SURFACE AG SERPL QL IA: NORMAL
HCT VFR BLD AUTO: 35.7 % (ref 37–48.5)
HGB BLD-MCNC: 11 G/DL (ref 12–16)
IGA SERPL-MCNC: 106 MG/DL (ref 40–350)
IGG SERPL-MCNC: 971 MG/DL (ref 650–1600)
IGM SERPL-MCNC: 55 MG/DL (ref 50–300)
IMM GRANULOCYTES # BLD AUTO: 0.07 K/UL (ref 0–0.04)
IMM GRANULOCYTES NFR BLD AUTO: 0.5 % (ref 0–0.5)
LYMPHOCYTES # BLD AUTO: 2.5 K/UL (ref 1–4.8)
LYMPHOCYTES NFR BLD: 17.2 % (ref 18–48)
MCH RBC QN AUTO: 22.1 PG (ref 27–31)
MCHC RBC AUTO-ENTMCNC: 30.8 G/DL (ref 32–36)
MCV RBC AUTO: 72 FL (ref 82–98)
MONOCYTES # BLD AUTO: 0.6 K/UL (ref 0.3–1)
MONOCYTES NFR BLD: 4 % (ref 4–15)
NEUTROPHILS # BLD AUTO: 11 K/UL (ref 1.8–7.7)
NEUTROPHILS NFR BLD: 76.9 % (ref 38–73)
NRBC BLD-RTO: 0 /100 WBC
PLATELET # BLD AUTO: 404 K/UL (ref 150–450)
PMV BLD AUTO: 10.5 FL (ref 9.2–12.9)
POTASSIUM SERPL-SCNC: 3.4 MMOL/L (ref 3.5–5.1)
PROT SERPL-MCNC: 6.7 G/DL (ref 6–8.4)
RBC # BLD AUTO: 4.97 M/UL (ref 4–5.4)
SODIUM SERPL-SCNC: 143 MMOL/L (ref 136–145)
WBC # BLD AUTO: 14.27 K/UL (ref 3.9–12.7)

## 2023-08-11 PROCEDURE — 86704 HEP B CORE ANTIBODY TOTAL: CPT | Performed by: CLINICAL NURSE SPECIALIST

## 2023-08-11 PROCEDURE — 36415 COLL VENOUS BLD VENIPUNCTURE: CPT | Mod: PO | Performed by: CLINICAL NURSE SPECIALIST

## 2023-08-11 PROCEDURE — 82784 ASSAY IGA/IGD/IGG/IGM EACH: CPT | Performed by: CLINICAL NURSE SPECIALIST

## 2023-08-11 PROCEDURE — 87340 HEPATITIS B SURFACE AG IA: CPT | Performed by: CLINICAL NURSE SPECIALIST

## 2023-08-11 PROCEDURE — 80053 COMPREHEN METABOLIC PANEL: CPT | Performed by: CLINICAL NURSE SPECIALIST

## 2023-08-11 PROCEDURE — 82306 VITAMIN D 25 HYDROXY: CPT | Performed by: CLINICAL NURSE SPECIALIST

## 2023-08-11 PROCEDURE — 85025 COMPLETE CBC W/AUTO DIFF WBC: CPT | Performed by: CLINICAL NURSE SPECIALIST

## 2023-08-14 ENCOUNTER — TELEPHONE (OUTPATIENT)
Dept: NEUROLOGY | Facility: CLINIC | Age: 48
End: 2023-08-14
Payer: MEDICAID

## 2023-08-14 NOTE — TELEPHONE ENCOUNTER
----- Message from Daisy Moura, APRN, CNS sent at 8/12/2023  3:14 PM CDT -----  Low potassium  Negative hep B labs   Vit D=48  Normal immunoglobulins     Ok to proceed with infusion

## 2023-09-23 DIAGNOSIS — G35 MULTIPLE SCLEROSIS: ICD-10-CM

## 2023-09-23 DIAGNOSIS — R26.9 GAIT DISTURBANCE: ICD-10-CM

## 2023-09-25 RX ORDER — DALFAMPRIDINE 10 MG/1
10 TABLET, FILM COATED, EXTENDED RELEASE ORAL EVERY 12 HOURS
Qty: 60 TABLET | Refills: 4 | Status: SHIPPED | OUTPATIENT
Start: 2023-09-25 | End: 2024-03-23

## 2023-10-24 DIAGNOSIS — N31.9 NEUROGENIC BLADDER: ICD-10-CM

## 2023-10-25 RX ORDER — TOLTERODINE 2 MG/1
4 CAPSULE, EXTENDED RELEASE ORAL DAILY
Qty: 60 CAPSULE | Refills: 5 | Status: SHIPPED | OUTPATIENT
Start: 2023-10-25 | End: 2024-01-07 | Stop reason: SDUPTHER

## 2024-01-07 DIAGNOSIS — N31.9 NEUROGENIC BLADDER: ICD-10-CM

## 2024-01-07 DIAGNOSIS — G47.00 INSOMNIA, UNSPECIFIED TYPE: ICD-10-CM

## 2024-01-08 RX ORDER — TOLTERODINE 2 MG/1
4 CAPSULE, EXTENDED RELEASE ORAL DAILY
Qty: 180 CAPSULE | Refills: 1 | Status: SHIPPED | OUTPATIENT
Start: 2024-01-08

## 2024-01-08 RX ORDER — TRAZODONE HYDROCHLORIDE 50 MG/1
50 TABLET ORAL NIGHTLY
Qty: 90 TABLET | Refills: 1 | Status: SHIPPED | OUTPATIENT
Start: 2024-01-08

## 2024-01-19 ENCOUNTER — TELEPHONE (OUTPATIENT)
Dept: NEUROLOGY | Facility: CLINIC | Age: 49
End: 2024-01-19
Payer: MEDICAID

## 2024-01-22 ENCOUNTER — OFFICE VISIT (OUTPATIENT)
Dept: NEUROLOGY | Facility: CLINIC | Age: 49
End: 2024-01-22
Payer: MEDICAID

## 2024-01-22 ENCOUNTER — PATIENT MESSAGE (OUTPATIENT)
Dept: PSYCHIATRY | Facility: CLINIC | Age: 49
End: 2024-01-22
Payer: MEDICAID

## 2024-01-22 DIAGNOSIS — Z79.899 HIGH RISK MEDICATION USE: ICD-10-CM

## 2024-01-22 DIAGNOSIS — R53.83 FATIGUE, UNSPECIFIED TYPE: ICD-10-CM

## 2024-01-22 DIAGNOSIS — N31.9 NEUROGENIC DYSFUNCTION OF THE URINARY BLADDER: ICD-10-CM

## 2024-01-22 DIAGNOSIS — G35 MULTIPLE SCLEROSIS: Primary | ICD-10-CM

## 2024-01-22 DIAGNOSIS — R26.9 GAIT DISTURBANCE: ICD-10-CM

## 2024-01-22 DIAGNOSIS — Z71.89 COUNSELING REGARDING GOALS OF CARE: ICD-10-CM

## 2024-01-22 DIAGNOSIS — Z29.89 PROPHYLACTIC IMMUNOTHERAPY: ICD-10-CM

## 2024-01-22 PROCEDURE — 1159F MED LIST DOCD IN RCRD: CPT | Mod: CPTII,95,, | Performed by: CLINICAL NURSE SPECIALIST

## 2024-01-22 PROCEDURE — G2211 COMPLEX E/M VISIT ADD ON: HCPCS | Mod: 95,,, | Performed by: CLINICAL NURSE SPECIALIST

## 2024-01-22 PROCEDURE — 99215 OFFICE O/P EST HI 40 MIN: CPT | Mod: 95,,, | Performed by: CLINICAL NURSE SPECIALIST

## 2024-01-22 RX ORDER — ARMODAFINIL 50 MG/1
50 TABLET ORAL DAILY
Qty: 30 TABLET | Refills: 0 | Status: SHIPPED | OUTPATIENT
Start: 2024-01-22 | End: 2024-02-21

## 2024-01-22 NOTE — PROGRESS NOTES
Subjective:          Patient ID: Vik May is a 48 y.o. female who presents today for a routine virtual visit for MS.  She was last seen in July 2023. The history has been provided by the patient.     The patient location is: her vehicle, located in [place], [place]   The chief complaint leading to consultation is: MS     Visit type: audiovisual    Face to Face time with patient: 27 minutes   40 minutes of total time spent on the encounter, which includes face to face time and non-face to face time preparing to see the patient (eg, review of tests), Obtaining and/or reviewing separately obtained history, Documenting clinical information in the electronic or other health record, Independently interpreting results (not separately reported) and communicating results to the patient/family/caregiver, or Care coordination (not separately reported).       Each patient to whom he or she provides medical services by telemedicine is:  (1) informed of the relationship between the physician and patient and the respective role of any other health care provider with respect to management of the patient; and (2) notified that he or she may decline to receive medical services by telemedicine and may withdraw from such care at any time.    MS HPI:  DMT: Ocrevus   Side effects from DMT? No  Taking vitamin D3 as recommended? Yes   She has not heard any updates on SSDI since before Christmas.   She had been in PT for 2 months and felt like she was walking better, but then her insurance denied further visits. She has not done PT since roughly October. Since she has been out of PT, her walking has declined again. She cannot walk long distances. She has had 4-5 falls in the last few months. She had a fall last week, but thinks she slipped on ice.   She has been out of armodafinil--was told no refills. She has also not been able to  Detrol (might need PA).   Her father passed away in August; mother passed in June.   She is just as  sensitive to cold as heat. The cold makes her feel stiffer, and she has more pain.   She has been unable to hold her bladder for the last few weeks. She has had trouble getting to the bathroom. She does feel like she is emptying completely. She denies any obvious UTIs.  It's been a while since she has taken Detrol.   She feels like she is choking more on food and saliva.    She sometimes says a different word than what she is thinking.   She went to urgent care recently for a sinus infection. She was prescribed antibiotics, but she continues to have a cough. She denies any recent fevers.   Her energy level has not been good at all because she has not been able to get armodafinil. She is generally sleeping well.   She feels like her glasses prescription is not correct. She sees better with readers.   She feels like she texts more slowly, and her handwriting is not as good as it used to be. She also has trouble opening jars sometimes.   She is currently walking with a walker. She is using the walker at all times around the house. She purchased her own scooter and uses this when she is out and walking longer distances.         Medications:  Current Outpatient Medications   Medication Sig    acetaminophen (TYLENOL) 325 MG tablet Take 650 mg by mouth every 6 (six) hours as needed.    aspirin-caffeine 845-65 mg PwPk Take by mouth.    baclofen (LIORESAL) 20 MG tablet Take 1 tablet (20 mg total) by mouth every evening.    cetirizine (ZYRTEC) 10 MG tablet Take 10 mg by mouth once daily.    cholecalciferol, vitamin D3, (VITAMIN D3) 2,000 unit Cap Take 2 tablets by mouth.    dalfampridine 10 mg Tb12 Take 1 tablet (10 mg total) by mouth every 12 (twelve) hours.    esomeprazole (NEXIUM) 40 MG capsule Take 40 mg by mouth once daily.    ferrous sulfate (FEOSOL) 325 mg (65 mg iron) Tab tablet Take by mouth 2 (two) times daily.    fluticasone propionate (FLONASE) 50 mcg/actuation nasal spray by Each Nostril route.     losartan-hydrochlorothiazide 100-25 mg (HYZAAR) 100-25 mg per tablet Take 1 tablet by mouth once daily.    losartan-hydrochlorothiazide 50-12.5 mg (HYZAAR) 50-12.5 mg per tablet Take 1 tablet by mouth once daily.    OCREVUS 30 mg/mL Soln Inject into the vein.    tolterodine (DETROL [place]) 2 MG Cp24 Take 2 capsules (4 mg total) by mouth once daily.    traZODone (DESYREL) 50 MG tablet Take 1 tablet (50 mg total) by mouth every evening. Take 1/2 to 1 tab hs     SOCIAL HISTORY  Social History     Tobacco Use    Smoking status: Every Day     Types: Cigarettes    Smokeless tobacco: Never    Tobacco comments:     Pt states she vapes   Substance Use Topics    Alcohol use: Yes    Drug use: Not Currently       Living arrangements - the patient lives with her family..             Objective:        1. 25 foot timed walk:      4/18/2023    12:01 AM 7/21/2023    12:01 AM   Timed 25 Foot Walk:   Did patient wear an AFO? No No   Was assistive device used? Yes Yes   Assistive device used (víctor one): Bilateral Assistance Bilateral Assistance   Bilateral device used Walker/Rollator Walker/Rollator   Time for 25 Foot Walk (seconds) 18.8 18.2   Time for 25 Foot Walk (seconds) 17.1 15.4     Neurologic Exam     Mental Status   Oriented to person, place, and time.   Attention: normal. Concentration: normal.   Speech: speech is normal   Level of consciousness: alert  Knowledge: good.   Normal comprehension.     Cranial Nerves     CN VIII   Hearing: intact        Imaging:     Results for orders placed during the hospital encounter of 07/10/23    MRI Brain Demyelinating Without Contrast    Impression  Stable intracranial white matter lesions.  No new lesion identified      Electronically signed by: Zac Harrell  Date:    07/10/2023  Time:    11:19    Results for orders placed during the hospital encounter of 07/10/23    MRI Cervical Spine Demyelinating Without Contrast    Impression  Stable lesions within the cervical spinal cord with  no new lesion identified.  Stable mild degenerative changes.      Electronically signed by: Zac Harrell  Date:    07/10/2023  Time:    11:29      Labs:     Lab Results   Component Value Date    XRSBNOFU19FU 48 08/11/2023    RRSCDGXC06PM 61 02/10/2023    HILRMYMY19NN 37 10/16/2019       Lab Results   Component Value Date    VE2JLDMK 70.4 01/06/2020    ABSOLUTECD3 1843.0 01/06/2020    EV1BROBR 15.0 01/06/2020    ABSOLUTECD8 393.0 01/06/2020    QA5UCCNI 55.5 01/06/2020    ABSOLUTECD4 1453.0 (H) 01/06/2020    LABCD48 3.7 (H) 01/06/2020     Lab Results   Component Value Date    WBC 14.27 (H) 08/11/2023    RBC 4.97 08/11/2023    HGB 11.0 (L) 08/11/2023    HCT 35.7 (L) 08/11/2023    MCV 72 (L) 08/11/2023    MCH 22.1 (L) 08/11/2023    MCHC 30.8 (L) 08/11/2023    RDW 18.1 (H) 08/11/2023     08/11/2023    MPV 10.5 08/11/2023    GRAN 11.0 (H) 08/11/2023    GRAN 76.9 (H) 08/11/2023    LYMPH 2.5 08/11/2023    LYMPH 17.2 (L) 08/11/2023    MONO 0.6 08/11/2023    MONO 4.0 08/11/2023    EOS 0.1 08/11/2023    BASO 0.08 08/11/2023    EOSINOPHIL 0.8 08/11/2023    BASOPHIL 0.6 08/11/2023     Sodium   Date Value Ref Range Status   08/11/2023 143 136 - 145 mmol/L Final     Potassium   Date Value Ref Range Status   08/11/2023 3.4 (L) 3.5 - 5.1 mmol/L Final     Chloride   Date Value Ref Range Status   08/11/2023 109 95 - 110 mmol/L Final     CO2   Date Value Ref Range Status   08/11/2023 20 (L) 23 - 29 mmol/L Final     Glucose   Date Value Ref Range Status   08/11/2023 109 70 - 110 mg/dL Final     BUN   Date Value Ref Range Status   08/11/2023 12 6 - 20 mg/dL Final     Creatinine   Date Value Ref Range Status   08/11/2023 0.7 0.5 - 1.4 mg/dL Final     Calcium   Date Value Ref Range Status   08/11/2023 9.2 8.7 - 10.5 mg/dL Final     Total Protein   Date Value Ref Range Status   08/11/2023 6.7 6.0 - 8.4 g/dL Final     Albumin   Date Value Ref Range Status   08/11/2023 3.6 3.5 - 5.2 g/dL Final     Total Bilirubin   Date Value Ref  Range Status   08/11/2023 0.3 0.1 - 1.0 mg/dL Final     Comment:     For infants and newborns, interpretation of results should be based  on gestational age, weight and in agreement with clinical  observations.    Premature Infant recommended reference ranges:  Up to 24 hours.............<8.0 mg/dL  Up to 48 hours............<12.0 mg/dL  3-5 days..................<15.0 mg/dL  6-29 days.................<15.0 mg/dL       Alkaline Phosphatase   Date Value Ref Range Status   08/11/2023 73 55 - 135 U/L Final     AST   Date Value Ref Range Status   08/11/2023 17 10 - 40 U/L Final     ALT   Date Value Ref Range Status   08/11/2023 13 10 - 44 U/L Final     Anion Gap   Date Value Ref Range Status   08/11/2023 14 8 - 16 mmol/L Final     eGFR if    Date Value Ref Range Status   01/09/2020 >60.0 >60 mL/min/1.73 m^2 Final     eGFR if non    Date Value Ref Range Status   01/09/2020 >60.0 >60 mL/min/1.73 m^2 Final     Comment:     Calculation used to obtain the estimated glomerular filtration  rate (eGFR) is the CKD-EPI equation.        Lab Results   Component Value Date    HEPBSAG Non-reactive 08/11/2023    HEPBSAB Grayzone 08/15/2022    HEPBCAB Non-reactive 08/11/2023           MS Impression and Plan:     NEURO MULTIPLE SCLEROSIS IMPRESSION:   MS Status:     Number of relapses in the past year?:  0    Clinical Progression comment:  She is stable, but has disability related to MS.   Plan:     DMT:  No change in management    DMT comment:  Continue Ocrevus and Vitamin D. Her infusion is due in March. We will check safety labs in February. She is aware of the risks associated with immunosuppressant therapy, including increased risk of infection.       Symptom Management:  Implement change in symptom management    Implement Change in Symptom Management:  Fatigue and Dysphagia (Armodafinil Rx sent to pharmacy; will check on status of Moises PA. We will monitor swallowing for now and consider swallow  study at next visit.)       At this time, she does not feel like she could return to work. 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 She also has balance problems, which would make it harder to walk on ramps, uneven ground.   Due to fatigue, it would be difficult for her to work a 12 hour shift. as she experiences fatigue regularly. She worked a lot on the computer, and she feels like her current attention span and concentration ability are not as good as they used to be. When working, it was taking her longer to get through her daily work because she had to go back and double check. She was not as productive as she had been. She would be required to use her scooter for any long distances while at work, and the work environment is not handicap accessible.    At this time, I support extending Vik's leave from work. We will re-evaluate in 3 months.             She will follow up in clinic in 3 months with me and 6 months with Dr. Maya. I will order MRIs at the next visit to be done in July.   The visit today is associated with current or anticipated ongoing medical care related to this patient's single serious condition/complex condition of multiple sclerosis.        SHANON Slater, CNS

## 2024-01-23 ENCOUNTER — TELEPHONE (OUTPATIENT)
Dept: NEUROLOGY | Facility: CLINIC | Age: 49
End: 2024-01-23
Payer: MEDICAID

## 2024-01-23 NOTE — TELEPHONE ENCOUNTER
----- Message from SHANON Joseph, CNS sent at 1/22/2024  1:10 PM CST -----  Did you receive PA requests for Detrol and Nuvigil? She said she has been unable to pick these up from the pharmacy.

## 2024-01-23 NOTE — TELEPHONE ENCOUNTER
Detrol [facility] (Key: [id number])  PA Case ID #: [id number]  Rx #: [id number]    Nuvigil [facility] (Read: [id number])  PA Case ID #: [id number]  If this claim were submitted today by the pharmacy, it would reject for Early Refill. The dispensing pharmacy can contact our pharmacy help desk at [phone] for assistance with an override, if needed.     Will f/u with pharmacy on 1/25

## 2024-01-25 ENCOUNTER — TELEPHONE (OUTPATIENT)
Dept: PSYCHIATRY | Facility: CLINIC | Age: 49
End: 2024-01-25
Payer: MEDICAID

## 2024-01-25 ENCOUNTER — TELEPHONE (OUTPATIENT)
Dept: NEUROLOGY | Facility: CLINIC | Age: 49
End: 2024-01-25
Payer: MEDICAID

## 2024-01-25 NOTE — TELEPHONE ENCOUNTER
Moises FLOYD approved:  PA has been Approved. PA End Date: 01/22/2025     Spoke to [facility] regarding Detrol PA approval and patient picked up Nuvigil on 1/24

## 2024-01-25 NOTE — TELEPHONE ENCOUNTER
----- Message from SHANON Joseph, CNS sent at 1/22/2024  1:39 PM CST -----  Labs in February (including urine pregnancy)  F/U in clinic in 3 months and BB in 6 months

## 2024-01-25 NOTE — TELEPHONE ENCOUNTER
Madelyn completed form received by [employer] to extend pt's leave through 5/10/24.     Madelyn faxed form to [phone].

## 2024-01-25 NOTE — TELEPHONE ENCOUNTER
Spoke to pt and scheduled labs and urine on 2/5 at 2:00 PM at [facility] lab. I also scheduled pt for an in clinic appt with AP on 5/3 at 10:30 AM. I informed pt she is due for a 6 month follow up appt with Dr. Maya, but BB's July schedule is not open yet. I told her she should receive a letter in the mail in March, and she will be able to schedule with BB once she receives that letter. Pt verbalized understanding.

## 2024-02-02 ENCOUNTER — TELEPHONE (OUTPATIENT)
Dept: NEUROLOGY | Facility: CLINIC | Age: 49
End: 2024-02-02

## 2024-02-05 ENCOUNTER — LAB VISIT (OUTPATIENT)
Dept: LAB | Facility: HOSPITAL | Age: 49
End: 2024-02-05
Attending: CLINICAL NURSE SPECIALIST
Payer: MEDICAID

## 2024-02-05 DIAGNOSIS — G35 MULTIPLE SCLEROSIS: ICD-10-CM

## 2024-02-05 LAB
25(OH)D3+25(OH)D2 SERPL-MCNC: 94 NG/ML (ref 30–96)
ALBUMIN SERPL BCP-MCNC: 3.8 G/DL (ref 3.5–5.2)
ALP SERPL-CCNC: 84 U/L (ref 55–135)
ALT SERPL W/O P-5'-P-CCNC: 13 U/L (ref 10–44)
ANION GAP SERPL CALC-SCNC: 9 MMOL/L (ref 8–16)
AST SERPL-CCNC: 14 U/L (ref 10–40)
BASOPHILS # BLD AUTO: 0.1 K/UL (ref 0–0.2)
BASOPHILS NFR BLD: 0.7 % (ref 0–1.9)
BILIRUB SERPL-MCNC: 0.3 MG/DL (ref 0.1–1)
BUN SERPL-MCNC: 10 MG/DL (ref 6–20)
CALCIUM SERPL-MCNC: 9.7 MG/DL (ref 8.7–10.5)
CHLORIDE SERPL-SCNC: 110 MMOL/L (ref 95–110)
CO2 SERPL-SCNC: 21 MMOL/L (ref 23–29)
CREAT SERPL-MCNC: 0.6 MG/DL (ref 0.5–1.4)
DIFFERENTIAL METHOD BLD: ABNORMAL
EOSINOPHIL # BLD AUTO: 0.1 K/UL (ref 0–0.5)
EOSINOPHIL NFR BLD: 0.6 % (ref 0–8)
ERYTHROCYTE [DISTWIDTH] IN BLOOD BY AUTOMATED COUNT: 17.5 % (ref 11.5–14.5)
EST. GFR  (NO RACE VARIABLE): >60 ML/MIN/1.73 M^2
GLUCOSE SERPL-MCNC: 76 MG/DL (ref 70–110)
HBV CORE AB SERPL QL IA: NORMAL
HBV SURFACE AG SERPL QL IA: NORMAL
HCT VFR BLD AUTO: 37.4 % (ref 37–48.5)
HGB BLD-MCNC: 11.6 G/DL (ref 12–16)
IGA SERPL-MCNC: 95 MG/DL (ref 40–350)
IGG SERPL-MCNC: 1003 MG/DL (ref 650–1600)
IGM SERPL-MCNC: 53 MG/DL (ref 50–300)
IMM GRANULOCYTES # BLD AUTO: 0.07 K/UL (ref 0–0.04)
IMM GRANULOCYTES NFR BLD AUTO: 0.5 % (ref 0–0.5)
LYMPHOCYTES # BLD AUTO: 3.4 K/UL (ref 1–4.8)
LYMPHOCYTES NFR BLD: 23.7 % (ref 18–48)
MCH RBC QN AUTO: 22.4 PG (ref 27–31)
MCHC RBC AUTO-ENTMCNC: 31 G/DL (ref 32–36)
MCV RBC AUTO: 72 FL (ref 82–98)
MONOCYTES # BLD AUTO: 0.8 K/UL (ref 0.3–1)
MONOCYTES NFR BLD: 5.9 % (ref 4–15)
NEUTROPHILS # BLD AUTO: 9.7 K/UL (ref 1.8–7.7)
NEUTROPHILS NFR BLD: 68.6 % (ref 38–73)
NRBC BLD-RTO: 0 /100 WBC
PLATELET # BLD AUTO: 398 K/UL (ref 150–450)
PMV BLD AUTO: 10.4 FL (ref 9.2–12.9)
POTASSIUM SERPL-SCNC: 3.6 MMOL/L (ref 3.5–5.1)
PROT SERPL-MCNC: 6.9 G/DL (ref 6–8.4)
RBC # BLD AUTO: 5.18 M/UL (ref 4–5.4)
SODIUM SERPL-SCNC: 140 MMOL/L (ref 136–145)
WBC # BLD AUTO: 14.11 K/UL (ref 3.9–12.7)

## 2024-02-05 PROCEDURE — 82784 ASSAY IGA/IGD/IGG/IGM EACH: CPT | Mod: 59 | Performed by: CLINICAL NURSE SPECIALIST

## 2024-02-05 PROCEDURE — 36415 COLL VENOUS BLD VENIPUNCTURE: CPT | Mod: PO | Performed by: CLINICAL NURSE SPECIALIST

## 2024-02-05 PROCEDURE — 86704 HEP B CORE ANTIBODY TOTAL: CPT | Performed by: CLINICAL NURSE SPECIALIST

## 2024-02-05 PROCEDURE — 85025 COMPLETE CBC W/AUTO DIFF WBC: CPT | Performed by: CLINICAL NURSE SPECIALIST

## 2024-02-05 PROCEDURE — 87340 HEPATITIS B SURFACE AG IA: CPT | Performed by: CLINICAL NURSE SPECIALIST

## 2024-02-05 PROCEDURE — 80053 COMPREHEN METABOLIC PANEL: CPT | Performed by: CLINICAL NURSE SPECIALIST

## 2024-02-05 PROCEDURE — 82306 VITAMIN D 25 HYDROXY: CPT | Performed by: CLINICAL NURSE SPECIALIST

## 2024-03-26 ENCOUNTER — PATIENT MESSAGE (OUTPATIENT)
Dept: PSYCHIATRY | Facility: CLINIC | Age: 49
End: 2024-03-26
Payer: MEDICAID

## 2024-04-26 NOTE — PROGRESS NOTES
Subjective:          Patient ID: Vik May is a 48 y.o. female who presents today for a routine clinic visit for MS.  She was last seen in January 2024. The history has been provided by the patient.     MS HPI:  DMT: Ocrevus--last infused in March; due next in September   Side effects from DMT? No  Taking vitamin D3 as recommended? Yes - 2000 units daily  She denies any recent infections.   She has not done PT, but has been exercising at home.   She falls 1-2 times a week (maybe less than that). She uses her walker in the house and her walker or scooter for longer distances.   She has started to receive SSDI.   She denies any new symptoms.   She is not sure if dalfampridine is helping.   She has been more fatigued lately.     Medications:  Current Outpatient Medications   Medication Sig Dispense Refill    acetaminophen (TYLENOL) 325 MG tablet Take 650 mg by mouth every 6 (six) hours as needed.      armodafiniL (NUVIGIL) 50 mg tablet Take 1 tablet (50 mg total) by mouth once daily. 90 tablet 1    aspirin-caffeine 845-65 mg PwPk Take by mouth.      baclofen (LIORESAL) 20 MG tablet Take 1 tablet (20 mg total) by mouth every evening. 90 tablet 3    cetirizine (ZYRTEC) 10 MG tablet Take 10 mg by mouth once daily.      cholecalciferol, vitamin D3, (VITAMIN D3) 2,000 unit Cap Take 2 tablets by mouth.      esomeprazole (NEXIUM) 40 MG capsule Take 40 mg by mouth once daily.      ferrous sulfate (FEOSOL) 325 mg (65 mg iron) Tab tablet Take by mouth 2 (two) times daily.      fluticasone propionate (FLONASE) 50 mcg/actuation nasal spray by Each Nostril route.      losartan-hydrochlorothiazide 100-25 mg (HYZAAR) 100-25 mg per tablet Take 1 tablet by mouth once daily.      losartan-hydrochlorothiazide 50-12.5 mg (HYZAAR) 50-12.5 mg per tablet Take 1 tablet by mouth once daily.      OCREVUS 30 mg/mL Soln Inject into the vein.      tolterodine (DETROL LA) 2 MG Cp24 Take 2 capsules (4 mg total) by mouth once daily. 180 capsule 1  "   traZODone (DESYREL) 50 MG tablet Take 1 tablet (50 mg total) by mouth every evening. Take 1/2 to 1 tab hs 90 tablet 1    dalfampridine 10 mg Tb12 Take 1 tablet (10 mg total) by mouth every 12 (twelve) hours. 60 tablet 4     No current facility-administered medications for this visit.       SOCIAL HISTORY  Social History     Tobacco Use    Smoking status: Every Day     Types: Cigarettes    Smokeless tobacco: Never    Tobacco comments:     Pt states she vapes   Substance Use Topics    Alcohol use: Yes    Drug use: Not Currently       Living arrangements - the patient lives with their family.    ROS:      5/3/24   REVIEW OF SYMPTOMS   Do you feel abnormally tired on most days? Yes   Do you feel you generally sleep well? No--she goes to bed early and wakes up early; tosses and turns throughout the night   Do you have difficulty controlling your bladder?  Yes--she feels like her bladder is getting better. She can hold it longer. She thinks she may have had a UTI.    Do you have difficulty controlling your bowels?  Yes--she has been more constipated   Do you have frequent muscle cramps, tightness or spasms in your limbs?  Yes   Do you have new visual symptoms?  Yes--more trouble seeing up close    Do you have worsening difficulty with your memory or thinking? Yes   Do you have worsening symptoms of anxiety or depression?  Yes--"I don't want to be bothered with people." She is open to counseling    For patients who walk, Do you have more difficulty walking?  Yes   Have you fallen since your last visit?  Yes   For patients who use wheelchairs: Do you have any skin wounds or breakdown? Not Applicable   Do you have difficulty using your hands?  No--drops things sometimes    Do you have shooting or burning pain? No   Do you have difficulty with sexual function?  Yes--low libido   If you are sexually active, are you using birth control? Y/N  N/A No   Do you often choke when swallowing liquids or solid food?  Yes--sometimes " "gets "choked up on food." Sometimes food gets stuck [facility] down. She reports that she has acid reflux. She will try something OTC. Discussed swallow study.    Do you experience worsening symptoms when overheated? Yes--cooling vest    Do you need any new equipment such as a wheelchair, walker or shower chair? No   Do you receive co-pay financial assistance for your principal MS medicine? Yes   Would you be interested in participating in an MS research trial in the future? No   For patients on Gilenya, Tecfidera, Aubagio, Rituxan, Ocrevus, Tysabri, Lemtrada or Methotrexate, are you aware that you should NOT receive live virus vaccines?  Yes--discussed with patient    Do you feel you have adequate family/friend support?  Yes   Do you have health insurance?   Yes   Are you currently employed? Yes   Do you receive SSDI/SSI?  No   Do you use marijuana or cannabis products? Yes   How often? Daily   Have you been diagnosed with a urinary tract infection since your last visit here? No   Have you been diagnosed with a respiratory tract infection since your last visit here? No   Have you been to the emergency room since your last visit here? No   Have you been hospitalized since your last visit here?  No                Objective:        1. 25 foot timed walk:       7/21/2023    12:01 AM 5/3/2024    12:01 AM   Timed 25 Foot Walk:   Did patient wear an AFO? No No   Was assistive device used? Yes Yes   Assistive device used (víctor one): Bilateral Assistance Bilateral Assistance   Bilateral device used Walker/Rollator Walker/Rollator   Time for 25 Foot Walk (seconds) 18.2 14.6   Time for 25 Foot Walk (seconds) 15.4 13.8     Neurologic Exam    MENTAL STATUS: intact     CRANIAL NERVE EXAM:  There is no BELIA.  Extraocular muscles are intact.  No facial asymmetry.There is no dysarthria.      MOTOR EXAM: slow RSM bilaterally, worse on right than left;  4/5 IO bilaterally and poor  strength bilaterally;  o/w 5/5 in other UE groups; "   Right HF 3/5, KF 4/5, DF 4+/5; LLE 4+/5 in all flexor groups     REFLEXES: 2+ and symmetric throughout in all four extremities      SENSORY EXAM: normal vibration in the UE and LE      COORDINATION: Normal finger-to-nose exam, RSM in feet bilaterally are difficult, more so on right; heel to shin is difficult bilaterally   Romberg positive     No major changes compared to last visit  Imaging:     Results for orders placed during the hospital encounter of 07/10/23    MRI Brain Demyelinating Without Contrast    Impression  Stable intracranial white matter lesions.  No new lesion identified      Electronically signed by: Zac Harrell  Date:    07/10/2023  Time:    11:19    Results for orders placed during the hospital encounter of 07/10/23    MRI Cervical Spine Demyelinating Without Contrast    Impression  Stable lesions within the cervical spinal cord with no new lesion identified.  Stable mild degenerative changes.      Electronically signed by: Zac Harrell  Date:    07/10/2023  Time:    11:29        Labs:     Lab Results   Component Value Date    ALOENXJR89MM 94 02/05/2024    RMSQDOII69PH 48 08/11/2023    MEPOFDEX94AJ 61 02/10/2023     Lab Results   Component Value Date    JCVINDEX 2.38 (A) 10/16/2019    JCVANTIBODY Positive (A) 10/16/2019     Lab Results   Component Value Date    NI4HCVNK 70.4 01/06/2020    ABSOLUTECD3 1843.0 01/06/2020    EP7UVXNX 15.0 01/06/2020    ABSOLUTECD8 393.0 01/06/2020    TA7QIBQS 55.5 01/06/2020    ABSOLUTECD4 1453.0 (H) 01/06/2020    LABCD48 3.7 (H) 01/06/2020     Lab Results   Component Value Date    WBC 14.11 (H) 02/05/2024    RBC 5.18 02/05/2024    HGB 11.6 (L) 02/05/2024    HCT 37.4 02/05/2024    MCV 72 (L) 02/05/2024    MCH 22.4 (L) 02/05/2024    MCHC 31.0 (L) 02/05/2024    RDW 17.5 (H) 02/05/2024     02/05/2024    MPV 10.4 02/05/2024    GRAN 9.7 (H) 02/05/2024    GRAN 68.6 02/05/2024    LYMPH 3.4 02/05/2024    LYMPH 23.7 02/05/2024    MONO 0.8 02/05/2024    MONO 5.9  02/05/2024    EOS 0.1 02/05/2024    BASO 0.10 02/05/2024    EOSINOPHIL 0.6 02/05/2024    BASOPHIL 0.7 02/05/2024     Sodium   Date Value Ref Range Status   02/05/2024 140 136 - 145 mmol/L Final     Potassium   Date Value Ref Range Status   02/05/2024 3.6 3.5 - 5.1 mmol/L Final     Chloride   Date Value Ref Range Status   02/05/2024 110 95 - 110 mmol/L Final     CO2   Date Value Ref Range Status   02/05/2024 21 (L) 23 - 29 mmol/L Final     Glucose   Date Value Ref Range Status   02/05/2024 76 70 - 110 mg/dL Final     BUN   Date Value Ref Range Status   02/05/2024 10 6 - 20 mg/dL Final     Creatinine   Date Value Ref Range Status   02/05/2024 0.6 0.5 - 1.4 mg/dL Final     Calcium   Date Value Ref Range Status   02/05/2024 9.7 8.7 - 10.5 mg/dL Final     Total Protein   Date Value Ref Range Status   02/05/2024 6.9 6.0 - 8.4 g/dL Final     Albumin   Date Value Ref Range Status   02/05/2024 3.8 3.5 - 5.2 g/dL Final     Total Bilirubin   Date Value Ref Range Status   02/05/2024 0.3 0.1 - 1.0 mg/dL Final     Comment:     For infants and newborns, interpretation of results should be based  on gestational age, weight and in agreement with clinical  observations.    Premature Infant recommended reference ranges:  Up to 24 hours.............<8.0 mg/dL  Up to 48 hours............<12.0 mg/dL  3-5 days..................<15.0 mg/dL  6-29 days.................<15.0 mg/dL       Alkaline Phosphatase   Date Value Ref Range Status   02/05/2024 84 55 - 135 U/L Final     AST   Date Value Ref Range Status   02/05/2024 14 10 - 40 U/L Final     ALT   Date Value Ref Range Status   02/05/2024 13 10 - 44 U/L Final     Anion Gap   Date Value Ref Range Status   02/05/2024 9 8 - 16 mmol/L Final     eGFR if    Date Value Ref Range Status   01/09/2020 >60.0 >60 mL/min/1.73 m^2 Final     eGFR if non    Date Value Ref Range Status   01/09/2020 >60.0 >60 mL/min/1.73 m^2 Final     Comment:     Calculation used to obtain  the estimated glomerular filtration  rate (eGFR) is the CKD-EPI equation.        Lab Results   Component Value Date    HEPBSAG Non-reactive 02/05/2024    HEPBSAB [place] 08/15/2022    HEPBCAB Non-reactive 02/05/2024           MS Impression and Plan:     NEURO MULTIPLE SCLEROSIS IMPRESSION:   Number of relapses in the past year?:  0  Clinical Progression:  Clinically Stable  DMT:  No change in management  DMT comment:  Continue Ocrevus and Vitamin D. Her infusion is due in September. We will check safety labs in August. She is aware of the risks associated with immunosuppressant therapy, including increased risk of infection.     Symptom Management:  Implement change in symptom management  Implement Change in Symptom Management:  Gait and Heat Sensitivity (Referral placed to [facility]. She will take a break from dalfampridine to see if she realizes a benefit when she is on it. Order and application for cooling vest was provided to the patient.)     Discussed Miralax daily for constipation. Discussed magnesium glycinate 400mg at bedtime for sleep.   Will refer to optometry locally and neuro-ophthalmology with Dr. Meyers.   She inquired about a cane, but her walk is much more stable with a walker. We can consider a cane after she does PT if this is appropriate.     MRI brain in July  She will follow up with Dr. Maya in September.   The visit today is associated with current or anticipated ongoing medical care related to this patient's single serious condition/complex condition of multiple sclerosis.    Total time spent with patient: 45 minutes   Total time spent on encounter: 55 minutes         SHANON Slater, CNS    Problem List Items Addressed This Visit    None  Visit Diagnoses       Multiple sclerosis    -  Primary    Relevant Medications    armodafiniL (NUVIGIL) 50 mg tablet    Other Relevant Orders    CBC Auto Differential    Comprehensive Metabolic Panel    Hepatitis B Core Antibody, Total    Hepatitis  B Surface Antigen    Immunoglobulins (IgG, IgA, IgM) Quantitative    Ambulatory referral/consult to Ophthalmology    Ambulatory referral/consult to Optometry    HME - OTHER    MRI Brain Demyelinating Without Contrast    Ambulatory referral/consult to Physical/Occupational Therapy    Fatigue, unspecified type        Relevant Medications    armodafiniL (NUVIGIL) 50 mg tablet    Gait disturbance        Counseling regarding goals of care        Prophylactic immunotherapy        High risk medication use        Muscle spasm

## 2024-05-02 ENCOUNTER — PATIENT MESSAGE (OUTPATIENT)
Dept: PSYCHIATRY | Facility: CLINIC | Age: 49
End: 2024-05-02
Payer: MEDICAID

## 2024-05-03 ENCOUNTER — OFFICE VISIT (OUTPATIENT)
Dept: NEUROLOGY | Facility: CLINIC | Age: 49
End: 2024-05-03
Payer: MEDICAID

## 2024-05-03 VITALS
BODY MASS INDEX: 36.81 KG/M2 | HEIGHT: 64 IN | SYSTOLIC BLOOD PRESSURE: 123 MMHG | DIASTOLIC BLOOD PRESSURE: 83 MMHG | HEART RATE: 74 BPM | WEIGHT: 215.63 LBS

## 2024-05-03 DIAGNOSIS — Z71.89 COUNSELING REGARDING GOALS OF CARE: ICD-10-CM

## 2024-05-03 DIAGNOSIS — Z79.899 HIGH RISK MEDICATION USE: ICD-10-CM

## 2024-05-03 DIAGNOSIS — M62.838 MUSCLE SPASM: ICD-10-CM

## 2024-05-03 DIAGNOSIS — Z29.89 PROPHYLACTIC IMMUNOTHERAPY: ICD-10-CM

## 2024-05-03 DIAGNOSIS — R26.9 GAIT DISTURBANCE: ICD-10-CM

## 2024-05-03 DIAGNOSIS — G35 MULTIPLE SCLEROSIS: Primary | ICD-10-CM

## 2024-05-03 DIAGNOSIS — R53.83 FATIGUE, UNSPECIFIED TYPE: ICD-10-CM

## 2024-05-03 PROCEDURE — 3079F DIAST BP 80-89 MM HG: CPT | Mod: CPTII,,, | Performed by: CLINICAL NURSE SPECIALIST

## 2024-05-03 PROCEDURE — 99215 OFFICE O/P EST HI 40 MIN: CPT | Mod: PBBFAC | Performed by: CLINICAL NURSE SPECIALIST

## 2024-05-03 PROCEDURE — 3074F SYST BP LT 130 MM HG: CPT | Mod: CPTII,,, | Performed by: CLINICAL NURSE SPECIALIST

## 2024-05-03 PROCEDURE — 99215 OFFICE O/P EST HI 40 MIN: CPT | Mod: S$PBB,,, | Performed by: CLINICAL NURSE SPECIALIST

## 2024-05-03 PROCEDURE — 3008F BODY MASS INDEX DOCD: CPT | Mod: CPTII,,, | Performed by: CLINICAL NURSE SPECIALIST

## 2024-05-03 PROCEDURE — 99999 PR PBB SHADOW E&M-EST. PATIENT-LVL V: CPT | Mod: PBBFAC,,, | Performed by: CLINICAL NURSE SPECIALIST

## 2024-05-03 PROCEDURE — G2211 COMPLEX E/M VISIT ADD ON: HCPCS | Mod: S$PBB,,, | Performed by: CLINICAL NURSE SPECIALIST

## 2024-05-03 PROCEDURE — 1159F MED LIST DOCD IN RCRD: CPT | Mod: CPTII,,, | Performed by: CLINICAL NURSE SPECIALIST

## 2024-05-03 RX ORDER — ARMODAFINIL 50 MG/1
50 TABLET ORAL DAILY
Qty: 90 TABLET | Refills: 1 | Status: SHIPPED | OUTPATIENT
Start: 2024-05-03 | End: 2024-05-28 | Stop reason: SDUPTHER

## 2024-05-03 NOTE — PATIENT INSTRUCTIONS
Magnesium glycinate 400mg an hour before bedtime     Miralax 1 capful at bedtime for bowels     [email or web address]

## 2024-05-07 ENCOUNTER — DOCUMENTATION ONLY (OUTPATIENT)
Dept: NEUROLOGY | Facility: CLINIC | Age: 49
End: 2024-05-07
Payer: MEDICAID

## 2024-05-14 NOTE — ADDENDUM NOTE
Addended by: ROCKY ENGEL on: 12/20/2019 07:11 AM     Modules accepted: Orders     For information on Fall & Injury Prevention, visit: [email or web address] OR  [email or web address] OR  https://www.cdc.gov/steadi/patient.html

## 2024-05-15 ENCOUNTER — DOCUMENTATION ONLY (OUTPATIENT)
Dept: NEUROLOGY | Facility: CLINIC | Age: 49
End: 2024-05-15
Payer: MEDICAID

## 2024-05-22 ENCOUNTER — PATIENT MESSAGE (OUTPATIENT)
Dept: NEUROLOGY | Facility: CLINIC | Age: 49
End: 2024-05-22
Payer: MEDICAID

## 2024-05-22 DIAGNOSIS — G35 MULTIPLE SCLEROSIS: ICD-10-CM

## 2024-05-22 DIAGNOSIS — R53.83 FATIGUE, UNSPECIFIED TYPE: ICD-10-CM

## 2024-05-29 RX ORDER — ARMODAFINIL 50 MG/1
50 TABLET ORAL DAILY
Qty: 90 TABLET | Refills: 1 | Status: SHIPPED | OUTPATIENT
Start: 2024-05-29 | End: 2024-11-25

## 2024-07-10 ENCOUNTER — TELEPHONE (OUTPATIENT)
Dept: NEUROLOGY | Facility: CLINIC | Age: 49
End: 2024-07-10
Payer: MEDICAID

## 2024-07-19 ENCOUNTER — HOSPITAL ENCOUNTER (OUTPATIENT)
Dept: RADIOLOGY | Facility: HOSPITAL | Age: 49
Discharge: HOME | End: 2024-07-19
Attending: CLINICAL NURSE SPECIALIST
Payer: MEDICAID

## 2024-07-19 DIAGNOSIS — G35 MULTIPLE SCLEROSIS: ICD-10-CM

## 2024-07-19 PROCEDURE — 70551 MRI BRAIN STEM W/O DYE: CPT | Mod: 26,,, | Performed by: RADIOLOGY

## 2024-07-19 PROCEDURE — 70551 MRI BRAIN STEM W/O DYE: CPT | Mod: TC

## 2024-07-20 ENCOUNTER — PATIENT MESSAGE (OUTPATIENT)
Dept: NEUROLOGY | Facility: CLINIC | Age: 49
End: 2024-07-20
Payer: MEDICAID

## 2024-07-25 ENCOUNTER — PATIENT MESSAGE (OUTPATIENT)
Dept: PSYCHIATRY | Facility: CLINIC | Age: 49
End: 2024-07-25
Payer: MEDICAID

## 2024-08-05 ENCOUNTER — PATIENT MESSAGE (OUTPATIENT)
Dept: PSYCHIATRY | Facility: CLINIC | Age: 49
End: 2024-08-05
Payer: MEDICAID

## 2024-08-06 ENCOUNTER — LAB VISIT (OUTPATIENT)
Dept: LAB | Facility: HOSPITAL | Age: 49
End: 2024-08-06
Attending: CLINICAL NURSE SPECIALIST
Payer: MEDICAID

## 2024-08-06 DIAGNOSIS — G35 MULTIPLE SCLEROSIS: ICD-10-CM

## 2024-08-06 LAB
ALBUMIN SERPL BCP-MCNC: 3.7 G/DL (ref 3.5–5.2)
ALP SERPL-CCNC: 86 U/L (ref 55–135)
ALT SERPL W/O P-5'-P-CCNC: 11 U/L (ref 10–44)
ANION GAP SERPL CALC-SCNC: 8 MMOL/L (ref 8–16)
AST SERPL-CCNC: 15 U/L (ref 10–40)
BASOPHILS # BLD AUTO: 0.09 K/UL (ref 0–0.2)
BASOPHILS NFR BLD: 0.8 % (ref 0–1.9)
BILIRUB SERPL-MCNC: 0.3 MG/DL (ref 0.1–1)
BUN SERPL-MCNC: 11 MG/DL (ref 6–20)
CALCIUM SERPL-MCNC: 9.8 MG/DL (ref 8.7–10.5)
CHLORIDE SERPL-SCNC: 109 MMOL/L (ref 95–110)
CO2 SERPL-SCNC: 24 MMOL/L (ref 23–29)
CREAT SERPL-MCNC: 0.7 MG/DL (ref 0.5–1.4)
DIFFERENTIAL METHOD BLD: ABNORMAL
EOSINOPHIL # BLD AUTO: 0.1 K/UL (ref 0–0.5)
EOSINOPHIL NFR BLD: 1 % (ref 0–8)
ERYTHROCYTE [DISTWIDTH] IN BLOOD BY AUTOMATED COUNT: 17.2 % (ref 11.5–14.5)
EST. GFR  (NO RACE VARIABLE): >60 ML/MIN/1.73 M^2
GLUCOSE SERPL-MCNC: 85 MG/DL (ref 70–110)
HBV CORE AB SERPL QL IA: NORMAL
HBV SURFACE AG SERPL QL IA: NORMAL
HCT VFR BLD AUTO: 37.4 % (ref 37–48.5)
HGB BLD-MCNC: 11.3 G/DL (ref 12–16)
IGA SERPL-MCNC: 108 MG/DL (ref 40–350)
IGG SERPL-MCNC: 1029 MG/DL (ref 650–1600)
IGM SERPL-MCNC: 57 MG/DL (ref 50–300)
IMM GRANULOCYTES # BLD AUTO: 0.02 K/UL (ref 0–0.04)
IMM GRANULOCYTES NFR BLD AUTO: 0.2 % (ref 0–0.5)
LYMPHOCYTES # BLD AUTO: 2 K/UL (ref 1–4.8)
LYMPHOCYTES NFR BLD: 17.6 % (ref 18–48)
MCH RBC QN AUTO: 21.9 PG (ref 27–31)
MCHC RBC AUTO-ENTMCNC: 30.2 G/DL (ref 32–36)
MCV RBC AUTO: 73 FL (ref 82–98)
MONOCYTES # BLD AUTO: 0.7 K/UL (ref 0.3–1)
MONOCYTES NFR BLD: 6.1 % (ref 4–15)
NEUTROPHILS # BLD AUTO: 8.6 K/UL (ref 1.8–7.7)
NEUTROPHILS NFR BLD: 74.3 % (ref 38–73)
NRBC BLD-RTO: 0 /100 WBC
PLATELET # BLD AUTO: 379 K/UL (ref 150–450)
PMV BLD AUTO: 10.4 FL (ref 9.2–12.9)
POTASSIUM SERPL-SCNC: 3.7 MMOL/L (ref 3.5–5.1)
PROT SERPL-MCNC: 7 G/DL (ref 6–8.4)
RBC # BLD AUTO: 5.15 M/UL (ref 4–5.4)
SODIUM SERPL-SCNC: 141 MMOL/L (ref 136–145)
WBC # BLD AUTO: 11.56 K/UL (ref 3.9–12.7)

## 2024-08-06 PROCEDURE — 36415 COLL VENOUS BLD VENIPUNCTURE: CPT | Mod: PO | Performed by: CLINICAL NURSE SPECIALIST

## 2024-08-06 PROCEDURE — 85025 COMPLETE CBC W/AUTO DIFF WBC: CPT | Performed by: CLINICAL NURSE SPECIALIST

## 2024-08-06 PROCEDURE — 80053 COMPREHEN METABOLIC PANEL: CPT | Performed by: CLINICAL NURSE SPECIALIST

## 2024-08-06 PROCEDURE — 86704 HEP B CORE ANTIBODY TOTAL: CPT | Performed by: CLINICAL NURSE SPECIALIST

## 2024-08-06 PROCEDURE — 82784 ASSAY IGA/IGD/IGG/IGM EACH: CPT | Mod: 59 | Performed by: CLINICAL NURSE SPECIALIST

## 2024-08-06 PROCEDURE — 87340 HEPATITIS B SURFACE AG IA: CPT | Performed by: CLINICAL NURSE SPECIALIST

## 2024-08-08 ENCOUNTER — TELEPHONE (OUTPATIENT)
Dept: NEUROLOGY | Facility: CLINIC | Age: 49
End: 2024-08-08
Payer: MEDICAID

## 2024-10-19 DIAGNOSIS — G47.00 INSOMNIA, UNSPECIFIED TYPE: ICD-10-CM

## 2024-10-21 RX ORDER — TRAZODONE HYDROCHLORIDE 50 MG/1
50 TABLET ORAL NIGHTLY
Qty: 90 TABLET | Refills: 1 | Status: SHIPPED | OUTPATIENT
Start: 2024-10-21

## 2024-12-16 ENCOUNTER — PATIENT MESSAGE (OUTPATIENT)
Dept: PSYCHIATRY | Facility: CLINIC | Age: 49
End: 2024-12-16
Payer: MEDICAID

## 2024-12-30 DIAGNOSIS — G35 MULTIPLE SCLEROSIS: ICD-10-CM

## 2024-12-30 DIAGNOSIS — N31.9 NEUROGENIC BLADDER: ICD-10-CM

## 2024-12-30 DIAGNOSIS — M62.838 MUSCLE SPASM: ICD-10-CM

## 2024-12-31 RX ORDER — BACLOFEN 20 MG/1
20 TABLET ORAL NIGHTLY
Qty: 90 TABLET | Refills: 3 | Status: SHIPPED | OUTPATIENT
Start: 2024-12-31

## 2024-12-31 RX ORDER — TOLTERODINE 2 MG/1
4 CAPSULE, EXTENDED RELEASE ORAL DAILY
Qty: 180 CAPSULE | Refills: 1 | Status: SHIPPED | OUTPATIENT
Start: 2024-12-31

## 2025-02-26 ENCOUNTER — TELEPHONE (OUTPATIENT)
Dept: NEUROLOGY | Facility: CLINIC | Age: 50
End: 2025-02-26
Payer: MEDICAID

## 2025-02-26 DIAGNOSIS — G35 MULTIPLE SCLEROSIS: Primary | ICD-10-CM

## 2025-03-07 ENCOUNTER — PATIENT MESSAGE (OUTPATIENT)
Dept: PSYCHIATRY | Facility: CLINIC | Age: 50
End: 2025-03-07
Payer: MEDICAID

## 2025-03-07 ENCOUNTER — LAB VISIT (OUTPATIENT)
Dept: LAB | Facility: HOSPITAL | Age: 50
End: 2025-03-07
Attending: CLINICAL NURSE SPECIALIST
Payer: MEDICAID

## 2025-03-07 DIAGNOSIS — G35 MULTIPLE SCLEROSIS: ICD-10-CM

## 2025-03-07 PROCEDURE — 36415 COLL VENOUS BLD VENIPUNCTURE: CPT | Mod: PO | Performed by: CLINICAL NURSE SPECIALIST

## 2025-03-07 PROCEDURE — 87340 HEPATITIS B SURFACE AG IA: CPT | Performed by: CLINICAL NURSE SPECIALIST

## 2025-03-07 PROCEDURE — 86704 HEP B CORE ANTIBODY TOTAL: CPT | Performed by: CLINICAL NURSE SPECIALIST

## 2025-03-07 PROCEDURE — 85025 COMPLETE CBC W/AUTO DIFF WBC: CPT | Performed by: CLINICAL NURSE SPECIALIST

## 2025-03-07 PROCEDURE — 80053 COMPREHEN METABOLIC PANEL: CPT | Performed by: CLINICAL NURSE SPECIALIST

## 2025-03-07 PROCEDURE — 82784 ASSAY IGA/IGD/IGG/IGM EACH: CPT | Mod: 59 | Performed by: CLINICAL NURSE SPECIALIST

## 2025-03-08 LAB
ALBUMIN SERPL BCP-MCNC: 3.8 G/DL (ref 3.5–5.2)
ALP SERPL-CCNC: 86 U/L (ref 40–150)
ALT SERPL W/O P-5'-P-CCNC: 13 U/L (ref 10–44)
ANION GAP SERPL CALC-SCNC: 13 MMOL/L (ref 8–16)
AST SERPL-CCNC: 44 U/L (ref 10–40)
BASOPHILS # BLD AUTO: 0.1 K/UL (ref 0–0.2)
BASOPHILS NFR BLD: 0.7 % (ref 0–1.9)
BILIRUB SERPL-MCNC: 0.3 MG/DL (ref 0.1–1)
BUN SERPL-MCNC: 11 MG/DL (ref 6–20)
CALCIUM SERPL-MCNC: 9.2 MG/DL (ref 8.7–10.5)
CHLORIDE SERPL-SCNC: 107 MMOL/L (ref 95–110)
CO2 SERPL-SCNC: 20 MMOL/L (ref 23–29)
CREAT SERPL-MCNC: 0.6 MG/DL (ref 0.5–1.4)
DIFFERENTIAL METHOD BLD: ABNORMAL
EOSINOPHIL # BLD AUTO: 0.2 K/UL (ref 0–0.5)
EOSINOPHIL NFR BLD: 1 % (ref 0–8)
ERYTHROCYTE [DISTWIDTH] IN BLOOD BY AUTOMATED COUNT: 18.6 % (ref 11.5–14.5)
EST. GFR  (NO RACE VARIABLE): >60 ML/MIN/1.73 M^2
GLUCOSE SERPL-MCNC: 92 MG/DL (ref 70–110)
HBV CORE AB SERPL QL IA: NORMAL
HBV SURFACE AG SERPL QL IA: NORMAL
HCT VFR BLD AUTO: 38.7 % (ref 37–48.5)
HGB BLD-MCNC: 12 G/DL (ref 12–16)
IGA SERPL-MCNC: 99 MG/DL (ref 40–350)
IGG SERPL-MCNC: 1018 MG/DL (ref 650–1600)
IGM SERPL-MCNC: 56 MG/DL (ref 50–300)
IMM GRANULOCYTES # BLD AUTO: 0.04 K/UL (ref 0–0.04)
IMM GRANULOCYTES NFR BLD AUTO: 0.3 % (ref 0–0.5)
LYMPHOCYTES # BLD AUTO: 3.3 K/UL (ref 1–4.8)
LYMPHOCYTES NFR BLD: 22.7 % (ref 18–48)
MCH RBC QN AUTO: 22.2 PG (ref 27–31)
MCHC RBC AUTO-ENTMCNC: 31 G/DL (ref 32–36)
MCV RBC AUTO: 72 FL (ref 82–98)
MONOCYTES # BLD AUTO: 0.9 K/UL (ref 0.3–1)
MONOCYTES NFR BLD: 6 % (ref 4–15)
NEUTROPHILS # BLD AUTO: 10.1 K/UL (ref 1.8–7.7)
NEUTROPHILS NFR BLD: 69.3 % (ref 38–73)
NRBC BLD-RTO: 0 /100 WBC
PLATELET # BLD AUTO: 397 K/UL (ref 150–450)
PMV BLD AUTO: 10.5 FL (ref 9.2–12.9)
POTASSIUM SERPL-SCNC: 3.8 MMOL/L (ref 3.5–5.1)
PROT SERPL-MCNC: 7.4 G/DL (ref 6–8.4)
RBC # BLD AUTO: 5.4 M/UL (ref 4–5.4)
SODIUM SERPL-SCNC: 140 MMOL/L (ref 136–145)
WBC # BLD AUTO: 14.6 K/UL (ref 3.9–12.7)

## 2025-03-12 ENCOUNTER — OFFICE VISIT (OUTPATIENT)
Dept: NEUROLOGY | Facility: CLINIC | Age: 50
End: 2025-03-12
Payer: MEDICAID

## 2025-03-12 VITALS
DIASTOLIC BLOOD PRESSURE: 87 MMHG | HEIGHT: 64 IN | BODY MASS INDEX: 37.95 KG/M2 | WEIGHT: 222.31 LBS | SYSTOLIC BLOOD PRESSURE: 143 MMHG | HEART RATE: 75 BPM

## 2025-03-12 DIAGNOSIS — G35 MULTIPLE SCLEROSIS: Primary | ICD-10-CM

## 2025-03-12 DIAGNOSIS — R53.83 FATIGUE, UNSPECIFIED TYPE: ICD-10-CM

## 2025-03-12 DIAGNOSIS — R79.89 ELEVATED LFTS: ICD-10-CM

## 2025-03-12 DIAGNOSIS — M54.16 LUMBAR RADICULOPATHY: ICD-10-CM

## 2025-03-12 DIAGNOSIS — Z79.899 HIGH RISK MEDICATION USE: ICD-10-CM

## 2025-03-12 DIAGNOSIS — R26.9 GAIT DISTURBANCE: ICD-10-CM

## 2025-03-12 DIAGNOSIS — Z71.89 COUNSELING REGARDING GOALS OF CARE: ICD-10-CM

## 2025-03-12 DIAGNOSIS — Z29.89 PROPHYLACTIC IMMUNOTHERAPY: ICD-10-CM

## 2025-03-12 PROCEDURE — 99215 OFFICE O/P EST HI 40 MIN: CPT | Mod: PBBFAC | Performed by: CLINICAL NURSE SPECIALIST

## 2025-03-12 PROCEDURE — 3008F BODY MASS INDEX DOCD: CPT | Mod: CPTII,,, | Performed by: CLINICAL NURSE SPECIALIST

## 2025-03-12 PROCEDURE — 3079F DIAST BP 80-89 MM HG: CPT | Mod: CPTII,,, | Performed by: CLINICAL NURSE SPECIALIST

## 2025-03-12 PROCEDURE — 99999 PR PBB SHADOW E&M-EST. PATIENT-LVL V: CPT | Mod: PBBFAC,,, | Performed by: CLINICAL NURSE SPECIALIST

## 2025-03-12 PROCEDURE — 3077F SYST BP >= 140 MM HG: CPT | Mod: CPTII,,, | Performed by: CLINICAL NURSE SPECIALIST

## 2025-03-12 PROCEDURE — 99215 OFFICE O/P EST HI 40 MIN: CPT | Mod: S$PBB,,, | Performed by: CLINICAL NURSE SPECIALIST

## 2025-03-12 PROCEDURE — G2211 COMPLEX E/M VISIT ADD ON: HCPCS | Mod: S$PBB,,, | Performed by: CLINICAL NURSE SPECIALIST

## 2025-03-12 RX ORDER — ARMODAFINIL 50 MG/1
50 TABLET ORAL DAILY
Qty: 30 TABLET | Refills: 0 | Status: CANCELLED | OUTPATIENT
Start: 2025-03-12 | End: 2025-04-11

## 2025-03-12 RX ORDER — ARMODAFINIL 50 MG/1
50 TABLET ORAL DAILY
Qty: 30 TABLET | Refills: 0 | Status: SHIPPED | OUTPATIENT
Start: 2025-03-12 | End: 2025-04-11

## 2025-03-12 NOTE — PROGRESS NOTES
"Subjective:       Patient ID: Vik May is a 49 y.o. female who presents today for a routine clinic visit for MS and a power mobility evaluation.  She was last seen in May 2024. The history has been provided by the patient. She is accompanied by her son.       MS HPI:  History of Present Illness    CHIEF COMPLAINT:  Patient presents today for follow up of multiple sclerosis and Ocrevus infusion.    MULTIPLE SCLEROSIS SYMPTOMS:  She reports right leg weakness with dragging during ambulation, resulting in 2 recent falls without significant injury. She experiences lower back pain when standing for prolonged periods, described as a pressing sensation to the mid-back area that radiates down the right side. The pain improves with position changes but returns with extended standing. She would like to get a scooter to use around the house.     NEUROLOGICAL SYMPTOMS:  She reports memory and cognitive difficulties, including trouble remembering codes and occasionally forgetting to pay bills. She experiences challenges with reading comprehension, often needing to revisit text for better understanding despite word recognition. She has difficulty with vision, noting inconsistent visual acuity with her current glasses that varies daily.    SLEEP AND FATIGUE:  She reports poor sleep quality and does not maintain sleep throughout the night despite taking trazodone. She takes an OTC magnesium supplement to aid with sleep. She experiences poor energy levels and takes short naps during the day.    MOOD:  She experiences episodes of depression and anxiety without clear triggers.    UROLOGIC HISTORY:  She reports a recent history of 2-3 urinary tract infections, which she self-treated with cranberry products rather than antibiotics. She denies any recent infections.       DMT: Ocrevus--due this month   Side effects from DMT? No  Taking vitamin D3 as recommended? 2000 units   She is exercising a little, "but not like I should." She " would like to restart physical therapy and focus on walking/balance.   She inquires about restarting Nuvigil. She reports that it made her feel calm and energized.     SOCIAL HISTORY  Social History[1]  Living arrangements - the patient lives with her family   Employment: SSDI     ROS:       3/12/25   REVIEW OF SYMPTOMS   Do you feel abnormally tired on most days? Yes--energy level is not good; tries to nap   Do you feel you generally sleep well? No--she takes trazodone, which helps her fall asleep but not stay asleep. She is taking magnesium    Do you have difficulty controlling your bladder?  Yes--some UTIs; takes tolterodine    Do you have difficulty controlling your bowels?  Yes--she has been more constipated; takes iron pills   Do you have frequent muscle cramps, tightness or spasms in your limbs?  Yes--sometimes has spasms; still takes baclofen    Do you have new visual symptoms?  Yes--she has intermittent changes in vision; needs to see eye doctor    Do you have worsening difficulty with your memory or thinking? Yes--some short-term memory challenges; some trouble remembering numbers; she feels ok cognitively when not under pressure; she forgets to pay bills sometimes. She sometimes doesn't understand what she is reading.    Do you have worsening symptoms of anxiety or depression?  Yes--She feels a mix of anxiety/depression. She has done counseling before, but does not want to pursue this right now; has not been wanting to do things lately    For patients who walk, Do you have more difficulty walking?  Yes--she feels like her walking is getting worse; she slows down as she gets closer to her infusion    Have you fallen since your last visit?  Yes   For patients who use wheelchairs: Do you have any skin wounds or breakdown? Not Applicable   Do you have difficulty using your hands?  No--drops things every now and then    Do you have shooting or burning pain? No   Do you have difficulty with sexual function?   Not assessed    If you are sexually active, are you using birth control? Y/N  N/A No   Do you often choke when swallowing liquids or solid food?  Yes--every now and then; coughs sometimes in her sleep    Do you experience worsening symptoms when overheated? N/A   Do you need any new equipment such as a wheelchair, walker or shower chair? Wants a scooter    Do you receive co-pay financial assistance for your principal MS medicine? Yes   Would you be interested in participating in an MS research trial in the future? No   For patients on Gilenya, Tecfidera, Aubagio, Rituxan, Ocrevus, Tysabri, Lemtrada or Methotrexate, are you aware that you should NOT receive live virus vaccines?  Yes--discussed with patient    Do you feel you have adequate family/friend support?  Yes   Do you have health insurance?   Yes   Are you currently employed? No    Do you receive SSDI/SSI?  Yes   Do you use marijuana or cannabis products? Not assessed    How often?    Have you been diagnosed with a urinary tract infection since your last visit here? No   Have you been diagnosed with a respiratory tract infection since your last visit here? No   Have you been to the emergency room since your last visit here? No   Have you been hospitalized since your last visit here?  No             Objective:        1. 25 foot timed walk:      5/3/2024    10:30 AM 3/12/2025    11:30 AM   Timed 25 Foot Walk:   Did patient wear an AFO? No No   Was assistive device used? Yes Yes   Assistive device used (víctor one): Bilateral Assistance Bilateral Assistance   Bilateral device used Walker/Rollator Walker/Rollator   Time for 25 Foot Walk (seconds) 14.6 12.5   Time for 25 Foot Walk (seconds) 13.8 12.6     Neurological Exam      MENTAL STATUS: intact     CRANIAL NERVE EXAM:  There is no BELIA.  Extraocular muscles are intact.  No facial asymmetry.There is no dysarthria.      MOTOR EXAM: slow RSM bilaterally, worse on right than left;  Reduced  on left;  o/w 5/5 in  other UE groups;   Right HF 3/5, KF 4+/5, DF 4/5; LLE 5-/5 in all flexor groups     REFLEXES: 2+ and symmetric throughout in all four extremities      SENSORY EXAM: normal vibration in the UE and LE      COORDINATION: Slow finger-to-nose exam, RSM in feet bilaterally are difficult, more so on right; heel to shin is difficult on the right, normal on left    Romberg positive     No major changes compared to last visit    Imaging:     Results for orders placed during the hospital encounter of 07/19/24    MRI Brain Demyelinating Without Contrast    Impression  Small lesion in the central vonda now appear slightly larger and more confluent.  While this may be artifactual, recommend correlation for possible new/worsening demyelinating lesion.    Remainder of the brain otherwise appears stable from prior exam, again demonstrating findings compatible with the reported history of multiple sclerosis.    Electronically signed by resident: Teo Saha  Date:    07/19/2024  Time:    08:42    Electronically signed by: Jonathan Luciano MD  Date:    07/19/2024  Time:    09:30        Labs:     Lab Results   Component Value Date    HLZZSZTR28RA 94 02/05/2024    EUARYCYY86OM 48 08/11/2023    NPDSWCQL13QQ 61 02/10/2023       Lab Results   Component Value Date    AK6LMSYX 70.4 01/06/2020    ABSOLUTECD3 1843.0 01/06/2020    YO6WLUKI 15.0 01/06/2020    ABSOLUTECD8 393.0 01/06/2020    VN7JTAJJ 55.5 01/06/2020    ABSOLUTECD4 1453.0 (H) 01/06/2020    LABCD48 3.7 (H) 01/06/2020     Lab Results   Component Value Date    WBC 14.60 (H) 03/07/2025    RBC 5.40 03/07/2025    HGB 12.0 03/07/2025    HCT 38.7 03/07/2025    MCV 72 (L) 03/07/2025    MCH 22.2 (L) 03/07/2025    MCHC 31.0 (L) 03/07/2025    RDW 18.6 (H) 03/07/2025     03/07/2025    MPV 10.5 03/07/2025    GRAN 10.1 (H) 03/07/2025    GRAN 69.3 03/07/2025    LYMPH 3.3 03/07/2025    LYMPH 22.7 03/07/2025    MONO 0.9 03/07/2025    MONO 6.0 03/07/2025    EOS 0.2 03/07/2025    BASO 0.10  03/07/2025    EOSINOPHIL 1.0 03/07/2025    BASOPHIL 0.7 03/07/2025     Sodium   Date Value Ref Range Status   03/07/2025 140 136 - 145 mmol/L Final     Potassium   Date Value Ref Range Status   03/07/2025 3.8 3.5 - 5.1 mmol/L Final     Chloride   Date Value Ref Range Status   03/07/2025 107 95 - 110 mmol/L Final     CO2   Date Value Ref Range Status   03/07/2025 20 (L) 23 - 29 mmol/L Final     Glucose   Date Value Ref Range Status   03/07/2025 92 70 - 110 mg/dL Final     BUN   Date Value Ref Range Status   03/07/2025 11 6 - 20 mg/dL Final     Creatinine   Date Value Ref Range Status   03/07/2025 0.6 0.5 - 1.4 mg/dL Final     Calcium   Date Value Ref Range Status   03/07/2025 9.2 8.7 - 10.5 mg/dL Final     Total Protein   Date Value Ref Range Status   03/07/2025 7.4 6.0 - 8.4 g/dL Final     Albumin   Date Value Ref Range Status   03/07/2025 3.8 3.5 - 5.2 g/dL Final     Total Bilirubin   Date Value Ref Range Status   03/07/2025 0.3 0.1 - 1.0 mg/dL Final     Comment:     For infants and newborns, interpretation of results should be based  on gestational age, weight and in agreement with clinical  observations.    Premature Infant recommended reference ranges:  Up to 24 hours.............<8.0 mg/dL  Up to 48 hours............<12.0 mg/dL  3-5 days..................<15.0 mg/dL  6-29 days.................<15.0 mg/dL       Alkaline Phosphatase   Date Value Ref Range Status   03/07/2025 86 40 - 150 U/L Final     AST   Date Value Ref Range Status   03/07/2025 44 (H) 10 - 40 U/L Final     ALT   Date Value Ref Range Status   03/07/2025 13 10 - 44 U/L Final     Anion Gap   Date Value Ref Range Status   03/07/2025 13 8 - 16 mmol/L Final     eGFR if    Date Value Ref Range Status   01/09/2020 >60.0 >60 mL/min/1.73 m^2 Final     eGFR if non    Date Value Ref Range Status   01/09/2020 >60.0 >60 mL/min/1.73 m^2 Final     Comment:     Calculation used to obtain the estimated glomerular filtration  rate  (eGFR) is the CKD-EPI equation.          Diagnosis/Assessment/Plan:       Assessment & Plan    IMPRESSION:  - Evaluated MS symptoms and disease progression  - Reviewed lab results, noting stable white blood cell elevation consistent with previous hematology assessment  - Assessed walking ability, noting slight improvement in timed walk test  - Evaluated cognitive function and recommended baseline neuropsychological testing  - Considered antidepressant but opted to trial armodafinil first for mood improvement, as she feels that better energy improved her mood in the past  - Assessed vision changes and recommended ophthalmology evaluation  - Discussed potential causes of lower back pain, including possible sciatica    MULTIPLE SCLEROSIS:  - Explained MS symptom variability and potential affected body areas.  - Discussed the importance of Ocrevus in stabilizing MS progression.  - Continued Ocrevus infusions as scheduled. Safety labs have been reviewed.   - Updated brain MRI ordered.  - Cognitive testing (neuropsychological evaluation) ordered.    RADICULOPATHY (SCIATICA):  - Discussed sciatica symptoms and potential causes.  - Lumbar spine MRI ordered.    GAIT ABNORMALITIES AND DROP FOOT:  - Provided information on Bioness and Cionic sleeve devices for drop foot management.    VISUAL DISTURBANCES:  - Referred to ophthalmology for vision changes.    INSOMNIA:  - Explained potential benefits of magnesium glycinate for sleep improvement.  - Started magnesium glycinate 400mg to be taken 1 hour before bedtime.  - Continued trazodone for sleep.    FATIGUE:  - Started armodafinil 50 mg daily for energy and mood improvement.  - Patient to spend 10-15 minutes outside daily for sun exposure and fresh air.    MUSCLE SPASMS:  - Continued baclofen for muscle spasms.    NEUROGENIC BLADDER:  - Continued Tolterodine for bladder control.    GENERAL HEALTH MONITORING:  - Liver function test ordered for 1 month from now  (fasting).    PHYSICAL THERAPY:  - Referred to physical therapy at [facility], focusing on walking, balance, right leg strengthening and stretching, and lower back pain/sciatica management.    URINARY TRACT INFECTION PREVENTION:  - Contact the office if experiencing UTI symptoms for urine sample order and potential treatment.    The patient also requested a mobility evaluation for a scooter.  I am recommending the patient receive a scooter per the evaluation and recommendation of PT/OT.  The patient has previously used a Mobility device: walker/rollator but is unable to safely complete activities of daily living within the home using this device.  The patient is unable to use a walker for functional distances due to fatigue and lower extremity weakness due to MS, as well as chronic low back pain with  radiculopathy. A scooter is necessary for mobility in the home to complete activities of daily living.  Although the patient has reasonable upper body strength, she does have some coordination challenges, as well as fatigue, which would limit her ability to use a manual wheelchair.  She does have the ability to safely transfer to and from the scooter, operate the tiller steering system, and maintain postural stability and position while operating the scooter in the home.  The patient is willing to use the scooter in their home and has the mental and physical capabilities to safely use the scooter in their home.     FOLLOW-UP:  - Follow up in 4 months with Dr. Maya  The visit today is associated with current or anticipated ongoing medical care related to this patient's single serious condition/complex condition of multiple sclerosis.            Total time spent with patient: 56 minutes   Total time spent on encounter: 70 minutes       TAMARA Duke-BC, MSCN    Problem List Items Addressed This Visit    None  Visit Diagnoses         Multiple sclerosis    -  Primary    Relevant Orders    MRI Brain Demyelinating  Without Contrast    MRI Lumbar Spine Without Contrast    Ambulatory referral/consult to Physical/Occupational Therapy    Ambulatory referral/consult to Neuropsychology      Elevated LFTs        Relevant Orders    Hepatic function panel      Fatigue, unspecified type        Relevant Medications    armodafiniL (NUVIGIL) 50 mg tablet      Lumbar radiculopathy        Relevant Orders    MRI Lumbar Spine Without Contrast      Gait disturbance          Counseling regarding goals of care          Prophylactic immunotherapy          High risk medication use                  This note was generated with the assistance of ambient listening technology. Verbal consent was obtained by the patient and accompanying visitor(s) for the recording of patient appointment to facilitate this note. I attest to having reviewed and edited the generated note for accuracy, though some syntax or spelling errors may persist. Please contact the author of this note for any clarification.           [1]   Social History  Tobacco Use    Smoking status: Every Day     Types: Cigarettes    Smokeless tobacco: Never    Tobacco comments:     Pt states she vapes   Substance Use Topics    Alcohol use: Yes    Drug use: Not Currently

## 2025-03-12 NOTE — Clinical Note
I'd like to order a scooter for this patient. I don't have the paper in front of me with the specific orders to put in, but I can do these next week for PT and the scooter. Please check my documentation :)

## 2025-03-12 NOTE — TELEPHONE ENCOUNTER
----- Message from Becca sent at 3/12/2025  3:13 PM CDT -----  Regarding: Pt called states she was adv that a Rx wld be sent to [facility] on her behalf states waiting at the Pharmacy  Contact: [phone]  Name of Who is Calling:ANILA DEL CASTILLO [[id number]]  What is the request in detail:Pt called states she was adv that a Rx wld be sent to [facility] on her behalf states waiting at the Pharmacy. Please advise   Can the clinic reply by [facility]:No  What Number to [facility] Back [facility]: Telephone Information:Mobile          [phone]

## 2025-03-15 ENCOUNTER — RESULTS FOLLOW-UP (OUTPATIENT)
Dept: NEUROLOGY | Facility: CLINIC | Age: 50
End: 2025-03-15

## 2025-03-17 ENCOUNTER — TELEPHONE (OUTPATIENT)
Dept: NEUROLOGY | Facility: CLINIC | Age: 50
End: 2025-03-17
Payer: MEDICAID

## 2025-03-17 ENCOUNTER — DOCUMENTATION ONLY (OUTPATIENT)
Dept: NEUROLOGY | Facility: CLINIC | Age: 50
End: 2025-03-17
Payer: MEDICAID

## 2025-03-18 NOTE — TELEPHONE ENCOUNTER
[facility] REHABILITATION PROGRAM  POST ADMISSION PHYSICIAN EVALUATION (MYESHA) - HISTORY AND PHYSICAL          Referring Physician: No ref. provider found  Primary Care Physician: Nargis Rosario MD    ASSESSMENT:    The etiologic diagnosis and co-morbidities listed below require acute inpatient rehabilitation, with the 24-hour availability and frequent intervention of a rehabilitation physician.       Impairment group code: 4.111       Etiologic diagnosis:  NTSCI  Severe lumber spinal stenosis at L4-L5  Moderate to severe bilateral neuroforaminal narrowing at C3-4 and C4-5  Bilateral extremities weakness.  Dysarthria  Metastatic disease to brain, s/p radiation  Stage IV renal cell carcinoma, s/p left partial nephrectomy with known brain metastasis, s/p radiation  Impaired gait and mobility  Debility  Hypertension  Hyponatremia  VERONIKA  Depression  GERD  Fall risk    PLAN:    The patient will be admitted to the [address] [place] [address] rehabilitation unit to begin comprehensive acute inpatient rehabilitation.      The patient requires a physician specializing in Rehabilitation to provide daily oversight and close medical supervision including: Rehabilitation leadership, coordination of treatment team, medical and co-morbidity management, pain management, and bowel and bladder management.    The patient requires 24-hour nursing for pain issues, bowel and bladder care, skin integrity wound care, trach management, medication management, safety measures, patient/family education, carry-over of therapeutic strategies, family/caregiver training, and preventing risk factors/complications.      The patient requires an intense level of services by specially-trained therapists with an expected frequency and duration of treatment of at least 3 hours/day, 5-7 days/week, with a coordinated, interdisciplinary team approach, consisting of:   - Occupational therapy to improve feeding, ADLs/self care skills, toileting, toilet  Spoke to pt and scheduled her labs on 2/10 at 12:20 PM at [facility]. I also sent a message to Dr. Noriega's pool to get pt scheduled for mobility evaluation. I let pt know this and she verbalized understanding.    transfers, splinting, adaptive equipment, household tasks, higher functioning activities, strengthening, stretching, endurance, range of motion, DME assessment, and patient/caregiver training  - Physical therapy to improve bed mobility, transfer skills, wheelchair skills, balance training, gait training, neuromuscular re-education, range of motion/joint mobilization, strengthening, stretching, endurance, stair training, DME assessment, and patient/caregiver training  - Speech therapy to improve cognition, communication skills, swallowing, and compensatory strategies  - Recreational therapy to integrate skills obtained in other therapies and for community reintegration    The patient will also require:  -  for discharge planning and family interventions   - Weekly team conferences to coordinate plan of care   - Dietitian and Nutrition services for adequate nutrition and education  - Neuropsychology for adjustment and supportive counseling, and for cognitive assessment    Potential goals by discharge / expected level of improvement: Self care and mobility at  Supervision to Mod I  Patient’s prognosis for significant practical improvement: fair  Expected length of time needed to achieve that level of improvement: 7 to 10 days  Anticipated discharge disposition: Home  Anticipated post-discharge treatments: [facility] vs outpatient therapy    Safe to initiate therapy program: Yes    Risk for clinical complications: The patient is at increased risk for respiratory failure, aspiration pneumonia, falls, fatigue, infection, alteration in skin integrity, pain, DVT, PE, dehydration, electrolyte imbalance, poor nutrition, altered sleep pattern, alteration in bowel/bladder control, and cardiopulmonary events. The patient's cardiopulmonary response to exercise and activity will be monitored by nursing and therapy staff with regular checks of blood pressure, pulse rate, and oxygen saturations.  Given the above listed  co-morbidities and risks for complications, the patient would benefit from 24-hour availability of the rehabilitation team for closer medical monitoring and coordination of care with other specialists.  To minimize these risks for complications, the patient will receive close medical monitoring provided by the physiatrist who will also coordinate care with multiple disciplines.     REHAB: Patient beginning full intensive rehab program.  No medical issues precluding full involvement.     ID: Will monitor for signs/symptoms of infection     CARD: Will monitor cardiac status with increase activity     NEURO: Will monitor neuro status     PAIN: Continue pain meds. Will monitor     PULM: Will monitor respiratory status with increase activity     HEME: intermittent monitoring     GI:  Will place on bowel program.     :  Will place on bladder program.     SKIN: Nursing to monitor for any issues with skin integrity.     VASC: Continue Heparin subQ at prophylactic dosage.     ELECTROLYTES:  Will monitor and make adjustments as needed.      NUTRITION: Will monitor diet     DISPOSITION: Tentative discharge date TBD  ______________________________________________________________    Chief Complaint: Patient requires admission to acute inpatient rehabilitation.    History of Present Illness:   Janelle Shrestha is a 76 year old right-handed female with past medical history of HTN, GERD, brain metastasis, stage IV renal cell carcinoma s/p left partial nephrectomy with known brain mets s/p radiation, TIA, GERD who presented to the ER with slurred speech and weakness. At the ER, the patient normotensive with some episodes of bradycardia. CT of the head with no acute intracranial hemorrhage ; small brain metastasis , postradiation treatment. CTA of the head and neck with no large arterial occlusion or significant stenosis identified in the head and neck. No ischemic penumbra or other significant perfusion abnormality. MRI of  the brain (3/5/25) had shown interval evolution of ill-defined enhancement in the left lateral precentral gyrus with new subtle enhancement in the postcentral gyrus representing evolving metastatic lesion. Neurology following. Patient with functional deficit. Physical medicine and rehabilitation evaluated patient for rehab need, and acute inpatient rehab recommended. Admitted to [facility] on 3/17       Past Medical History  Past Medical History:   Diagnosis Date    Anemia     Anesthesia     NO REACTION    Anxiety     Cerebral infarction (CMD)     COVID-19     + 08.12.2022 / TREATED AT HOME    Depression     Essential (primary) hypertension     Gastroesophageal reflux disease     Hiatal hernia     History of blood clots 04/2022    \"POSSIBLE\" BLOOD CLOT HEART   FOUND VIA CAMILA THAT WAS BEING DONE IMMEDIATELY BEFORE OPEN HEART SURGERY FOR PFO - SO SURGRY WAS STOPPED AND PT TREATED FOR BL CLOT    IBS (irritable bowel syndrome)     Increased pressure in the eye, bilateral     NOT GLAUCOMA AT THIS TIME    Malignant neoplasm  (CMD)     left ovary, s/p hysterectomy / no chemo no radiation /  within 10 yrs ago    Malignant neoplasm  (CMD)     Malignant neoplasm of left kidney, except renal pelvis  (CMD)     Migraines     Osteoarthritis     Osteoporosis     PFO (patent foramen ovale) (CMD) 06/2022    DISCOVERED IN JUNE 2022 WHEN HAD TIA / NEVER KNEW HAD THIS BEFORE    Renal cell carcinoma  (CMD)     Skin cancer 2018    LIPS - TREATED W/ MEDS / NO CHEMO NO RADIATION    Sleep apnea     no CPAP used    TIA (transient ischemic attack) 11/2021    FACIAL DROOP / SPEECH AFFECTED / RESOLVED QUICKLY    Wears glasses          Surgical History  Past Surgical History:   Procedure Laterality Date    Cardiac surgery  06/2022    PATENT FORAMEN OVALE SURGERY DONE THROUGH GROIN NOT OPEN    Cataract extraction, bilateral      WITHIN 10 YRS    Colonoscopy  2019    Eye surgery      Finger surgery Left     LEFT HAND 3RD DIGIT / TRAUMATIC INJURY /  REPAIRED    Hiatal hernia repair Left     Hysterectomy  04/2016    BSO    Hysterectomy  2016    Joint replacement      Kidney surgery Left     Knee arthroplasty Left     Nephrectomy Left     Nephrectomy Left 05/11/2023    Port indwelling implantable      Port indwelling implantable      Remv 2nd cataract,corn-scler sectn  04/2015    Epifanio - cv  04/2022    done immediately before scheduled patent foramen ovale surgery and a BLOOD CLOT WAS DISCOVERED SO THE PFO SURGRY WAS CANCELLED ON THIS DATE    Total knee replacement Left 2020       Family History:  Family History   Problem Relation Age of Onset    Cancer, Colon Mother     Cancer, Endometrial Sister     Cancer, Breast Maternal Grandmother 89    Cancer Mother         colon diagnosed in her 70s    Hypertension Mother     Hypertension Father     Cancer, Stomach Father     Cirrhosis Father     Hypertension Sister     Cancer Sister         endometrial    Pulmonary embolism Sister     Other Sister         scleroderma    Hypertension Sister     Heart disease Sister         pacemaker    Hypertension Daughter     Hypertension Son          Social History  Patient  reports that she has never smoked. She has never used smokeless tobacco. She reports that she does not drink alcohol and does not use drugs.    Prior Living Situation:       Allergies  ALLERGIES:  Patient has no known allergies.    Medications  Current Facility-Administered Medications   Medication Dose Route Frequency Provider Last Rate Last Admin    acetaminophen (TYLENOL) tablet 650 mg  650 mg Oral Q4H PRN Kristen Cote DO   650 mg at 03/18/25 0846    Or    acetaminophen (TYLENOL) suppository 650 mg  650 mg Rectal Q4H PRN Kristen Cote DO        ALPRAZolam (XANAX) tablet 0.25 mg  0.25 mg Oral Q8H PRN Kristen Cote,         amLODIPine (NORVASC) tablet 5 mg  5 mg Oral Daily Kristen Cote DO   5 mg at 03/18/25 0841    aspirin (ECOTRIN) enteric coated tablet 81 mg  81 mg Oral Daily  Kristen Cote, DO   81 mg at 03/18/25 0841    escitalopram (LEXAPRO) tablet 20 mg  20 mg Oral Daily Kristen Cote, DO   20 mg at 03/18/25 0841    heparin (porcine) injection 5,000 Units  5,000 Units Subcutaneous 3 times per day Kristen Cote DO   5,000 Units at 03/18/25 0553    latanoprost (XALATAN) 0.005 % ophthalmic solution 1 drop  1 drop Both Eyes Nightly Kristen Cote DO   1 drop at 03/17/25 2129    levETIRAcetam (KepPRA) tablet 1,000 mg  1,000 mg Oral BID Kristen Cote DO   1,000 mg at 03/18/25 0841    metoPROLOL succinate (TOPROL-XL) ER tablet 50 mg  50 mg Oral Daily Kristen Cote DO   50 mg at 03/18/25 0841    Axitinib (INLYTA) Tab 5 mg POM  5 mg Oral 2 times per day Kristen Cote DO   5 mg at 03/18/25 0846    pantoprazole (PROTONIX) EC tablet 40 mg  40 mg Oral QAM AC Kristen Cote DO   40 mg at 03/18/25 0553    polyethylene glycol (MIRALAX) packet 17 g  17 g Oral Daily PRN Kristen Cote DO        TEMazepam (RESTORIL) capsule 7.5 mg  7.5 mg Oral Nightly PRN Kristen Cote DO        docusate sodium (ENEMEEZ MINI) 283 MG rectal enema 283 mg  283 mg Rectal Daily PRN Magdi Schumacher MD        docusate sodium-sennosides (SENOKOT S) 50-8.6 MG 2 tablet  2 tablet Oral BID PRN Magdi Schumacher MD        bisacodyl (DULCOLAX) suppository 10 mg  10 mg Rectal Daily PRN Magdi Schumacher MD        magnesium hydroxide (MILK OF MAGNESIA) 400 MG/5ML suspension 30 mL  30 mL Oral Daily PRN Magdi Schumacher MD            Review of Systems:   Review of Systems   Constitutional: Weakness, No fever, No chills, No sweats.  Ear/Nose/Mouth/Throat: No nasal congestion.  Respiratory: No shortness of breath, No cough.  Cardiovascular: No chest pain, No syncope.  Gastrointestinal: No nausea, No vomiting  Endocrine: No polyuria, No cold intolerance.  Musculoskeletal: Joint pain (back pain).  Neurologic: +weakness, poor balance, abnormal  gait    Last Recorded Vitals    Vital Last Value 24 Hour Range   Temperature 97.3 °F (36.3 °C) (03/18/25 0626) Temp  Min: 97.3 °F (36.3 °C)  Max: 97.6 °F (36.4 °C)   Pulse 68 (03/18/25 0841) Pulse  Min: 67  Max: 74   Respiratory 17 (03/18/25 0626) Resp  Min: 14  Max: 17   Non-Invasive  Blood Pressure 113/72 (03/18/25 0841) BP  Min: 108/71  Max: 135/83   Pulse Oximetry 94 % (03/18/25 0626) SpO2  Min: 94 %  Max: 99 %     Vital Today Admitted   Weight           INTAKE/OUTPUT:      Intake/Output Summary (Last 24 hours) at 3/18/2025 1206  Last data filed at 3/17/2025 1811  Gross per 24 hour   Intake --   Output 100 ml   Net -100 ml       Bowel: Stool Amount: Smear (03/17/25 1918)  Unmeasured Stool Occurrence: 1 (03/17/25 1918)     PVR: Bladder Scan  Reason for Scan: Post void evaluation (03/18/25 0600)  Bladder Scanned Volume (mL): 77 mL (03/18/25 0600)    Wound Documentation:  Wound Treatment and Goals (most recent)       Wound Treatment and Goals    No documentation.                     PREMORBID FUNCTIONAL STATUS: Independent    Current Functional Status:     Mobility (since 3/16/2025)       None            Self Care/ADL (since 3/16/2025)       pt activity tolerance  1 to 1 activity to rest (P)             Communication/Cognition (since 3/16/2025)       overall status  within functional limits (P)     form of communication  verbal; expressive deficits (P)   dysarthric speech, word finding impairments/difficulties.    attention span  intact (P)     following commands  follows all commands and directions consistently (P)               DIET:   Dietary Orders (From admission, onward)       Start     Ordered    03/17/25 1831  Nutrition Communication  CONTINUOUS        Question:  Nutrition communication (specify)  Answer:  please send dinner tray, pt new admit, thank you    03/17/25 1831 03/17/25 1828  Daily w Lunch; Hormel Mighty Shakes/Shake, Jarrell Oral Nutrition Supplement  As Directed        Question Answer  Comment   Frequency Daily w Lunch    Oral Supplement Hormel Mighty Shakes/Shake, Strawberry        03/17/25 1827 03/17/25 1828  Cardiac; Yes, Medical Nutrition Management by RD (Registered Dietitian) Diet  DIET EFFECTIVE NOW        References:    [facility] Food and Nutrition Services Medical Nutrition Therapy   Question Answer Comment   Diet Modifiers Cardiac    Medical Nutrition Management by RD (Registered Dietitian) Yes, Medical Nutrition Management by RD (Registered Dietitian)        03/17/25 1827                        PHYSICAL EXAMINATION   Physical Exam  GEN: Alert, appears comfortable, in NAD  EYES: EOMI, normal conjunctiva   HENT: Normocephalic, AT  RESP: breathing comfortably, symmetric expansion   CVS: Regular rate, no pitting peripheral edema  GI: Soft, nontender, nondistended,   PSYCH: cooperative, pleasant  EXTR: no LLE lymphedema  SKIN: dry, warm  NEURO:   - Awake, alert; oriented to person, place and time, dysarthric speech  - Motor: Extremities weakness. BUE ~ 3+ to 4-/5. BUE 3-/5 HF,  3+/ to 4-/5 KE/ADF/APF. Impaired hand coordination (R>L)    Labs  Recent Labs   Lab 03/18/25  0618 03/15/25  0650 03/14/25  0700 03/13/25  1743   WBC 5.1 4.6 5.0 6.6   RBC 4.56 3.97* 4.15 4.69   HGB 14.3 12.5 12.9 14.6   HCT 41.9 36.1 37.1 42.4   MCV 91.9 90.9 89.4 90.4   MCH 31.4 31.5 31.1 31.1   MCHC 34.1 34.6 34.8 34.4   RDWCV 12.7 12.7 12.7 12.6    157 176 208   TLYMPH 25 29  --  21       Recent Labs   Lab 03/18/25  0618 03/17/25  0611 03/16/25  0657   SODIUM 138 136 138   CHLORIDE 108 105 109   BUN 26* 20 16   POTASSIUM 4.1 4.3 3.8   GLUCOSE 108* 84 91   CREATININE 1.04* 0.95 1.03*   CALCIUM 9.7 10.1 9.3       Recent Labs   Lab 03/13/25  1740 03/13/25  1733   GLUB 100* 121*       Recent Labs   Lab 03/13/25  1743   INR 0.9       Imaging  No orders to display         Magdi Schumacher MD  3/18/2025 12:06 PM        Time Spent:  Total face to face time spent with patient: 80 minutes.  I spent >50% minutes  in coordination of care of this patient, including reviewing the chart and in speaking with the patient and patients family.

## 2025-03-24 ENCOUNTER — TELEPHONE (OUTPATIENT)
Dept: PSYCHIATRY | Facility: CLINIC | Age: 50
End: 2025-03-24
Payer: MEDICAID

## 2025-03-24 DIAGNOSIS — Z74.09 IMPAIRED MOBILITY: Primary | ICD-10-CM

## 2025-03-24 NOTE — TELEPHONE ENCOUNTER
SW received referral from SHANON Hickey to assist with orders for scooter. After further discussion with provider, it was decided that Pt would be referred for a mobility evaluation incase a PWC would be a better fit as the Pt's insurance does not cover scooters. Will assist with process.

## 2025-03-31 ENCOUNTER — PATIENT MESSAGE (OUTPATIENT)
Dept: NEUROLOGY | Facility: CLINIC | Age: 50
End: 2025-03-31
Payer: MEDICAID

## 2025-03-31 DIAGNOSIS — R53.83 FATIGUE, UNSPECIFIED TYPE: ICD-10-CM

## 2025-03-31 RX ORDER — ARMODAFINIL 50 MG/1
50 TABLET ORAL DAILY
Qty: 30 TABLET | Refills: 0 | Status: SHIPPED | OUTPATIENT
Start: 2025-03-31 | End: 2025-05-02

## 2025-03-31 NOTE — TELEPHONE ENCOUNTER
[facility] near pt have armodafinil in stock. [facility] confirms they have it. Discontinued armodafinil at [facility].

## 2025-04-04 ENCOUNTER — PATIENT MESSAGE (OUTPATIENT)
Dept: PSYCHIATRY | Facility: CLINIC | Age: 50
End: 2025-04-04
Payer: MEDICAID

## 2025-04-04 DIAGNOSIS — Z74.09 IMPAIRED MOBILITY: ICD-10-CM

## 2025-04-04 DIAGNOSIS — G35 MS (MULTIPLE SCLEROSIS): Primary | ICD-10-CM

## 2025-04-04 NOTE — TELEPHONE ENCOUNTER
SIL received referral from SHANON Hickey to assist with scooter order. SIL discussed with provider that Pt's insurance will not cover cost of scooter. AP believes equipment is medically necessary, and more suitable for Pt at this time vs a wheelchair. SIL will explore financial assistance options for cost of scooter once options are provided. SIL communicated this plan to Pt, she was agreeable.  SIL emailed order, notes, and mobility eval referral to [facility].

## 2025-04-11 ENCOUNTER — LAB VISIT (OUTPATIENT)
Dept: LAB | Facility: HOSPITAL | Age: 50
End: 2025-04-11
Attending: CLINICAL NURSE SPECIALIST
Payer: MEDICAID

## 2025-04-11 DIAGNOSIS — R79.89 ELEVATED LFTS: ICD-10-CM

## 2025-04-11 PROCEDURE — 80076 HEPATIC FUNCTION PANEL: CPT

## 2025-04-11 PROCEDURE — 36415 COLL VENOUS BLD VENIPUNCTURE: CPT | Mod: PO

## 2025-04-12 ENCOUNTER — RESULTS FOLLOW-UP (OUTPATIENT)
Dept: NEUROLOGY | Facility: CLINIC | Age: 50
End: 2025-04-12

## 2025-04-12 DIAGNOSIS — M54.41 ACUTE LOW BACK PAIN WITH RIGHT-SIDED SCIATICA, UNSPECIFIED BACK PAIN LATERALITY: Primary | ICD-10-CM

## 2025-04-12 DIAGNOSIS — M48.061 SPINAL STENOSIS OF LUMBAR REGION, UNSPECIFIED WHETHER NEUROGENIC CLAUDICATION PRESENT: ICD-10-CM

## 2025-04-12 LAB
ALBUMIN SERPL BCP-MCNC: 4 G/DL (ref 3.5–5.2)
ALP SERPL-CCNC: 98 UNIT/L (ref 40–150)
ALT SERPL W/O P-5'-P-CCNC: 14 UNIT/L (ref 10–44)
AST SERPL-CCNC: 16 UNIT/L (ref 11–45)
BILIRUB DIRECT SERPL-MCNC: 0.1 MG/DL (ref 0.1–0.3)
BILIRUB SERPL-MCNC: 0.3 MG/DL (ref 0.1–1)
PROT SERPL-MCNC: 7.3 GM/DL (ref 6–8.4)

## 2025-04-14 ENCOUNTER — HOSPITAL ENCOUNTER (OUTPATIENT)
Dept: RADIOLOGY | Facility: HOSPITAL | Age: 50
Discharge: HOME | End: 2025-04-14
Attending: CLINICAL NURSE SPECIALIST
Payer: MEDICAID

## 2025-04-14 DIAGNOSIS — G35 MULTIPLE SCLEROSIS: ICD-10-CM

## 2025-04-14 DIAGNOSIS — M54.16 LUMBAR RADICULOPATHY: ICD-10-CM

## 2025-04-14 PROCEDURE — 72148 MRI LUMBAR SPINE W/O DYE: CPT | Mod: TC

## 2025-04-14 PROCEDURE — 70551 MRI BRAIN STEM W/O DYE: CPT | Mod: 26,,, | Performed by: RADIOLOGY

## 2025-04-14 PROCEDURE — 70551 MRI BRAIN STEM W/O DYE: CPT | Mod: TC

## 2025-04-14 PROCEDURE — 72148 MRI LUMBAR SPINE W/O DYE: CPT | Mod: 26,,, | Performed by: RADIOLOGY

## 2025-04-25 ENCOUNTER — TELEPHONE (OUTPATIENT)
Dept: NEUROLOGY | Facility: CLINIC | Age: 50
End: 2025-04-25
Payer: MEDICAID

## 2025-04-25 NOTE — TELEPHONE ENCOUNTER
LVM for pt to contact our office in regards to giving pt phone number to medicaid referral center.

## 2025-05-13 ENCOUNTER — PATIENT MESSAGE (OUTPATIENT)
Dept: PSYCHIATRY | Facility: CLINIC | Age: 50
End: 2025-05-13
Payer: MEDICAID

## 2025-06-05 ENCOUNTER — PATIENT MESSAGE (OUTPATIENT)
Dept: NEUROLOGY | Facility: CLINIC | Age: 50
End: 2025-06-05
Payer: MEDICAID

## 2025-06-05 DIAGNOSIS — G35 MULTIPLE SCLEROSIS: ICD-10-CM

## 2025-06-05 DIAGNOSIS — R53.83 FATIGUE, UNSPECIFIED TYPE: ICD-10-CM

## 2025-06-06 RX ORDER — ARMODAFINIL 50 MG/1
50 TABLET ORAL DAILY
Qty: 90 TABLET | Refills: 1 | Status: SHIPPED | OUTPATIENT
Start: 2025-06-06 | End: 2025-12-03

## 2025-06-19 ENCOUNTER — PATIENT MESSAGE (OUTPATIENT)
Dept: PSYCHIATRY | Facility: CLINIC | Age: 50
End: 2025-06-19
Payer: MEDICAID

## 2025-06-20 ENCOUNTER — PATIENT MESSAGE (OUTPATIENT)
Dept: NEUROLOGY | Facility: CLINIC | Age: 50
End: 2025-06-20
Payer: MEDICAID

## 2025-07-24 DIAGNOSIS — M62.838 MUSCLE SPASM: ICD-10-CM

## 2025-07-24 DIAGNOSIS — G35 MULTIPLE SCLEROSIS: ICD-10-CM

## 2025-07-24 DIAGNOSIS — G47.00 INSOMNIA, UNSPECIFIED TYPE: ICD-10-CM

## 2025-07-24 DIAGNOSIS — N31.9 NEUROGENIC BLADDER: ICD-10-CM

## 2025-07-25 RX ORDER — BACLOFEN 20 MG/1
20 TABLET ORAL NIGHTLY
Qty: 90 TABLET | Refills: 3 | Status: SHIPPED | OUTPATIENT
Start: 2025-07-25

## 2025-07-25 RX ORDER — TOLTERODINE 2 MG/1
4 CAPSULE, EXTENDED RELEASE ORAL DAILY
Qty: 180 CAPSULE | Refills: 3 | Status: SHIPPED | OUTPATIENT
Start: 2025-07-25

## 2025-07-25 RX ORDER — TRAZODONE HYDROCHLORIDE 50 MG/1
50 TABLET ORAL NIGHTLY
Qty: 90 TABLET | Refills: 3 | Status: SHIPPED | OUTPATIENT
Start: 2025-07-25

## 2025-07-30 ENCOUNTER — PATIENT MESSAGE (OUTPATIENT)
Dept: PSYCHIATRY | Facility: CLINIC | Age: 50
End: 2025-07-30
Payer: MEDICAID

## 2025-08-01 ENCOUNTER — PATIENT MESSAGE (OUTPATIENT)
Dept: PSYCHIATRY | Facility: CLINIC | Age: 50
End: 2025-08-01
Payer: MEDICAID

## 2025-08-15 ENCOUNTER — TELEPHONE (OUTPATIENT)
Dept: NEUROLOGY | Facility: CLINIC | Age: 50
End: 2025-08-15
Payer: MEDICARE

## 2025-08-19 ENCOUNTER — TELEPHONE (OUTPATIENT)
Dept: NEUROLOGY | Facility: CLINIC | Age: 50
End: 2025-08-19
Payer: MEDICARE

## 2025-08-21 ENCOUNTER — OFFICE VISIT (OUTPATIENT)
Dept: NEUROLOGY | Facility: CLINIC | Age: 50
End: 2025-08-21
Payer: MEDICARE

## 2025-08-21 ENCOUNTER — TELEPHONE (OUTPATIENT)
Dept: NEUROLOGY | Facility: CLINIC | Age: 50
End: 2025-08-21

## 2025-08-21 DIAGNOSIS — Z78.9 IMPAIRED MOBILITY AND ADLS: ICD-10-CM

## 2025-08-21 DIAGNOSIS — Z71.89 COUNSELING REGARDING GOALS OF CARE: ICD-10-CM

## 2025-08-21 DIAGNOSIS — Z79.899 HIGH RISK MEDICATION USE: ICD-10-CM

## 2025-08-21 DIAGNOSIS — Z74.09 IMPAIRED MOBILITY AND ADLS: ICD-10-CM

## 2025-08-21 DIAGNOSIS — F32.A DEPRESSION, UNSPECIFIED DEPRESSION TYPE: ICD-10-CM

## 2025-08-21 DIAGNOSIS — R53.83 FATIGUE, UNSPECIFIED TYPE: ICD-10-CM

## 2025-08-21 DIAGNOSIS — G35 MULTIPLE SCLEROSIS: Primary | ICD-10-CM

## 2025-08-21 DIAGNOSIS — Z29.89 PROPHYLACTIC IMMUNOTHERAPY: ICD-10-CM

## 2025-08-21 PROCEDURE — G2211 COMPLEX E/M VISIT ADD ON: HCPCS | Mod: 95,,, | Performed by: CLINICAL NURSE SPECIALIST

## 2025-08-21 PROCEDURE — 1159F MED LIST DOCD IN RCRD: CPT | Mod: CPTII,95,, | Performed by: CLINICAL NURSE SPECIALIST

## 2025-08-21 PROCEDURE — 98007 SYNCH AUDIO-VIDEO EST HI 40: CPT | Mod: 95,,, | Performed by: CLINICAL NURSE SPECIALIST

## 2025-08-21 RX ORDER — ARMODAFINIL 50 MG/1
100 TABLET ORAL DAILY
Qty: 180 TABLET | Refills: 1 | Status: SHIPPED | OUTPATIENT
Start: 2025-08-21 | End: 2026-02-17

## 2025-08-22 ENCOUNTER — TELEPHONE (OUTPATIENT)
Dept: NEUROLOGY | Facility: CLINIC | Age: 50
End: 2025-08-22
Payer: MEDICARE

## 2025-08-25 ENCOUNTER — LAB VISIT (OUTPATIENT)
Dept: LAB | Facility: HOSPITAL | Age: 50
End: 2025-08-25
Attending: CLINICAL NURSE SPECIALIST
Payer: MEDICARE

## 2025-08-25 DIAGNOSIS — G35 MULTIPLE SCLEROSIS: ICD-10-CM

## 2025-08-25 LAB
25(OH)D3+25(OH)D2 SERPL-MCNC: 49 NG/ML (ref 30–96)
ABSOLUTE EOSINOPHIL (OHS): 0.15 K/UL
ABSOLUTE MONOCYTE (OHS): 0.78 K/UL (ref 0.3–1)
ABSOLUTE NEUTROPHIL COUNT (OHS): 9.6 K/UL (ref 1.8–7.7)
ALBUMIN SERPL BCP-MCNC: 4.1 G/DL (ref 3.5–5.2)
ALP SERPL-CCNC: 91 UNIT/L (ref 40–150)
ALT SERPL W/O P-5'-P-CCNC: 15 UNIT/L (ref 0–55)
ANION GAP (OHS): 15 MMOL/L (ref 8–16)
AST SERPL-CCNC: 20 UNIT/L (ref 0–50)
BASOPHILS # BLD AUTO: 0.1 K/UL
BASOPHILS NFR BLD AUTO: 0.7 %
BILIRUB SERPL-MCNC: 0.3 MG/DL (ref 0.1–1)
BUN SERPL-MCNC: 9 MG/DL (ref 6–20)
CALCIUM SERPL-MCNC: 9.1 MG/DL (ref 8.7–10.5)
CHLORIDE SERPL-SCNC: 108 MMOL/L (ref 95–110)
CO2 SERPL-SCNC: 22 MMOL/L (ref 23–29)
CREAT SERPL-MCNC: 0.6 MG/DL (ref 0.5–1.4)
ERYTHROCYTE [DISTWIDTH] IN BLOOD BY AUTOMATED COUNT: 17.2 % (ref 11.5–14.5)
GFR SERPLBLD CREATININE-BSD FMLA CKD-EPI: >60 ML/MIN/1.73/M2
GLUCOSE SERPL-MCNC: 90 MG/DL (ref 70–110)
HBV CORE AB SERPL QL IA: NORMAL
HBV SURFACE AG SERPL QL IA: NORMAL
HCT VFR BLD AUTO: 36 % (ref 37–48.5)
HGB BLD-MCNC: 11.4 GM/DL (ref 12–16)
IGA SERPL-MCNC: 98 MG/DL (ref 40–350)
IGG SERPL-MCNC: 988 MG/DL (ref 650–1600)
IGM SERPL-MCNC: 51 MG/DL (ref 50–300)
IMM GRANULOCYTES # BLD AUTO: 0.06 K/UL (ref 0–0.04)
IMM GRANULOCYTES NFR BLD AUTO: 0.4 % (ref 0–0.5)
LYMPHOCYTES # BLD AUTO: 3.51 K/UL (ref 1–4.8)
MCH RBC QN AUTO: 22 PG (ref 27–31)
MCHC RBC AUTO-ENTMCNC: 31.7 G/DL (ref 32–36)
MCV RBC AUTO: 69 FL (ref 82–98)
NUCLEATED RBC (/100WBC) (OHS): 0 /100 WBC
PLATELET # BLD AUTO: 424 K/UL (ref 150–450)
PMV BLD AUTO: 10.3 FL (ref 9.2–12.9)
POTASSIUM SERPL-SCNC: 3.5 MMOL/L (ref 3.5–5.1)
PROT SERPL-MCNC: 6.9 GM/DL (ref 6–8.4)
RBC # BLD AUTO: 5.19 M/UL (ref 4–5.4)
RELATIVE EOSINOPHIL (OHS): 1.1 %
RELATIVE LYMPHOCYTE (OHS): 24.7 % (ref 18–48)
RELATIVE MONOCYTE (OHS): 5.5 % (ref 4–15)
RELATIVE NEUTROPHIL (OHS): 67.6 % (ref 38–73)
SODIUM SERPL-SCNC: 145 MMOL/L (ref 136–145)
WBC # BLD AUTO: 14.2 K/UL (ref 3.9–12.7)

## 2025-08-25 PROCEDURE — 86704 HEP B CORE ANTIBODY TOTAL: CPT

## 2025-08-25 PROCEDURE — 85025 COMPLETE CBC W/AUTO DIFF WBC: CPT

## 2025-08-25 PROCEDURE — 80053 COMPREHEN METABOLIC PANEL: CPT

## 2025-08-25 PROCEDURE — 36415 COLL VENOUS BLD VENIPUNCTURE: CPT | Mod: PO

## 2025-08-25 PROCEDURE — 82306 VITAMIN D 25 HYDROXY: CPT

## 2025-08-25 PROCEDURE — 87340 HEPATITIS B SURFACE AG IA: CPT

## 2025-08-25 PROCEDURE — 82784 ASSAY IGA/IGD/IGG/IGM EACH: CPT | Mod: 59

## 2025-08-29 ENCOUNTER — TELEPHONE (OUTPATIENT)
Dept: NEUROLOGY | Facility: CLINIC | Age: 50
End: 2025-08-29
Payer: MEDICARE

## 2025-08-29 DIAGNOSIS — R26.9 GAIT DISTURBANCE: ICD-10-CM

## 2025-08-29 DIAGNOSIS — G35 MULTIPLE SCLEROSIS: ICD-10-CM

## 2025-08-29 RX ORDER — DALFAMPRIDINE 10 MG/1
10 TABLET, FILM COATED, EXTENDED RELEASE ORAL EVERY 12 HOURS
Qty: 60 TABLET | Refills: 5 | Status: SHIPPED | OUTPATIENT
Start: 2025-08-29 | End: 2026-02-25